# Patient Record
Sex: MALE | Race: WHITE | NOT HISPANIC OR LATINO | Employment: FULL TIME | URBAN - METROPOLITAN AREA
[De-identification: names, ages, dates, MRNs, and addresses within clinical notes are randomized per-mention and may not be internally consistent; named-entity substitution may affect disease eponyms.]

---

## 2017-05-15 ENCOUNTER — APPOINTMENT (OUTPATIENT)
Dept: GENERAL RADIOLOGY | Facility: HOSPITAL | Age: 52
End: 2017-05-15

## 2017-05-15 PROCEDURE — 71020 HC CHEST PA AND LATERAL: CPT | Performed by: FAMILY MEDICINE

## 2017-05-15 PROCEDURE — 71020 XR CHEST 2 VW: CPT | Performed by: FAMILY MEDICINE

## 2019-09-12 ENCOUNTER — HOSPITAL ENCOUNTER (EMERGENCY)
Facility: HOSPITAL | Age: 54
Discharge: HOME | End: 2019-09-12
Attending: EMERGENCY MEDICINE | Admitting: EMERGENCY MEDICINE

## 2019-09-12 ENCOUNTER — APPOINTMENT (OUTPATIENT)
Dept: CT IMAGING | Facility: HOSPITAL | Age: 54
End: 2019-09-12

## 2019-09-12 VITALS
OXYGEN SATURATION: 95 % | SYSTOLIC BLOOD PRESSURE: 114 MMHG | HEART RATE: 84 BPM | HEIGHT: 69 IN | RESPIRATION RATE: 14 BRPM | WEIGHT: 240 LBS | DIASTOLIC BLOOD PRESSURE: 66 MMHG | BODY MASS INDEX: 35.55 KG/M2 | TEMPERATURE: 97.9 F

## 2019-09-12 DIAGNOSIS — M54.16 LUMBAR RADICULOPATHY: ICD-10-CM

## 2019-09-12 DIAGNOSIS — M51.26 LUMBAR HERNIATED DISC: Primary | ICD-10-CM

## 2019-09-12 PROCEDURE — 99283 EMERGENCY DEPT VISIT LOW MDM: CPT

## 2019-09-12 PROCEDURE — 72131 CT LUMBAR SPINE W/O DYE: CPT

## 2019-09-12 PROCEDURE — 96372 THER/PROPH/DIAG INJ SC/IM: CPT

## 2019-09-12 PROCEDURE — 25010000002 HYDROMORPHONE 1 MG/ML SOLUTION: Performed by: EMERGENCY MEDICINE

## 2019-09-12 PROCEDURE — 25010000002 PROMETHAZINE PER 50 MG: Performed by: EMERGENCY MEDICINE

## 2019-09-12 RX ORDER — OXYCODONE AND ACETAMINOPHEN 7.5; 325 MG/1; MG/1
1-2 TABLET ORAL EVERY 6 HOURS PRN
Qty: 20 TABLET | Refills: 0 | Status: SHIPPED | OUTPATIENT
Start: 2019-09-12 | End: 2019-10-03

## 2019-09-12 RX ORDER — DICLOFENAC SODIUM AND MISOPROSTOL 75; 200 MG/1; UG/1
1 TABLET, DELAYED RELEASE ORAL 2 TIMES DAILY
COMMUNITY
End: 2019-09-20

## 2019-09-12 RX ORDER — PROMETHAZINE HYDROCHLORIDE 25 MG/ML
25 INJECTION, SOLUTION INTRAMUSCULAR; INTRAVENOUS ONCE
Status: COMPLETED | OUTPATIENT
Start: 2019-09-12 | End: 2019-09-12

## 2019-09-12 RX ORDER — METHYLPREDNISOLONE 4 MG/1
4 TABLET ORAL DAILY
COMMUNITY
End: 2019-09-18

## 2019-09-12 RX ORDER — CYCLOBENZAPRINE HCL 10 MG
10 TABLET ORAL 3 TIMES DAILY PRN
COMMUNITY
End: 2019-09-18

## 2019-09-12 RX ADMIN — PROMETHAZINE HYDROCHLORIDE 25 MG: 25 INJECTION INTRAMUSCULAR; INTRAVENOUS at 12:41

## 2019-09-12 RX ADMIN — HYDROMORPHONE HYDROCHLORIDE 1 MG: 1 INJECTION, SOLUTION INTRAMUSCULAR; INTRAVENOUS; SUBCUTANEOUS at 12:43

## 2019-09-12 NOTE — ED PROVIDER NOTES
EMERGENCY DEPARTMENT ENCOUNTER    Room Number:  06/06  Date of encounter:  9/12/2019  PCP: Provider, No Known  Historian: Pt      HPI:  Chief Complaint: Lower back pain  A complete HPI/ROS/PMH/PSH/SH/FH are unobtainable due to: n/a    Context: Renaldo Rodriguez is a 54 y.o. male who presents to the ED c/o lower back pain that has been ongoing since a work injury on 8/10/19. His pain significantly worsened 5 days ago after play golf. The pain is constant and radiates down his RLE. He also c/o tingling in his RLE. Pt has seen at a workers[facility] clinic 3 days ago and was prescribed a Medrol dose pack and Flexeril, which he has taken w/o relief. Pt denies fever, CP, SOA, weakness, and bowel or bladder incontinence       Duration:  Worse in past 5 days  Timing: constant  Intensity/Severity: severe  Progression: worsening   Aggravating Factors: movement   Alleviating Factors: none       PAST MEDICAL HISTORY  Active Ambulatory Problems     Diagnosis Date Noted   • No Active Ambulatory Problems     Resolved Ambulatory Problems     Diagnosis Date Noted   • No Resolved Ambulatory Problems     Past Medical History:   Diagnosis Date   • Injury of back    • Seasonal allergies          PAST SURGICAL HISTORY  History reviewed. No pertinent surgical history.      FAMILY HISTORY  History reviewed. No pertinent family history.      SOCIAL HISTORY  Social History     Socioeconomic History   • Marital status:      Spouse name: Not on file   • Number of children: Not on file   • Years of education: Not on file   • Highest education level: Not on file   Tobacco Use   • Smoking status: Never Smoker   Substance and Sexual Activity   • Alcohol use: Yes     Comment: social   • Drug use: No   • Sexual activity: Defer         ALLERGIES  Patient has no known allergies.        REVIEW OF SYSTEMS  Review of Systems   Constitutional: Negative for fever.   Respiratory: Negative for shortness of breath.    Cardiovascular: Negative for chest pain.    Musculoskeletal: Positive for back pain (RLE).   Neurological: Positive for numbness (tingling in RLE). Negative for weakness.        Denies incontinence       All systems reviewed and negative except for those discussed in HPI.       PHYSICAL EXAM    I have reviewed the triage vital signs and nursing notes.    ED Triage Vitals [09/12/19 1205]   Temp Heart Rate Resp BP SpO2   97.9 °F (36.6 °C) 84 18 (!) 135/113 97 %      Temp src Heart Rate Source Patient Position BP Location FiO2 (%)   Tympanic Monitor Sitting Right arm --       GENERAL: not distressed  HENT: nares patent  EYES: no scleral icterus  CV: regular rhythm, regular rate  RESPIRATORY: normal effort, CTAB  ABDOMEN: soft, non tender  MUSCULOSKELETAL: no deformity, right sided lumbar pain with palpation   NEURO: alert, moves all extremities, follows commands, positive SLR and positive cross SLR  SKIN: warm, dry        LAB RESULTS  No results found for this or any previous visit (from the past 24 hour(s)).    Ordered the above labs and independently reviewed the results.        RADIOLOGY  Ct Lumbar Spine Without Contrast    Result Date: 9/12/2019  CT LUMBAR SPINE WITHOUT CONTRAST  HISTORY: Back pain, right radiculopathy.  COMPARISON: None.  FINDINGS: The alignment of the lumbar spine is within normal limits. There is mild-to-moderate loss of disc height at L4-L5.  L1-L2: There is a mild broad-based disc bulge with no evidence of herniation.  L2-L3: There is a mild central disc osteophyte complex resulting in mild flattening of the ventral surface of the thecal sac. There is mild neural foraminal compromise bilaterally secondary to extension of disc osteophyte complex into the neural foramen.  L3-L4: There is a mild central disc osteophyte complex resulting in mild flattening of the ventral surface of the thecal sac. There is mild neural foraminal compromise bilaterally secondary to extension of a small disc osteophyte complex into the neural foramen.   L4-L5: Mild facet degenerative disease is present bilaterally, slightly more prominent on the right. There is severe neural foraminal compromise on the right secondary to a far lateral disc protrusion or broad-based herniation. Mild-to-moderate neural foraminal compromise is present on the left secondary to loss of disc height and extension of a smaller disc osteophyte complex into the neural foramen.  L5-S1: Mild facet degenerative disease is present bilaterally. There is a mild broad-based disc osteophyte complex with no evidence of herniation.      Multilevel degenerative disease as described in detail above, most prominent of which however is at L4-L5 where there is a far lateral disc protrusion or broad-based herniation extending into the neural foramen and resulting in severe neural foraminal compromise. See above. Further evaluation could be performed with a MRI examination of the lumbar spine.  The above information was called to and discussed with Dr. Tejada.    Radiation dose reduction techniques were utilized, including automated exposure control and exposure modulation based on body size.         I ordered the above noted radiological studies. Reviewed by me and discussed with radiologist.  See dictation for official radiology interpretation.      PROCEDURES    Procedures      MEDICATIONS GIVEN IN ER    Medications   HYDROmorphone (DILAUDID) injection 1 mg (1 mg Intramuscular Given 9/12/19 1243)   promethazine (PHENERGAN) injection 25 mg (25 mg Intramuscular Given 9/12/19 1241)         PROGRESS, DATA ANALYSIS, CONSULTS, AND MEDICAL DECISION MAKING  12:28 PM  Phenergan and Dilaudid ordered for pain.     1:38 PM  CT lumbar spine ordered.     2:21 PM  Rechecked pt who is resting in NAD. His pain has improved. Informed pt of CT results. Consult placed to neurosurgery.     All radiology studies have been reviewed by me and discussed with radiologist dictating the report.  Discussion below represents my  analysis of pertinent findings related to patient's condition, differential diagnosis, treatment plan and final disposition.      ED Course as of Sep 12 1601   Thu Sep 12, 2019   1227 54-year-old male with reported work-related injury about 1 month ago.  He has been followed with a Worker's Comp. agency.  He exacerbated his pain while playing golf on Saturday and now has fairly severe pain that radiates down the right leg.  Patient was started on Monday with Medrol Dosepak, diclofenac and Flexeril.  These have not helped the pain and he continues complain of severe pain in the right lower back rating down the right lower leg  On examination he has a positive cross straight leg raise and straight leg raise.  Strength and sensation is normal and there is no saddle paresthesias.  We will give IM Dilaudid and Phenergan for pain.  Will get CT scan of the lumbar spine as I have a high suspicion for lumbar sciatica.  [DB]   1419 I discussed lumbar spine CT results with Dr. Dye.  There is lumbar DJD most prominently at the L4/5 disc level with some disc protrusion causing severe neuro-foraminal narrowing at the L4 nerve root.  [DB]   1420 I will discuss results of the CT scan of the bedside with patient and wife.  Will consult neurosurgery for likely outpatient treatment.  [DB]   1600 I discussed evaluation with Araceli Robertson from neurosurgery service.  She felt the patient could follow-up as an outpatient we will do our best to get patient seen with the next couple weeks.  [DB]      ED Course User Index  [DB] Noe Tejada MD       AS OF 4:01 PM VITALS:    BP - 133/79  HR - 84  TEMP - 97.9 °F (36.6 °C) (Tympanic)  02 SATS - 98%        DIAGNOSIS  Final diagnoses:   Lumbar herniated disc   Lumbar radiculopathy         DISPOSITION  DISCHARGE    Patient discharged in stable condition.    Reviewed implications of results, diagnosis, meds, responsibility to follow up, warning signs and symptoms of possible worsening, potential  complications and reasons to return to ER.    Patient/Family voiced understanding of above instructions.    Discussed plan for discharge, as there is no emergent indication for admission. Patient referred to primary care provider for BP management due to today's BP. Pt/family is agreeable and understands need for follow up and repeat testing.  Pt is aware that discharge does not mean that nothing is wrong but it indicates no emergency is present that requires admission and they must continue care with follow-up as given below or physician of their choice.     FOLLOW-UP  Ghanshyam Canas MD  [address] [address]  [phone]      Call for Appointment         Medication List      New Prescriptions    oxyCODONE-acetaminophen 7.5-325 MG per tablet  Commonly known as:  PERCOCET  Take 1-2 tablets by mouth Every 6 (Six) Hours As Needed (Pain).              Documentation assistance provided by martin Barrera for Dr. Tejada.  Information recorded by the scribe was done at my direction and has been verified and validated by me.       Mey Barrera  09/12/19 0516       Noe Tejada MD  09/12/19 2069

## 2019-09-18 ENCOUNTER — OFFICE VISIT (OUTPATIENT)
Dept: NEUROSURGERY | Facility: CLINIC | Age: 54
End: 2019-09-18

## 2019-09-18 VITALS
SYSTOLIC BLOOD PRESSURE: 134 MMHG | DIASTOLIC BLOOD PRESSURE: 81 MMHG | BODY MASS INDEX: 34.66 KG/M2 | WEIGHT: 234 LBS | HEIGHT: 69 IN | HEART RATE: 80 BPM

## 2019-09-18 DIAGNOSIS — M54.16 LUMBAR RADICULOPATHY: Primary | ICD-10-CM

## 2019-09-18 PROCEDURE — 99244 OFF/OP CNSLTJ NEW/EST MOD 40: CPT | Performed by: NEUROLOGICAL SURGERY

## 2019-09-18 RX ORDER — DEXAMETHASONE 4 MG/1
8 TABLET ORAL TAKE AS DIRECTED
Qty: 2 TABLET | Refills: 0 | Status: SHIPPED | OUTPATIENT
Start: 2019-09-18 | End: 2019-09-24 | Stop reason: HOSPADM

## 2019-09-18 NOTE — H&P (VIEW-ONLY)
Subjective   Patient ID: Renaldo Rodriguez is a 54 y.o. male is being seen for consultation today at the request of Noe Tejada MD for back pain that radiates into his right leg with numbness and tingling. He just finished MDP, Flexeril and Arthrotec. He completed a couple sessions of PT and it made his pain worse. He is doing HEP. He had a muscle injury 8/2018 and was not seen or treated. He had another injury lifting boxes at work 8/10/2019. His employer did not have him treated anywhere after that incident. He was then golfing when severe pain started again and he was seen in the ER.    History of Present Illness     This patient began having pain in his back with radiation into his right hip about a year ago.  At that time he had a lifting injury at work in August 2018.  He said he did not reported at that time but ultimately the pain got quite a bit better.  Subsequent to that he did generally okay until August of this year when he had the same injury at work.  He began having pain in his back with radiation into his right hip at that time and then was playing golf about 10 days ago when he had the acute onset of severe pain in his right lower leg.  Currently his back is achy but does not cause severe pain.  His left leg is okay.  He has no difficulty with bowel or bladder control or other associated symptoms.  He has been treated with steroids as well as a short course of physical therapy which did not help.    The following portions of the patient's history were reviewed and updated as appropriate: allergies, current medications, past family history, past medical history, past social history, past surgical history and problem list.    Review of Systems   Constitutional: Positive for activity change.   Respiratory: Negative for chest tightness and shortness of breath.    Cardiovascular: Negative for chest pain.   Musculoskeletal: Positive for back pain, gait problem and myalgias.   Neurological: Positive for  weakness (Right leg) and numbness.        Positive for tingling   All other systems reviewed and are negative.      Objective   Physical Exam   Constitutional: He is oriented to person, place, and time. He appears well-developed and well-nourished.   HENT:   Head: Normocephalic and atraumatic.   Eyes: Conjunctivae and EOM are normal. Pupils are equal, round, and reactive to light.   Fundoscopic exam:       The right eye shows no papilledema. The right eye shows venous pulsations.        The left eye shows no papilledema. The left eye shows venous pulsations.   Neck: Carotid bruit is not present.   Neurological: He is oriented to person, place, and time. He has a normal Finger-Nose-Finger Test and a normal Heel to Shin Test. Gait normal.   Reflex Scores:       Tricep reflexes are 2+ on the right side and 2+ on the left side.       Bicep reflexes are 2+ on the right side and 2+ on the left side.       Brachioradialis reflexes are 2+ on the right side and 2+ on the left side.       Patellar reflexes are 2+ on the right side and 2+ on the left side.       Achilles reflexes are 2+ on the right side and 2+ on the left side.  Psychiatric: His speech is normal.     Neurologic Exam     Mental Status   Oriented to person, place, and time.   Registration of memory: Good recent and remote memory.   Attention: normal. Concentration: normal.   Speech: speech is normal   Level of consciousness: alert  Knowledge: consistent with education.     Cranial Nerves     CN II   Visual fields full to confrontation.   Visual acuity: normal    CN III, IV, VI   Pupils are equal, round, and reactive to light.  Extraocular motions are normal.     CN V   Facial sensation intact.   Right corneal reflex: normal  Left corneal reflex: normal    CN VII   Facial expression full, symmetric.   Right facial weakness: none  Left facial weakness: none    CN VIII   Hearing: intact    CN IX, X   Palate: symmetric    CN XI   Right sternocleidomastoid strength:  normal  Left sternocleidomastoid strength: normal    CN XII   Tongue: not atrophic  Tongue deviation: none    Motor Exam   Muscle bulk: normal  Right arm tone: normal  Left arm tone: normal  Right leg tone: normal  Left leg tone: normal    Strength   Strength 5/5 except as noted.     Sensory Exam   Light touch normal.     Gait, Coordination, and Reflexes     Gait  Gait: normal    Coordination   Finger to nose coordination: normal  Heel to shin coordination: normal    Reflexes   Right brachioradialis: 2+  Left brachioradialis: 2+  Right biceps: 2+  Left biceps: 2+  Right triceps: 2+  Left triceps: 2+  Right patellar: 2+  Left patellar: 2+  Right achilles: 2+  Left achilles: 2+  Right : 2+  Left : 2+      Assessment/Plan   Independent Review of Radiographic Studies:      I reviewed his CT done on 12 September of this year.  This does raise the question of a far lateral disc herniation at L4-5.  This appears to be worse on the right than the left.  There is some disc bulging at the other levels.  The clarity on the CT is not good.    Medical Decision Making:      I told the patient and his wife that I really think we need to get better images at this point.  To that end I suggested a lumbar myelogram.  I told the patient what a myelogram involves.  I explained that there is a 50% chance of developing a bad headache and nausea as a result of the test.  I explained that there is also a very small chance of infection, seizures, and bleeding.  I explained how we would treat a post myelogram headache including bedrest, caffeinated fluids, steroids, and blood patch.  The patient does ask to proceed.    Renaldo was seen today for back pain and leg pain.    Diagnoses and all orders for this visit:    Lumbar radiculopathy  -     Obtain Informed Consent; Standing  -     IR Myelogram Lumbar Spine; Future  -     CT Lumbar Spine With Intrathecal Contrast; Future  -     XR Spine Lumbar Complete With Flex & Ext; Future  -      No Lab Testing Needed; Standing  -     dexamethasone (DECADRON) 4 MG tablet; Take 2 tablets by mouth Take As Directed. Take both tablets by mouth 2 hours before myelogram      Return for After radiology test.

## 2019-09-24 ENCOUNTER — HOSPITAL ENCOUNTER (OUTPATIENT)
Dept: GENERAL RADIOLOGY | Facility: HOSPITAL | Age: 54
Discharge: HOME | End: 2019-09-24

## 2019-09-24 ENCOUNTER — HOSPITAL ENCOUNTER (OUTPATIENT)
Dept: CT IMAGING | Facility: HOSPITAL | Age: 54
Discharge: HOME | End: 2019-09-24
Admitting: NEUROLOGICAL SURGERY

## 2019-09-24 VITALS
WEIGHT: 229 LBS | SYSTOLIC BLOOD PRESSURE: 122 MMHG | OXYGEN SATURATION: 98 % | HEART RATE: 62 BPM | RESPIRATION RATE: 16 BRPM | BODY MASS INDEX: 33.92 KG/M2 | HEIGHT: 69 IN | DIASTOLIC BLOOD PRESSURE: 78 MMHG

## 2019-09-24 DIAGNOSIS — M54.16 LUMBAR RADICULOPATHY: ICD-10-CM

## 2019-09-24 PROCEDURE — 25010000003 LIDOCAINE 1 % SOLUTION: Performed by: NEUROLOGICAL SURGERY

## 2019-09-24 PROCEDURE — 72114 X-RAY EXAM L-S SPINE BENDING: CPT

## 2019-09-24 PROCEDURE — 62304 MYELOGRAPHY LUMBAR INJECTION: CPT

## 2019-09-24 PROCEDURE — 72132 CT LUMBAR SPINE W/DYE: CPT

## 2019-09-24 PROCEDURE — 0 IOPAMIDOL 41 % SOLUTION: Performed by: NEUROLOGICAL SURGERY

## 2019-09-24 PROCEDURE — 72240 MYELOGRAPHY NECK SPINE: CPT

## 2019-09-24 RX ORDER — ACETAMINOPHEN 325 MG/1
650 TABLET ORAL EVERY 4 HOURS PRN
Status: DISCONTINUED | OUTPATIENT
Start: 2019-09-24 | End: 2019-09-25 | Stop reason: HOSPADM

## 2019-09-24 RX ORDER — ACETAMINOPHEN 500 MG
1000 TABLET ORAL EVERY 6 HOURS PRN
COMMUNITY

## 2019-09-24 RX ORDER — HYDROCODONE BITARTRATE AND ACETAMINOPHEN 5; 325 MG/1; MG/1
1 TABLET ORAL EVERY 4 HOURS PRN
Status: DISCONTINUED | OUTPATIENT
Start: 2019-09-24 | End: 2019-09-25 | Stop reason: HOSPADM

## 2019-09-24 RX ORDER — LIDOCAINE HYDROCHLORIDE 10 MG/ML
10 INJECTION, SOLUTION INFILTRATION; PERINEURAL ONCE
Status: COMPLETED | OUTPATIENT
Start: 2019-09-24 | End: 2019-09-24

## 2019-09-24 RX ADMIN — IOPAMIDOL 20 ML: 408 INJECTION, SOLUTION INTRATHECAL at 07:26

## 2019-09-24 RX ADMIN — HYDROCODONE BITARTATE AND ACETAMINOPHEN 1 TABLET: 5; 325 TABLET ORAL at 08:02

## 2019-09-24 RX ADMIN — LIDOCAINE HYDROCHLORIDE 7 ML: 10 INJECTION, SOLUTION INFILTRATION; PERINEURAL at 07:25

## 2019-09-24 NOTE — NURSING NOTE
Patient dressed, complained of slight dizziness.  Instructed to be less active for the next 2-3 days. He is consuming lots of hydrating beverages and caffeine while he is here. To wheelchair now, taken to exit with staff member.  Wife is driving him home now.

## 2019-09-24 NOTE — DISCHARGE INSTRUCTIONS
EDUCATION /DISCHARGE INSTRUCTIONS:    A myelogram is a special radiology procedure of the spinal cord, spinal nerves and other related structures.  You will be awake during the examination.  An area of your lower back will be cleansed with an antiseptic solution.  The physician will inject a numbing medication in your lower back.  While your back is numb, a needle will be placed in the lower back area.  A small amount of spinal fluid may be withdrawn and sent to the lab if ordered by your physician. While the needle is in the back, an injection of a contrast material (xray dye) will be given through the needle.  The contrast material will allow the physician to see the spinal cord and spinal nerves.  Once injected, the needle will be removed and a band aid will be placed over the injection site.  The table will be tilted during the process to allow the contrast material to flow to particular areas in the spine.  Following the injection and xrays, you will be taken to the CT scan where more pictures will be taken. After the procedure is finished, the contrast material will be absorbed by your body and eliminated through your kidneys.  The radiologist will study and interpret your myelogram and send the results to your physician.  Procedure risks of a myelogram include, but are not limited to:  *  Bleeding   *  seizure  *  Infection   *  Headache, possibly severe requiring  *  Contrast reaction      a blood patch  *  Nerve or cord injury  *  Paralysis and death  Benefits of the procedure:  *  Best examination for delineating pathology related to spinal cord compression from a    disc and/or nerve root compression  Alternatives to the procedure:  MRI - a non invasive procedure requiring intravenous contrast injection.  Cannot be done on patients with certain pacemakers or metal in the body.  MRI risks include possible reaction to the contrast material, movement of metal located in the body.  Benefit to MRI:   Non-invasive and usually painless procedure.  THIS EDUCATION INFORMATION WAS REVIEWED PRIOR TO PROCEDURE AND CONSENT. Patient initials________________Time____0702______________    24 hour rest period ends ____1100________________.  Important information following your myelogram:  * ACTIVITY:   *  Lie down with your head elevated no more than 2 pillows high today & tonight  *  Sit up to eat your meals and use the restroom, otherwise, lie down.  *  Remain less active for two to three days.  *  Do not drive for 48 hours following a myelogram  *  You may remove the bandage and shower in the morning  *  Increase your fluids for the next 24 hours.  Caffeinated drinks are encouraged.  Resume taking blood thinner or aspirin on _9/25/19 after 1100__    CALL YOUR PHYSICIAN FOR THE FOLLOWING:  * Pain at the injection site  * Reddness, swelling, bruising or drainage at the injection site.  * A fever by mouth of 101.0 or any new symptoms  Headaches are a common side effect after a myelogram.  If you get a headache, you should stay flat in bed and drink plenty of fluids. If the headache persist and does not go away with rest/medication, CALL Dr. Canas at [phone]

## 2019-09-25 ENCOUNTER — OFFICE VISIT (OUTPATIENT)
Dept: NEUROSURGERY | Facility: CLINIC | Age: 54
End: 2019-09-25

## 2019-09-25 ENCOUNTER — TELEPHONE (OUTPATIENT)
Dept: INTERVENTIONAL RADIOLOGY/VASCULAR | Facility: HOSPITAL | Age: 54
End: 2019-09-25

## 2019-09-25 ENCOUNTER — PREP FOR SURGERY (OUTPATIENT)
Dept: OTHER | Facility: HOSPITAL | Age: 54
End: 2019-09-25

## 2019-09-25 DIAGNOSIS — M51.16 NEURITIS OR RADICULITIS DUE TO RUPTURE OF LUMBAR INTERVERTEBRAL DISC: Primary | ICD-10-CM

## 2019-09-25 PROCEDURE — 99213 OFFICE O/P EST LOW 20 MIN: CPT | Performed by: NEUROLOGICAL SURGERY

## 2019-09-25 RX ORDER — CEFAZOLIN SODIUM 2 G/100ML
2 INJECTION, SOLUTION INTRAVENOUS ONCE
Status: CANCELLED | OUTPATIENT
Start: 2019-10-04 | End: 2019-09-25

## 2019-09-25 NOTE — PROGRESS NOTES
Subjective   Patient ID: Renaldo Rodriguez is a 54 y.o. male is here today for follow-up with a new Lumbar Myelogram that was ordered at his last office visit 9/18/2019 for for back pain that radiated into his right leg with numbness and tingling.  Today his symptoms are unchanged from his previous visit.    History of Present Illness     This patient returns today.  He is still having severe pain in his right leg with numbness and tingling in his right leg.  He has some dull aching in his back but no severe pain.    The following portions of the patient's history were reviewed and updated as appropriate: allergies, current medications, past family history, past medical history, past social history, past surgical history and problem list.    Review of Systems   Constitutional: Positive for activity change.   Respiratory: Negative for chest tightness and shortness of breath.    Cardiovascular: Negative for chest pain.   Musculoskeletal: Positive for back pain and myalgias.        Right leg pain   Neurological: Positive for weakness ( Right leg) and numbness.        Positive for tingling   All other systems reviewed and are negative.      Objective   Physical Exam   Constitutional: He is oriented to person, place, and time. He appears well-developed and well-nourished.   Neurological: He is oriented to person, place, and time.     Neurologic Exam     Mental Status   Oriented to person, place, and time.       Assessment/Plan   Independent Review of Radiographic Studies:      I reviewed his plain films, myelogram, and CT scan myself.  The plain films show no evidence of abnormal movement on flexion extension and just mild degenerative change.  On the myelogram itself there is a definite nerve root cut out on the right side at L4-5.  This is quite severe.  The left side fills out pretty well.  There is a little narrowing at L2-3 but not severe.  The standing films do not change very much.  On the post myelographic CT scan  the lower thoracic spine down to the L2 vertebral body looks okay.  L2-3 shows a little lateral recess stenosis and a little central narrowing with a little foraminal disc bulging on the right side.  L3-4 also shows a little central stenosis.  L4-5 shows a large disc herniation to the right side extending from the L4 pedicle down to the L5 pedicle consistent with the myelogram.  L5-S1 looks okay.    Medical Decision Making:      I told the patient and his wife about the imaging.  I told him that from my point of view I would recommend a right L4-5 laminectomy and discectomy with minimally invasive techniques.  I told the patient about the risks, complications and expected outcome of the lumbar surgery.  I explained that there was an 80% chance of getting rid of the pain in the leg.  I explained that there would still be back pain after the surgery.  Initially this will be quite severe but will improve over time.  There is a 2 or 3% chance of infection, bleeding, CSF leak, damage to the nerve as a result of surgery, paralysis, as well as anesthetic risk.  There is a 5% chance of late instability which could require a fusion later on and a 10% chance of recurrent disc herniation.  We discussed the postoperative hospital and home course.  He would like to think about whether he would like to proceed with this.  There is no way this man can work with his current problem.  I did remind him that the disc is large and there is some risk of permanent nerve damage if he lets this continue to go on.    If he calls he will need to be scheduled for a: Right lumbar 4 to lumbar 5 laminectomy and discectomy with metrx    After I finished this note the patient elected to proceed with surgery.    Renaldo was seen today for back pain and leg pain.    Diagnoses and all orders for this visit:    Neuritis or radiculitis due to rupture of lumbar intervertebral disc      Return for 2-3 week post op.

## 2019-09-26 ENCOUNTER — TELEPHONE (OUTPATIENT)
Dept: NEUROSURGERY | Facility: CLINIC | Age: 54
End: 2019-09-26

## 2019-09-26 NOTE — TELEPHONE ENCOUNTER
Saba emailed the patient his letter for work. He called this morning as he is not able to open that email. I told him we can either fax it or mail it to him. He will get a fax number and call us back.

## 2019-09-30 ENCOUNTER — TELEPHONE (OUTPATIENT)
Dept: NEUROSURGERY | Facility: CLINIC | Age: 54
End: 2019-09-30

## 2019-10-03 ENCOUNTER — APPOINTMENT (OUTPATIENT)
Dept: PREADMISSION TESTING | Facility: HOSPITAL | Age: 54
End: 2019-10-03

## 2019-10-03 VITALS
WEIGHT: 233 LBS | OXYGEN SATURATION: 95 % | TEMPERATURE: 97.8 F | DIASTOLIC BLOOD PRESSURE: 84 MMHG | HEIGHT: 69 IN | SYSTOLIC BLOOD PRESSURE: 142 MMHG | BODY MASS INDEX: 34.51 KG/M2 | RESPIRATION RATE: 16 BRPM | HEART RATE: 63 BPM

## 2019-10-03 LAB
ANION GAP SERPL CALCULATED.3IONS-SCNC: 12 MMOL/L (ref 5–15)
BUN BLD-MCNC: 16 MG/DL (ref 6–20)
BUN/CREAT SERPL: 16.8 (ref 7–25)
CALCIUM SPEC-SCNC: 9 MG/DL (ref 8.6–10.5)
CHLORIDE SERPL-SCNC: 104 MMOL/L (ref 98–107)
CO2 SERPL-SCNC: 24 MMOL/L (ref 22–29)
CREAT BLD-MCNC: 0.95 MG/DL (ref 0.76–1.27)
DEPRECATED RDW RBC AUTO: 39.9 FL (ref 37–54)
ERYTHROCYTE [DISTWIDTH] IN BLOOD BY AUTOMATED COUNT: 12.9 % (ref 12.3–15.4)
GFR SERPL CREATININE-BSD FRML MDRD: 82 ML/MIN/1.73
GLUCOSE BLD-MCNC: 102 MG/DL (ref 65–99)
HCT VFR BLD AUTO: 46.7 % (ref 37.5–51)
HGB BLD-MCNC: 15.3 G/DL (ref 13–17.7)
MCH RBC QN AUTO: 27.9 PG (ref 26.6–33)
MCHC RBC AUTO-ENTMCNC: 32.8 G/DL (ref 31.5–35.7)
MCV RBC AUTO: 85.2 FL (ref 79–97)
PLATELET # BLD AUTO: 226 10*3/MM3 (ref 140–450)
PMV BLD AUTO: 10.3 FL (ref 6–12)
POTASSIUM BLD-SCNC: 4.3 MMOL/L (ref 3.5–5.2)
RBC # BLD AUTO: 5.48 10*6/MM3 (ref 4.14–5.8)
SODIUM BLD-SCNC: 140 MMOL/L (ref 136–145)
WBC NRBC COR # BLD: 6.11 10*3/MM3 (ref 3.4–10.8)

## 2019-10-03 PROCEDURE — 85027 COMPLETE CBC AUTOMATED: CPT | Performed by: NEUROLOGICAL SURGERY

## 2019-10-03 PROCEDURE — 80048 BASIC METABOLIC PNL TOTAL CA: CPT | Performed by: NEUROLOGICAL SURGERY

## 2019-10-03 PROCEDURE — 36415 COLL VENOUS BLD VENIPUNCTURE: CPT

## 2019-10-03 NOTE — DISCHARGE INSTRUCTIONS
Take the following medications the morning of surgery with a small sip of water:    NONE    ARRIVE AT 7:30 AS INSTRUCTED BY OFFICE    General Instructions:  • Do not eat solid food after midnight the night before surgery.  • You may drink clear liquids day of surgery but must stop at least one hour before your hospital arrival time.  • It is beneficial for you to have a clear drink that contains carbohydrates the day of surgery.  We suggest a 12 to 20 ounce bottle of Gatorade or Powerade for non-diabetic patients or a 12 to 20 ounce bottle of G2 or Powerade Zero for diabetic patients. (Pediatric patients, are not advised to drink a 12 to 20 ounce carbohydrate drink)    Clear liquids are liquids you can see through.  Nothing red in color.     Plain water                               Sports drinks  Sodas                                   Gelatin (Jell-O)  Fruit juices without pulp such as white grape juice and apple juice  Popsicles that contain no fruit or yogurt  Tea or coffee (no cream or milk added)  Gatorade / Powerade  G2 / Powerade Zero    • Infants may have breast milk up to four hours before surgery.  • Infants drinking formula may drink formula up to six hours before surgery.   • Patients who avoid smoking, chewing tobacco and alcohol for 4 weeks prior to surgery have a reduced risk of post-operative complications.  Quit smoking as many days before surgery as you can.  • Do not smoke, use chewing tobacco or drink alcohol the day of surgery.   • If applicable bring your C-PAP/ BI-PAP machine.  • Bring any papers given to you in the doctor’s office.  • Wear clean comfortable clothes.  • Do not wear contact lenses, false eyelashes or make-up.  Bring a case for your glasses.   • Bring crutches or walker if applicable.  • Remove all piercings.  Leave jewelry and any other valuables at home.  • Hair extensions with metal clips must be removed prior to surgery.  • The Pre-Admission Testing nurse will instruct you  to bring medications if unable to obtain an accurate list in Pre-Admission Testing.        If you were given a blood bank ID arm band remember to bring it with you the day of surgery.    Preventing a Surgical Site Infection:  • For 2 to 3 days before surgery, avoid shaving with a razor because the razor can irritate skin and make it easier to develop an infection.    • Any areas of open skin can increase the risk of a post-operative wound infection by allowing bacteria to enter and travel throughout the body.  Notify your surgeon if you have any skin wounds / rashes even if it is not near the expected surgical site.  The area will need assessed to determine if surgery should be delayed until it is healed.  • The night prior to surgery sleep in a clean bed with clean clothing.  Do not allow pets to sleep with you.  • Shower on the morning of surgery using a fresh bar of anti-bacterial soap (such as Dial) and clean washcloth.  Dry with a clean towel and dress in clean clothing.  • Ask your surgeon if you will be receiving antibiotics prior to surgery.  • Make sure you, your family, and all healthcare providers clean their hands with soap and water or an alcohol based hand  before caring for you or your wound.    Day of surgery:  Your arrival time is approximately two hours before your scheduled surgery time.  Upon arrival, a Pre-op nurse and Anesthesiologist will review your health history, obtain vital signs, and answer questions you may have.  The only belongings needed at this time will be a list of your home medications and if applicable your C-PAP/BI-PAP machine.  If you are staying overnight your family can leave the rest of your belongings in the car and bring them to your room later.  A Pre-op nurse will start an IV and you may receive medication in preparation for surgery, including something to help you relax.  Your family will be able to see you in the Pre-op area.  Two visitors at a time will be  allowed in the Pre-op room.  While you are in surgery your family should notify the waiting room  if they leave the waiting room area and provide a contact phone number.    Please be aware that surgery does come with discomfort.  We want to make every effort to control your discomfort so please discuss any uncontrolled symptoms with your nurse.   Your doctor will most likely have prescribed pain medications.      If you are going home after surgery you will receive individualized written care instructions before being discharged.  A responsible adult must drive you to and from the hospital on the day of your surgery and stay with you for 24 hours.    If you are staying overnight following surgery, you will be transported to your hospital room following the recovery period.  [facility] has all private rooms.    You have received a list of surgical assistants for your reference.  If you have any questions please call Pre-Admission Testing at [phone].  Deductibles and co-payments are collected on the day of service. Please be prepared to pay the required co-pay, deductible or deposit on the day of service as defined by your plan.

## 2019-10-04 ENCOUNTER — APPOINTMENT (OUTPATIENT)
Dept: GENERAL RADIOLOGY | Facility: HOSPITAL | Age: 54
End: 2019-10-04

## 2019-10-04 ENCOUNTER — HOSPITAL ENCOUNTER (OUTPATIENT)
Facility: HOSPITAL | Age: 54
Setting detail: HOSPITAL OUTPATIENT SURGERY
Discharge: HOME | End: 2019-10-04
Attending: NEUROLOGICAL SURGERY | Admitting: NEUROLOGICAL SURGERY

## 2019-10-04 ENCOUNTER — ANESTHESIA (OUTPATIENT)
Dept: PERIOP | Facility: HOSPITAL | Age: 54
End: 2019-10-04

## 2019-10-04 ENCOUNTER — ANESTHESIA EVENT (OUTPATIENT)
Dept: PERIOP | Facility: HOSPITAL | Age: 54
End: 2019-10-04

## 2019-10-04 ENCOUNTER — TELEPHONE (OUTPATIENT)
Dept: NEUROSURGERY | Facility: CLINIC | Age: 54
End: 2019-10-04

## 2019-10-04 ENCOUNTER — APPOINTMENT (OUTPATIENT)
Dept: PREADMISSION TESTING | Facility: HOSPITAL | Age: 54
End: 2019-10-04

## 2019-10-04 VITALS
WEIGHT: 232.56 LBS | HEART RATE: 65 BPM | RESPIRATION RATE: 16 BRPM | DIASTOLIC BLOOD PRESSURE: 97 MMHG | HEIGHT: 69 IN | OXYGEN SATURATION: 98 % | BODY MASS INDEX: 34.45 KG/M2 | TEMPERATURE: 98.2 F | SYSTOLIC BLOOD PRESSURE: 137 MMHG

## 2019-10-04 DIAGNOSIS — M51.16 NEURITIS OR RADICULITIS DUE TO RUPTURE OF LUMBAR INTERVERTEBRAL DISC: ICD-10-CM

## 2019-10-04 PROCEDURE — 63030 LAMOT DCMPRN NRV RT 1 LMBR: CPT | Performed by: NEUROLOGICAL SURGERY

## 2019-10-04 PROCEDURE — 25010000003 CEFAZOLIN IN DEXTROSE 2-4 GM/100ML-% SOLUTION: Performed by: NEUROLOGICAL SURGERY

## 2019-10-04 PROCEDURE — 76000 FLUOROSCOPY <1 HR PHYS/QHP: CPT

## 2019-10-04 PROCEDURE — 25010000002 SUCCINYLCHOLINE PER 20 MG: Performed by: NURSE ANESTHETIST, CERTIFIED REGISTERED

## 2019-10-04 PROCEDURE — 25010000002 ONDANSETRON PER 1 MG: Performed by: NURSE ANESTHETIST, CERTIFIED REGISTERED

## 2019-10-04 PROCEDURE — 25010000002 MIDAZOLAM PER 1 MG: Performed by: ANESTHESIOLOGY

## 2019-10-04 PROCEDURE — 25010000002 DEXAMETHASONE PER 1 MG: Performed by: NURSE ANESTHETIST, CERTIFIED REGISTERED

## 2019-10-04 PROCEDURE — 63030 LAMOT DCMPRN NRV RT 1 LMBR: CPT | Performed by: SPECIALIST/TECHNOLOGIST, OTHER

## 2019-10-04 PROCEDURE — 25010000002 FENTANYL CITRATE (PF) 100 MCG/2ML SOLUTION: Performed by: ANESTHESIOLOGY

## 2019-10-04 PROCEDURE — 25010000002 FENTANYL CITRATE (PF) 100 MCG/2ML SOLUTION: Performed by: NURSE ANESTHETIST, CERTIFIED REGISTERED

## 2019-10-04 PROCEDURE — 25010000002 PROPOFOL 10 MG/ML EMULSION: Performed by: NURSE ANESTHETIST, CERTIFIED REGISTERED

## 2019-10-04 PROCEDURE — 25010000002 HYDROMORPHONE PER 4 MG: Performed by: NURSE ANESTHETIST, CERTIFIED REGISTERED

## 2019-10-04 PROCEDURE — 25010000002 KETOROLAC TROMETHAMINE PER 15 MG: Performed by: NURSE ANESTHETIST, CERTIFIED REGISTERED

## 2019-10-04 PROCEDURE — 72100 X-RAY EXAM L-S SPINE 2/3 VWS: CPT

## 2019-10-04 DEVICE — SSC BONE WAX
Type: IMPLANTABLE DEVICE | Site: SPINE LUMBAR | Status: FUNCTIONAL
Brand: SSC BONE WAX

## 2019-10-04 RX ORDER — NALOXONE HCL 0.4 MG/ML
0.2 VIAL (ML) INJECTION AS NEEDED
Status: DISCONTINUED | OUTPATIENT
Start: 2019-10-04 | End: 2019-10-04 | Stop reason: HOSPADM

## 2019-10-04 RX ORDER — PROMETHAZINE HYDROCHLORIDE 25 MG/ML
12.5 INJECTION, SOLUTION INTRAMUSCULAR; INTRAVENOUS ONCE AS NEEDED
Status: DISCONTINUED | OUTPATIENT
Start: 2019-10-04 | End: 2019-10-04 | Stop reason: HOSPADM

## 2019-10-04 RX ORDER — ROCURONIUM BROMIDE 10 MG/ML
INJECTION, SOLUTION INTRAVENOUS AS NEEDED
Status: DISCONTINUED | OUTPATIENT
Start: 2019-10-04 | End: 2019-10-04 | Stop reason: SURG

## 2019-10-04 RX ORDER — HYDROMORPHONE HYDROCHLORIDE 1 MG/ML
0.5 INJECTION, SOLUTION INTRAMUSCULAR; INTRAVENOUS; SUBCUTANEOUS
Status: DISCONTINUED | OUTPATIENT
Start: 2019-10-04 | End: 2019-10-04 | Stop reason: HOSPADM

## 2019-10-04 RX ORDER — PROMETHAZINE HYDROCHLORIDE 25 MG/1
25 SUPPOSITORY RECTAL ONCE AS NEEDED
Status: DISCONTINUED | OUTPATIENT
Start: 2019-10-04 | End: 2019-10-04 | Stop reason: HOSPADM

## 2019-10-04 RX ORDER — PROMETHAZINE HYDROCHLORIDE 25 MG/ML
6.25 INJECTION, SOLUTION INTRAMUSCULAR; INTRAVENOUS
Status: DISCONTINUED | OUTPATIENT
Start: 2019-10-04 | End: 2019-10-04 | Stop reason: HOSPADM

## 2019-10-04 RX ORDER — DIPHENHYDRAMINE HYDROCHLORIDE 50 MG/ML
12.5 INJECTION INTRAMUSCULAR; INTRAVENOUS
Status: DISCONTINUED | OUTPATIENT
Start: 2019-10-04 | End: 2019-10-04 | Stop reason: HOSPADM

## 2019-10-04 RX ORDER — HYDROCODONE BITARTRATE AND ACETAMINOPHEN 7.5; 325 MG/1; MG/1
1 TABLET ORAL ONCE AS NEEDED
Status: DISCONTINUED | OUTPATIENT
Start: 2019-10-04 | End: 2019-10-04 | Stop reason: HOSPADM

## 2019-10-04 RX ORDER — SODIUM CHLORIDE, SODIUM LACTATE, POTASSIUM CHLORIDE, CALCIUM CHLORIDE 600; 310; 30; 20 MG/100ML; MG/100ML; MG/100ML; MG/100ML
9 INJECTION, SOLUTION INTRAVENOUS CONTINUOUS
Status: DISCONTINUED | OUTPATIENT
Start: 2019-10-04 | End: 2019-10-04 | Stop reason: HOSPADM

## 2019-10-04 RX ORDER — LIDOCAINE HYDROCHLORIDE 20 MG/ML
INJECTION, SOLUTION INFILTRATION; PERINEURAL AS NEEDED
Status: DISCONTINUED | OUTPATIENT
Start: 2019-10-04 | End: 2019-10-04 | Stop reason: SURG

## 2019-10-04 RX ORDER — LIDOCAINE HYDROCHLORIDE 40 MG/ML
SOLUTION TOPICAL AS NEEDED
Status: DISCONTINUED | OUTPATIENT
Start: 2019-10-04 | End: 2019-10-04 | Stop reason: SURG

## 2019-10-04 RX ORDER — OXYCODONE AND ACETAMINOPHEN 7.5; 325 MG/1; MG/1
1 TABLET ORAL ONCE AS NEEDED
Status: COMPLETED | OUTPATIENT
Start: 2019-10-04 | End: 2019-10-04

## 2019-10-04 RX ORDER — HYDRALAZINE HYDROCHLORIDE 20 MG/ML
5 INJECTION INTRAMUSCULAR; INTRAVENOUS
Status: DISCONTINUED | OUTPATIENT
Start: 2019-10-04 | End: 2019-10-04 | Stop reason: HOSPADM

## 2019-10-04 RX ORDER — SUCCINYLCHOLINE CHLORIDE 20 MG/ML
INJECTION INTRAMUSCULAR; INTRAVENOUS AS NEEDED
Status: DISCONTINUED | OUTPATIENT
Start: 2019-10-04 | End: 2019-10-04 | Stop reason: SURG

## 2019-10-04 RX ORDER — ACETAMINOPHEN 325 MG/1
650 TABLET ORAL ONCE AS NEEDED
Status: DISCONTINUED | OUTPATIENT
Start: 2019-10-04 | End: 2019-10-04 | Stop reason: HOSPADM

## 2019-10-04 RX ORDER — PROMETHAZINE HYDROCHLORIDE 25 MG/1
25 TABLET ORAL ONCE AS NEEDED
Status: DISCONTINUED | OUTPATIENT
Start: 2019-10-04 | End: 2019-10-04 | Stop reason: HOSPADM

## 2019-10-04 RX ORDER — HYDROCODONE BITARTRATE AND ACETAMINOPHEN 5; 325 MG/1; MG/1
1 TABLET ORAL EVERY 4 HOURS PRN
Qty: 35 TABLET | Refills: 0
Start: 2019-10-04 | End: 2022-01-01

## 2019-10-04 RX ORDER — LIDOCAINE HYDROCHLORIDE 10 MG/ML
0.5 INJECTION, SOLUTION EPIDURAL; INFILTRATION; INTRACAUDAL; PERINEURAL ONCE AS NEEDED
Status: DISCONTINUED | OUTPATIENT
Start: 2019-10-04 | End: 2019-10-04 | Stop reason: HOSPADM

## 2019-10-04 RX ORDER — FAMOTIDINE 10 MG/ML
20 INJECTION, SOLUTION INTRAVENOUS ONCE
Status: COMPLETED | OUTPATIENT
Start: 2019-10-04 | End: 2019-10-04

## 2019-10-04 RX ORDER — FENTANYL CITRATE 50 UG/ML
50 INJECTION, SOLUTION INTRAMUSCULAR; INTRAVENOUS
Status: DISCONTINUED | OUTPATIENT
Start: 2019-10-04 | End: 2019-10-04 | Stop reason: HOSPADM

## 2019-10-04 RX ORDER — SODIUM CHLORIDE, SODIUM LACTATE, POTASSIUM CHLORIDE, CALCIUM CHLORIDE 600; 310; 30; 20 MG/100ML; MG/100ML; MG/100ML; MG/100ML
INJECTION, SOLUTION INTRAVENOUS CONTINUOUS PRN
Status: DISCONTINUED | OUTPATIENT
Start: 2019-10-04 | End: 2019-10-04 | Stop reason: SURG

## 2019-10-04 RX ORDER — ONDANSETRON 2 MG/ML
4 INJECTION INTRAMUSCULAR; INTRAVENOUS ONCE AS NEEDED
Status: DISCONTINUED | OUTPATIENT
Start: 2019-10-04 | End: 2019-10-04 | Stop reason: HOSPADM

## 2019-10-04 RX ORDER — SODIUM CHLORIDE 0.9 % (FLUSH) 0.9 %
3 SYRINGE (ML) INJECTION EVERY 12 HOURS SCHEDULED
Status: DISCONTINUED | OUTPATIENT
Start: 2019-10-04 | End: 2019-10-04 | Stop reason: HOSPADM

## 2019-10-04 RX ORDER — CEFAZOLIN SODIUM 2 G/100ML
2 INJECTION, SOLUTION INTRAVENOUS ONCE
Status: COMPLETED | OUTPATIENT
Start: 2019-10-04 | End: 2019-10-04

## 2019-10-04 RX ORDER — MEPERIDINE HYDROCHLORIDE 25 MG/ML
12.5 INJECTION INTRAMUSCULAR; INTRAVENOUS; SUBCUTANEOUS
Status: DISCONTINUED | OUTPATIENT
Start: 2019-10-04 | End: 2019-10-04 | Stop reason: HOSPADM

## 2019-10-04 RX ORDER — LABETALOL HYDROCHLORIDE 5 MG/ML
5 INJECTION, SOLUTION INTRAVENOUS
Status: DISCONTINUED | OUTPATIENT
Start: 2019-10-04 | End: 2019-10-04 | Stop reason: HOSPADM

## 2019-10-04 RX ORDER — ONDANSETRON 2 MG/ML
INJECTION INTRAMUSCULAR; INTRAVENOUS AS NEEDED
Status: DISCONTINUED | OUTPATIENT
Start: 2019-10-04 | End: 2019-10-04 | Stop reason: SURG

## 2019-10-04 RX ORDER — FLUMAZENIL 0.1 MG/ML
0.2 INJECTION INTRAVENOUS AS NEEDED
Status: DISCONTINUED | OUTPATIENT
Start: 2019-10-04 | End: 2019-10-04 | Stop reason: HOSPADM

## 2019-10-04 RX ORDER — DEXAMETHASONE SODIUM PHOSPHATE 10 MG/ML
INJECTION INTRAMUSCULAR; INTRAVENOUS AS NEEDED
Status: DISCONTINUED | OUTPATIENT
Start: 2019-10-04 | End: 2019-10-04 | Stop reason: SURG

## 2019-10-04 RX ORDER — MIDAZOLAM HYDROCHLORIDE 1 MG/ML
1 INJECTION INTRAMUSCULAR; INTRAVENOUS
Status: DISCONTINUED | OUTPATIENT
Start: 2019-10-04 | End: 2019-10-04 | Stop reason: HOSPADM

## 2019-10-04 RX ORDER — DIPHENHYDRAMINE HCL 25 MG
25 CAPSULE ORAL
Status: DISCONTINUED | OUTPATIENT
Start: 2019-10-04 | End: 2019-10-04 | Stop reason: HOSPADM

## 2019-10-04 RX ORDER — MIDAZOLAM HYDROCHLORIDE 1 MG/ML
2 INJECTION INTRAMUSCULAR; INTRAVENOUS
Status: DISCONTINUED | OUTPATIENT
Start: 2019-10-04 | End: 2019-10-04 | Stop reason: HOSPADM

## 2019-10-04 RX ORDER — EPHEDRINE SULFATE 50 MG/ML
5 INJECTION, SOLUTION INTRAVENOUS ONCE AS NEEDED
Status: DISCONTINUED | OUTPATIENT
Start: 2019-10-04 | End: 2019-10-04 | Stop reason: HOSPADM

## 2019-10-04 RX ORDER — SODIUM CHLORIDE 0.9 % (FLUSH) 0.9 %
3-10 SYRINGE (ML) INJECTION AS NEEDED
Status: DISCONTINUED | OUTPATIENT
Start: 2019-10-04 | End: 2019-10-04 | Stop reason: HOSPADM

## 2019-10-04 RX ORDER — PROPOFOL 10 MG/ML
VIAL (ML) INTRAVENOUS AS NEEDED
Status: DISCONTINUED | OUTPATIENT
Start: 2019-10-04 | End: 2019-10-04 | Stop reason: SURG

## 2019-10-04 RX ORDER — KETOROLAC TROMETHAMINE 30 MG/ML
INJECTION, SOLUTION INTRAMUSCULAR; INTRAVENOUS AS NEEDED
Status: DISCONTINUED | OUTPATIENT
Start: 2019-10-04 | End: 2019-10-04 | Stop reason: SURG

## 2019-10-04 RX ADMIN — FENTANYL CITRATE 50 MCG: 50 INJECTION INTRAMUSCULAR; INTRAVENOUS at 12:05

## 2019-10-04 RX ADMIN — CEFAZOLIN SODIUM 1 G: 2 INJECTION, SOLUTION INTRAVENOUS at 11:04

## 2019-10-04 RX ADMIN — HYDROMORPHONE HYDROCHLORIDE 0.5 MG: 1 INJECTION, SOLUTION INTRAMUSCULAR; INTRAVENOUS; SUBCUTANEOUS at 12:24

## 2019-10-04 RX ADMIN — FENTANYL CITRATE 50 MCG: 50 INJECTION INTRAMUSCULAR; INTRAVENOUS at 10:03

## 2019-10-04 RX ADMIN — DEXAMETHASONE SODIUM PHOSPHATE 10 MG: 10 INJECTION INTRAMUSCULAR; INTRAVENOUS at 10:03

## 2019-10-04 RX ADMIN — FENTANYL CITRATE 50 MCG: 50 INJECTION INTRAMUSCULAR; INTRAVENOUS at 11:28

## 2019-10-04 RX ADMIN — ONDANSETRON 4 MG: 2 INJECTION INTRAMUSCULAR; INTRAVENOUS at 10:02

## 2019-10-04 RX ADMIN — SODIUM CHLORIDE, POTASSIUM CHLORIDE, SODIUM LACTATE AND CALCIUM CHLORIDE: 600; 310; 30; 20 INJECTION, SOLUTION INTRAVENOUS at 09:20

## 2019-10-04 RX ADMIN — Medication 2 MG: at 09:57

## 2019-10-04 RX ADMIN — SODIUM CHLORIDE, POTASSIUM CHLORIDE, SODIUM LACTATE AND CALCIUM CHLORIDE 9 ML/HR: 600; 310; 30; 20 INJECTION, SOLUTION INTRAVENOUS at 08:30

## 2019-10-04 RX ADMIN — SODIUM CHLORIDE, POTASSIUM CHLORIDE, SODIUM LACTATE AND CALCIUM CHLORIDE: 600; 310; 30; 20 INJECTION, SOLUTION INTRAVENOUS at 10:43

## 2019-10-04 RX ADMIN — LIDOCAINE HYDROCHLORIDE 80 MG: 20 INJECTION, SOLUTION INFILTRATION; PERINEURAL at 10:03

## 2019-10-04 RX ADMIN — FENTANYL CITRATE 50 MCG: 50 INJECTION INTRAMUSCULAR; INTRAVENOUS at 10:54

## 2019-10-04 RX ADMIN — HYDROMORPHONE HYDROCHLORIDE 0.5 MG: 1 INJECTION, SOLUTION INTRAMUSCULAR; INTRAVENOUS; SUBCUTANEOUS at 12:01

## 2019-10-04 RX ADMIN — FENTANYL CITRATE 50 MCG: 50 INJECTION INTRAMUSCULAR; INTRAVENOUS at 09:10

## 2019-10-04 RX ADMIN — FENTANYL CITRATE 50 MCG: 50 INJECTION INTRAMUSCULAR; INTRAVENOUS at 10:15

## 2019-10-04 RX ADMIN — SUCCINYLCHOLINE CHLORIDE 160 MG: 20 INJECTION, SOLUTION INTRAMUSCULAR; INTRAVENOUS; PARENTERAL at 10:03

## 2019-10-04 RX ADMIN — OXYCODONE HYDROCHLORIDE AND ACETAMINOPHEN 1 TABLET: 7.5; 325 TABLET ORAL at 11:55

## 2019-10-04 RX ADMIN — FENTANYL CITRATE 50 MCG: 50 INJECTION INTRAMUSCULAR; INTRAVENOUS at 11:41

## 2019-10-04 RX ADMIN — PROPOFOL 200 MG: 10 INJECTION, EMULSION INTRAVENOUS at 10:03

## 2019-10-04 RX ADMIN — LIDOCAINE HYDROCHLORIDE 1 EACH: 40 SOLUTION TOPICAL at 10:05

## 2019-10-04 RX ADMIN — KETOROLAC TROMETHAMINE 30 MG: 30 INJECTION, SOLUTION INTRAMUSCULAR; INTRAVENOUS at 11:05

## 2019-10-04 RX ADMIN — FENTANYL CITRATE 50 MCG: 50 INJECTION INTRAMUSCULAR; INTRAVENOUS at 11:59

## 2019-10-04 RX ADMIN — HYDROMORPHONE HYDROCHLORIDE 0.5 MG: 1 INJECTION, SOLUTION INTRAMUSCULAR; INTRAVENOUS; SUBCUTANEOUS at 11:47

## 2019-10-04 RX ADMIN — FENTANYL CITRATE 50 MCG: 50 INJECTION INTRAMUSCULAR; INTRAVENOUS at 11:54

## 2019-10-04 RX ADMIN — ROCURONIUM BROMIDE 10 MG: 10 INJECTION INTRAVENOUS at 10:03

## 2019-10-04 RX ADMIN — HYDROMORPHONE HYDROCHLORIDE 0.5 MG: 1 INJECTION, SOLUTION INTRAMUSCULAR; INTRAVENOUS; SUBCUTANEOUS at 11:33

## 2019-10-04 RX ADMIN — FAMOTIDINE 20 MG: 10 INJECTION INTRAVENOUS at 09:10

## 2019-10-04 RX ADMIN — CEFAZOLIN SODIUM 2 G: 2 INJECTION, SOLUTION INTRAVENOUS at 09:57

## 2019-10-04 NOTE — BRIEF OP NOTE
LUMBAR DISCECTOMY POSTERIOR WITH METRIX  Progress Note    Renaldo BLUM Jennifer  10/4/2019    Pre-op Diagnosis:   Neuritis or radiculitis due to rupture of lumbar intervertebral disc [M51.16]       Post-Op Diagnosis Codes:     * Neuritis or radiculitis due to rupture of lumbar intervertebral disc [M51.16]    Procedure/CPT® Codes:      Procedure(s):  Right lumbar 4 to lumbar 5 laminectomy and discectomy with metrx    Surgeon(s):  Ghanshyam Canas MD    Anesthesia: General    Staff:   Circulator: Yaneth Ruby RN; Vincenzo Ellsworth RN; Iza Moore RN  Radiology Technologist: Rigo Moore RRT  Scrub Person: Gabriela Mi; Ramon Rhodes  Assistant: Rachel Menchaca CSA  Other: Gabriela Seo RN    Estimated Blood Loss: 100ml    Urine Voided: * No values recorded between 10/4/2019  9:50 AM and 10/4/2019 11:16 AM *    Specimens:                None          Drains:      Findings: large HNP    Complications: none      Ghanshyam Canas MD     Date: 10/4/2019  Time: 11:20 AM

## 2019-10-04 NOTE — OP NOTE
Preop diagnosis: Herniated disc with lumbar radiculopathy right L4-5    Postop diagnosis: same    Procedure performed: Right L4-5 laminectomy, foraminotomies, medial facetectomy and discectomy using minimal access spinal technologies microsurgical technique microsurgical instrumentation    Surgeon: Ghanshyam Canas M.D.    Assistant: Rachel Menchaca CFA who was instrumental in helping with visualization of neural structures, hemostasis, and retraction of neural structures.    Indications for the procedure:  This is a patient with severe pain in the legs.  Previous imaging had shown neural compression at the L4-5 levels.  As a result of this and the failure of conservative therapy the patient elected to proceed with surgery.    Operative summary:  After induction of general anesthesia the patient was intubated and placed on the operating table in the prone position on a Bonifacio table.  All pressure points were padded including peripheral points of entrapment.  The back was prepped with Chloraprep and then draped with Ioban, half sheets, and a split sheet.      The L4-5 level was localized with intraoperative fluoroscopy an incision was made on the right just above the pedicle.  Successive dilating tubes up to 18 mm by 7 cm were placed over that area.  Soft tissue was then removed from the supralaminar space.  The inferior laminar arch of L4 as well as the superior laminar arch of L5 and the medial aspect of facet were removed with the Akimbo drill.  The remainder of the operation was done under high-power magnification of the operating microscope using microsurgical technique and microsurgical instrumentation.  The ligamentum flavum was opened and removed out to the level of the pedicle using the Kerrison rongeurs.  This exposed the lateral thecal sac and the nerve root of L5.  Once the lateral recess was opened the dissection was carried up to the L4 pedicle completely decompressing the superior nerve root.    Once  this was done the L4 and L5 nerve root were mobilized medially to expose the disc.  There was evidence of a large disc herniation compressing the nerve just under the nerve root.  It also  migrated superiorly and was compressing the L4 nerve root as well.  This was carefully teased out and then the disc space was incised and disc material was removed with the down-biting cervical curettes and the pituitary rongeurs.  Once the disc space was emptied as much as possible bleeding was controlled with bipolar cautery.    Once the entire decompression was completed we were able to explore under the nerve root and the thecal sac using the Ward ball probe to be sure there was no evidence of residual compression.there being none bleeding was controlled again using the bipolar cautery, FloSeal, and thrombin and Gelfoam.  The area was then copiously irrigated with bacitracin solution and the tube was removed. The paraspinous musculature was injected with 100 cc 1/8% Marcaine with 1:200,000 epinephrine solution.    Another gram of Kefzol was given prior to closure.    The incision was then closed in layersdressed and the patient was taken to the recovery room in stable condition there were no apparent complications.  Sponge, instrument, and needle counts were correct at the end of the procedure.

## 2019-10-04 NOTE — ANESTHESIA PREPROCEDURE EVALUATION
Anesthesia Evaluation     Patient summary reviewed and Nursing notes reviewed                Airway   Mallampati: III  Dental      Pulmonary - negative pulmonary ROS   Cardiovascular - negative cardio ROS    Rhythm: regular  Rate: normal        Neuro/Psych  (+) numbness,     GI/Hepatic/Renal/Endo    (+) obesity,       Musculoskeletal (-) negative ROS    Abdominal    Substance History - negative use     OB/GYN negative ob/gyn ROS         Other                        Anesthesia Plan    ASA 3     general     intravenous induction   Anesthetic plan, all risks, benefits, and alternatives have been provided, discussed and informed consent has been obtained with: patient.

## 2019-10-04 NOTE — ANESTHESIA POSTPROCEDURE EVALUATION
Patient: Renaldo Rodriguez    Procedure Summary     Date:  10/04/19 Room / Location:  [facility] OR [address] [place] [address] MAIN OR    Anesthesia Start:  0955 Anesthesia Stop:  1129    Procedure:  Right lumbar 4 to lumbar 5 laminectomy and discectomy with metrx (Right Spine Lumbar) Diagnosis:       Neuritis or radiculitis due to rupture of lumbar intervertebral disc      (Neuritis or radiculitis due to rupture of lumbar intervertebral disc [M51.16])    Surgeon:  Ghanshyam Canas MD Provider:  Obie Olguin MD    Anesthesia Type:  general ASA Status:  3          Anesthesia Type: general  Last vitals  BP   146/99 (10/04/19 1140)   Temp   36.9 °C (98.4 °F) (10/04/19 1127)   Pulse   69 (10/04/19 1140)   Resp   16 (10/04/19 1140)     SpO2   100 % (10/04/19 1140)     Post Anesthesia Care and Evaluation    Patient location during evaluation: PACU  Patient participation: complete - patient participated  Level of consciousness: awake and alert  Pain management: adequate  Airway patency: patent  Anesthetic complications: No anesthetic complications    Cardiovascular status: acceptable  Respiratory status: acceptable  Hydration status: acceptable    Comments: --------------------            10/04/19               1140     --------------------   BP:       146/99     Pulse:      69       Resp:       16       Temp:                SpO2:      100%     --------------------

## 2019-10-04 NOTE — TELEPHONE ENCOUNTER
Pt had Lumbar discectomy today with Dr. Canas. He was given a script for Keflex 500 mg. and did not know when to start taking it. Pt number is [phone]

## 2019-10-04 NOTE — DISCHARGE INSTRUCTIONS
You were given Percocet for pain at 11:55 AM       LUMBAR SURGERY - ARIES NEWMAN M.D.  [address], [address]  [place] [address]  [phone]    Instructions & Care After Your Lumbar Surgery    1. No sitting except on the commode.  You may lie on a firm couch but not on a waterbed or recliner.  You may lie in any position that is comfortable, using only one pillow under your head. Either stand at a counter or lie on your side for meals. Three weeks after surgery you may begin sitting for 30 minutes 3 times per day.    2. No driving for three weeks.  You may ride in the car in a passenger seat that reclines or lying down in the back seat.      3. No bending. If you drop something allow someone else to pick it up.    4. Don’t lift anything heavier than a coffee cup or paperback book.    5. Gradually increase your activity each day.  You should get out of bed every hour during the day.  Walk outside as soon as you feel up to it.  Walk short distances frequently rather than making a long trip.  Frequency is more important than distance.  Your goal is to be walking 2 to 3 miles per day when you return for your post-operative visit. (Never do this in one trip.)    6. You may climb stairs.    7. Remove your bandage the second day after surgery and leave it off.  If you notice any redness, swelling or drainage, call the office.  There are steri-strips across the incision.  If these are still present ten days after surgery, remove them gently.      8. You may shower five days after surgery.  Keep the incision dry until then.  Don’t let the water beat directly on the incision and gently pat it dry.    9. Physical Therapy will be arranged at your post-operative visit if needed.    10. Your prescription for pain medication may be filled for half the original amount prior to your return office visit.  Due to changes in Federal Law in order to have this medication refilled you must contact the office four days  prior to the date and make arrangements to pick the prescription up in the office.  This prescription refill cannot be called in to the pharmacy. Your prescription will be ready for pick-up the day the refill is due.    Don’t be alarmed if you experience some of your pre-operative symptoms after going home.  This is not uncommon and normally goes away in a few days but may last longer.  If you have any questions or concerns, please call our office.

## 2019-10-04 NOTE — TELEPHONE ENCOUNTER
LVM on his and his wife's phone he can start the medication now. Both phones had messages that stated who they were.

## 2019-10-04 NOTE — ANESTHESIA PROCEDURE NOTES
Airway  Urgency: elective    Date/Time: 10/4/2019 10:05 AM  Airway not difficult    General Information and Staff    Patient location during procedure: OR  Anesthesiologist: Obie Olguin MD  CRNA: Sagar Owens CRNA    Indications and Patient Condition  Indications for airway management: airway protection    Preoxygenated: yes  MILS not maintained throughout  Mask difficulty assessment: 1 - vent by mask    Final Airway Details  Final airway type: endotracheal airway      Successful airway: ETT and reinforced tube  Cuffed: yes   Successful intubation technique: direct laryngoscopy  Facilitating devices/methods: anterior pressure/BURP  Endotracheal tube insertion site: oral  Blade: Darek  Blade size: 3  ETT size (mm): 7.5  Cormack-Lehane Classification: grade I - full view of glottis  Placement verified by: chest auscultation   Cuff volume (mL): 8  Measured from: lips  ETT/EBT  to lips (cm): 21  Number of attempts at approach: 1    Additional Comments  Pre O2, SIAI

## 2019-10-07 ENCOUNTER — TELEPHONE (OUTPATIENT)
Dept: NEUROSURGERY | Facility: CLINIC | Age: 54
End: 2019-10-07

## 2019-10-07 RX ORDER — DEXAMETHASONE 1.5 MG/1
TABLET ORAL
Qty: 51 TABLET | Refills: 0 | Status: SHIPPED | OUTPATIENT
Start: 2019-10-07

## 2019-10-07 NOTE — TELEPHONE ENCOUNTER
Araceli-I see that he is following up with Katie. Did you mean to send this to me?            How are you feeling? Better spirits, still has nerve pain. He was able to walk approximately 1 mile.    Are you having any pain? Where? Leg pain, incision feels good.    Rate pain from 1-10 - 8 in his right leg    Are you taking the pain RX? Yes every 4 hours regardless if he needs them even at night. I told him he should try taking them less and only if he has pain but not to wait until he is in severe pain that he cannot stand it any longer. I explained Dr. Canas' pain medication policy. He verbalized understanding.     Do you think it's helping? He is unsure If medication is truly helping.    Do you feel better than before surgery? Yes with the exception of his leg pain    Was your dressing clean and dry? Yes    Dermabond OK? Yes    Is you incision red, swollen or bleeding? No    Are you having any trouble with nausea or constipation? Yes constipation, told him he can try OTC stool softener's such as colace. I told him if it is another few days with no relief he can get some magnesium citrate which should clean him out. He verbalized understanding.     Were your discharge instructions easy to understand? Yes    Any other questions? Yes, wants something for nerve pain.    Confirm post op appt - Yes 10/22/2019 with Katie

## 2019-10-07 NOTE — TELEPHONE ENCOUNTER
Patient called today regarding another issue and let me know that he received my message on Friday and started his antibiotics.

## 2019-10-09 ENCOUNTER — TELEPHONE (OUTPATIENT)
Dept: NEUROSURGERY | Facility: CLINIC | Age: 54
End: 2019-10-09

## 2019-10-09 RX ORDER — GABAPENTIN 300 MG/1
300 CAPSULE ORAL NIGHTLY
Qty: 30 CAPSULE | Refills: 2 | Status: SHIPPED | OUTPATIENT
Start: 2019-10-09 | End: 2019-10-22 | Stop reason: SDUPTHER

## 2019-10-09 NOTE — TELEPHONE ENCOUNTER
How are you feeling? very frustrated    Are you having any pain? Where? Ankle and left leg pain. Feels like someone is sticking a knife in his ankle. Sharp pain    Rate pain from 1-10 - 8    Are you taking the pain RX? Taking pain RX at night & taking steroids as directed    Do you think it's helping? No    Do you feel better than before surgery? Yes, because he can walk but that particular part of his leg is no better. He is having difficulty sleeping    Dermabond OK? yes    Is you incision red, swollen or bleeding? None, no fever    Are you having any trouble with nausea or constipation? none    Were your discharge instructions easy to understand? yes    Any other questions?    Confirm post op appt - yes

## 2019-10-09 NOTE — TELEPHONE ENCOUNTER
Have him try gabapentin 300 mg at HS. Tell him it is for nerve pain which will take time to get better. Let us know if he is not feeling any improvement by Monday. We may need to increase dose.

## 2019-10-14 ENCOUNTER — TELEPHONE (OUTPATIENT)
Dept: NEUROSURGERY | Facility: CLINIC | Age: 54
End: 2019-10-14

## 2019-10-14 NOTE — TELEPHONE ENCOUNTER
Please verify that he is still taking the DexPak steroid taper. Instruct him to take two gabapentin at HS. He will need to be seen in office next Monday to discuss progress and re-order gabapentin before he runs out.

## 2019-10-14 NOTE — TELEPHONE ENCOUNTER
Pt had R L4/5 laminectomy with metrix on 10/4 with Dr Canas.  Was put on Gabapentin 300mg qhs and told to report back.  He sees little improvement and still has pain in the R ankle/leg.  [phone]

## 2019-10-16 NOTE — PROGRESS NOTES
Subjective   History of Present Illness: Renaldo Rodriguez is a 54 y.o. male is here today for post-op follow-up.     Mr. Rodriguez is 18 days out from a Right L4-5 laminectomy, foraminotomies, medial facetectomy and discectomy using minimal access spinal technologies microsurgical technique microsurgical instrumentation done by Dr. Canas on 10/4/2019.    He has the typical postop back pain. He still has pain to the right lateral leg and some tingling in the ankle.     History of Present Illness    The following portions of the patient's history were reviewed and updated as appropriate: allergies, current medications, past surgical history and problem list.    Review of Systems   Constitutional: Positive for activity change.   Genitourinary: Negative for difficulty urinating and enuresis.   Musculoskeletal: Positive for back pain (R leg to ankle) and gait problem.   Skin: Negative for wound.   Neurological: Positive for weakness (R leg) and numbness (R leg tingling).   Psychiatric/Behavioral: Positive for sleep disturbance (R leg).   All other systems reviewed and are negative.      Objective     Vitals:    10/22/19 1112   BP: 120/70   Pulse: 68   Weight: 108 kg (239 lb)     Body mass index is 35.29 kg/m².      Physical Exam   Constitutional: He is oriented to person, place, and time. He appears well-developed and well-nourished. No distress.   Pulmonary/Chest: Effort normal.   Musculoskeletal: He exhibits no edema.   Neurological: He is alert and oriented to person, place, and time. He has normal strength. Gait normal.   Reflex Scores:       Patellar reflexes are 0 on the right side and 0 on the left side.       Achilles reflexes are 1+ on the right side and 1+ on the left side.  Skin: Skin is warm and dry. No erythema.   Psychiatric: He has a normal mood and affect.     Neurologic Exam     Mental Status   Oriented to person, place, and time.     Motor Exam   Muscle bulk: normal  Overall muscle tone: normal    Strength    Strength 5/5 throughout.     Sensory Exam   Light touch normal.     Gait, Coordination, and Reflexes     Gait  Gait: normal    Reflexes   Right patellar: 0  Left patellar: 0  Right achilles: 1+  Left achilles: 1+    Low lumbar incision healing well, no erythema or drainage    Assessment/Plan   Independent Review of Radiographic Studies:      I personally reviewed the images from the following studies.    No imaging    Medical Decision Making:      He can now lift up to 15 pounds.  He can drive since he is not taking narcotic pain medication.  We will have him complete a short course of therapy.  We will see him back in 1 month.  He will remain off of work for now and when he is seen back we will likely release him back to half days for a month then return to full duty if he continues to do well.  Renaldo was seen today for post-op.    Diagnoses and all orders for this visit:    Neuritis or radiculitis due to rupture of lumbar intervertebral disc      Return in about 4 weeks (around 11/19/2019).

## 2019-10-22 ENCOUNTER — TELEPHONE (OUTPATIENT)
Dept: NEUROSURGERY | Facility: CLINIC | Age: 54
End: 2019-10-22

## 2019-10-22 ENCOUNTER — OFFICE VISIT (OUTPATIENT)
Dept: NEUROSURGERY | Facility: CLINIC | Age: 54
End: 2019-10-22

## 2019-10-22 VITALS
BODY MASS INDEX: 35.29 KG/M2 | WEIGHT: 239 LBS | SYSTOLIC BLOOD PRESSURE: 120 MMHG | HEART RATE: 68 BPM | DIASTOLIC BLOOD PRESSURE: 70 MMHG

## 2019-10-22 DIAGNOSIS — M51.16 NEURITIS OR RADICULITIS DUE TO RUPTURE OF LUMBAR INTERVERTEBRAL DISC: Primary | ICD-10-CM

## 2019-10-22 PROCEDURE — 99024 POSTOP FOLLOW-UP VISIT: CPT | Performed by: NURSE PRACTITIONER

## 2019-10-22 RX ORDER — GABAPENTIN 300 MG/1
300 CAPSULE ORAL NIGHTLY
Qty: 30 CAPSULE | Refills: 2 | Status: SHIPPED | OUTPATIENT
Start: 2019-10-22 | End: 2019-10-23

## 2019-10-22 NOTE — TELEPHONE ENCOUNTER
Pt is running out of his Gabapentin since he has to take more. The pharmacy said he cannot get a refill until 11/4 unless we call them with the change that was made   [address]

## 2019-10-22 NOTE — TELEPHONE ENCOUNTER
Pt saw you today and needs his Gabapentin 300mg 2hs called to pharm as it was increased by LT on 10/14 and they will not RF until 11/4.

## 2019-10-23 RX ORDER — GABAPENTIN 300 MG/1
600 CAPSULE ORAL NIGHTLY
Qty: 60 CAPSULE | Refills: 2 | Status: SHIPPED | OUTPATIENT
Start: 2019-10-23 | End: 2022-01-01

## 2019-10-23 NOTE — TELEPHONE ENCOUNTER
I called the pharmacy and was told the Rx sent 10/22 was incorrect as it only had him taking 1 po Qhs, I looked and it appeared correct to me, but I resent it with 600mg (2 300mg tabs) QHS, dispense 60 with 2 refills

## 2019-11-18 NOTE — PROGRESS NOTES
"Subjective   History of Present Illness: Renaldo Rodriguez is a 54 y.o. male is here today for follow-up.  He is 6 weeks out from a right L4-5 laminectomy by Dr. Canas 10/4/19. He is currently going to PT twice a week. He still has a pressure feeling in R ankle but no pain.  He would like to know if weight restrictions are still 15 pounds and how long will he need to continue Gabapentin. Mr. Rodriguez takes Gabapentin 600 mg HS.       History of Present Illness    The following portions of the patient's history were reviewed and updated as appropriate: allergies, current medications, past surgical history and problem list.    Review of Systems    Objective     Vitals:    11/20/19 1427   BP: 126/78   Pulse: 74   Weight: 108 kg (239 lb)   Height: 175.3 cm (69\")     Body mass index is 35.29 kg/m².      Physical Exam   Constitutional: He is oriented to person, place, and time. He appears well-developed and well-nourished.   Pulmonary/Chest: Effort normal.   Musculoskeletal: Normal range of motion.   Neurological: He is alert and oriented to person, place, and time. He has normal strength. Gait normal.   Reflex Scores:       Patellar reflexes are 1+ on the right side and 1+ on the left side.       Achilles reflexes are 1+ on the right side and 1+ on the left side.  Skin: Skin is warm and dry.   Psychiatric: He has a normal mood and affect.     Neurologic Exam     Mental Status   Oriented to person, place, and time.     Motor Exam   Muscle bulk: normal  Overall muscle tone: normal    Strength   Strength 5/5 throughout.   Right anterior tibial: 5/5  Left anterior tibial: 5/5  Right gastroc: 5/5  Left gastroc: 5/5    Sensory Exam   Light touch normal.     Gait, Coordination, and Reflexes     Gait  Gait: normal    Reflexes   Right patellar: 1+  Left patellar: 1+  Right achilles: 1+  Left achilles: 1+        Assessment/Plan   Independent Review of Radiographic Studies:      I personally reviewed the images from the following " studies.    No new imaging    Medical Decision Making:      He is completing therapy next week. He can now lift up to 25 pounds. He is doing well from surgery with resolution of the radicular pain. He is going to return to work 1/2 days for two weeks starting 12-19. He has some dysesthesia in the inner right ankle and we discussed that sensation is the last thing to get heel because it is the most sensitive part of the nerve and he can continue to heal for up to year. He will wean off of the neurontin over the next week.  We will see him back in 4 weeks to see how the half days go and then likely return him to full duty.    Renaldo was seen today for post-op.    Diagnoses and all orders for this visit:    Neuritis or radiculitis due to rupture of lumbar intervertebral disc    Lumbar radiculopathy      Return in about 4 weeks (around 12/18/2019).

## 2019-11-20 ENCOUNTER — OFFICE VISIT (OUTPATIENT)
Dept: NEUROSURGERY | Facility: CLINIC | Age: 54
End: 2019-11-20

## 2019-11-20 ENCOUNTER — TELEPHONE (OUTPATIENT)
Dept: NEUROSURGERY | Facility: CLINIC | Age: 54
End: 2019-11-20

## 2019-11-20 VITALS
HEIGHT: 69 IN | BODY MASS INDEX: 35.4 KG/M2 | HEART RATE: 74 BPM | SYSTOLIC BLOOD PRESSURE: 126 MMHG | DIASTOLIC BLOOD PRESSURE: 78 MMHG | WEIGHT: 239 LBS

## 2019-11-20 DIAGNOSIS — M51.16 NEURITIS OR RADICULITIS DUE TO RUPTURE OF LUMBAR INTERVERTEBRAL DISC: Primary | ICD-10-CM

## 2019-11-20 DIAGNOSIS — M54.16 LUMBAR RADICULOPATHY: ICD-10-CM

## 2019-11-20 PROCEDURE — 99024 POSTOP FOLLOW-UP VISIT: CPT | Performed by: NURSE PRACTITIONER

## 2019-11-20 RX ORDER — LORATADINE 10 MG/1
10 TABLET ORAL DAILY
COMMUNITY

## 2019-11-20 NOTE — TELEPHONE ENCOUNTER
Pt called to say that he needs a more detailed work note.     1. How much weight can he lift?  2. How much weight can he pull?    He needs this faxed to attn: Joy   Fax: [phone]

## 2019-11-22 NOTE — TELEPHONE ENCOUNTER
Katie Mcgrath, APRN   You 20 hours ago (5:21 PM)      Please devise a note that states : he can push pull or lift 25 pounds.  Sit/stand at will.  Allow frequent rest periods.

## 2019-12-09 NOTE — PROGRESS NOTES
Subjective   Patient ID: Renaldo Hickey is a 54 y.o. male is here today for P/O 3 of right L4-5 laminectomy by Dr. Canas 10/4/19.  He was c/o dysesthesia in the inner right ankle at his last visit on 11/20/19.  Pt is going to physical therapy.   Pt is still c/o numbness in the R ankle and he feels a knot in it.  Physical therapy is helpful.  Pt states he is having some back aching.    Mr. hickey returns to the office today for postoperative evaluation.  He is now 2 months out from right L4-5 laminectomy by Dr. Lyn.  He was having some continued discomfort in his right ankle.  That has gotten better.  He has since stopped taking the gabapentin with no recurrence in symptoms.  He is tolerated physical therapy well.  He is pleased with his progress.  He has been working with half day restrictions and tolerating well.       Off jabari  Tylenol prn for back p    pT mon ding well    The following portions of the patient's history were reviewed and updated as appropriate: allergies, current medications, past family history, past medical history, past social history, past surgical history and problem list.    Review of Systems   Genitourinary: Negative for difficulty urinating and enuresis.   Musculoskeletal: Positive for back pain (more fatigued/aching). Negative for gait problem.   Neurological: Weakness: R leg. Numbness: R ankle.   Psychiatric/Behavioral: Negative for sleep disturbance.   All other systems reviewed and are negative.      Objective   Physical Exam   Constitutional: He appears well-developed. No distress.   Neurological:   No motor or sensory deficits on exam.  Straight leg raise negative bilaterally.   Skin: Skin is warm and dry.   Lumbar incision is healed.   Psychiatric: He has a normal mood and affect.   Vitals reviewed.    Neurologic Exam    Assessment/Plan   Independent Review of Radiographic Studies:      No new radiographic images to review.    Medical Decision Making:      Mr. Hickey is doing quite  well.  He has completed physical therapy and he is now performing his own home exercises.  He is tolerating work well.  I think that he can be released to normal work duties from here.    The patient was offered a follow-up appointment with Dr. Canas but he declined.  He prefers to just call her office if his symptoms worsen or if he has any questions or concerns.  I think that is reasonable.  We will see him as needed from here.    Renaldo was seen today for post-op.    Diagnoses and all orders for this visit:    Surgical followup visit      Return if symptoms worsen or fail to improve.

## 2019-12-18 ENCOUNTER — OFFICE VISIT (OUTPATIENT)
Dept: NEUROSURGERY | Facility: CLINIC | Age: 54
End: 2019-12-18

## 2019-12-18 VITALS
HEART RATE: 60 BPM | BODY MASS INDEX: 36.29 KG/M2 | HEIGHT: 69 IN | WEIGHT: 245 LBS | DIASTOLIC BLOOD PRESSURE: 76 MMHG | SYSTOLIC BLOOD PRESSURE: 116 MMHG

## 2019-12-18 DIAGNOSIS — Z09 SURGICAL FOLLOWUP VISIT: Primary | ICD-10-CM

## 2019-12-18 PROCEDURE — 99024 POSTOP FOLLOW-UP VISIT: CPT | Performed by: NURSE PRACTITIONER

## 2019-12-18 RX ORDER — IBUPROFEN 200 MG
200 TABLET ORAL EVERY 6 HOURS PRN
COMMUNITY

## 2021-01-01 ENCOUNTER — BULK ORDERING (OUTPATIENT)
Dept: CASE MANAGEMENT | Facility: OTHER | Age: 56
End: 2021-01-01

## 2021-01-01 ENCOUNTER — APPOINTMENT (OUTPATIENT)
Dept: GENERAL RADIOLOGY | Facility: HOSPITAL | Age: 56
End: 2021-01-01

## 2021-01-01 ENCOUNTER — HOSPITAL ENCOUNTER (EMERGENCY)
Facility: HOSPITAL | Age: 56
Discharge: HOME | End: 2021-11-24
Attending: EMERGENCY MEDICINE | Admitting: EMERGENCY MEDICINE

## 2021-01-01 VITALS
WEIGHT: 245 LBS | SYSTOLIC BLOOD PRESSURE: 121 MMHG | BODY MASS INDEX: 36.29 KG/M2 | DIASTOLIC BLOOD PRESSURE: 84 MMHG | HEIGHT: 69 IN | TEMPERATURE: 96.9 F | RESPIRATION RATE: 18 BRPM | OXYGEN SATURATION: 98 % | HEART RATE: 65 BPM

## 2021-01-01 DIAGNOSIS — Z23 IMMUNIZATION DUE: ICD-10-CM

## 2021-01-01 DIAGNOSIS — S56.117A: Primary | ICD-10-CM

## 2021-01-01 PROCEDURE — 99283 EMERGENCY DEPT VISIT LOW MDM: CPT

## 2021-01-01 PROCEDURE — 73070 X-RAY EXAM OF ELBOW: CPT

## 2021-01-01 PROCEDURE — 71046 X-RAY EXAM CHEST 2 VIEWS: CPT | Performed by: FAMILY MEDICINE

## 2021-11-24 NOTE — ED PROVIDER NOTES
EMERGENCY DEPARTMENT ENCOUNTER  Patient was placed in face mask in first look and the following protective measures were taken unless  documented below in the ED course. Patient was wearing facemask when I entered the room and throughout our encounter. I wore full protective equipment throughout this patient encounter including a N95 mask, eye shield, gown and gloves. Hand hygiene was performed before donning protective equipment and after removal when leaving the room.    Room Number:  17/17  Date of encounter:  11/24/2021  PCP: Provider, No Known    HPI:  Context: Renaldo Rodriguez is a 56 y.o. male who presents to the ED c/o chief complaint of right elbow and proximal forearm pain.  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  He tried to lift part of the palate and felt 3 pops in his right dominant medial elbow.  He now has pain in his proximal medial right forearm and right elbow.  He denies numbness or weakness of his right arm.  He states she has pain with extension of his arm and flexion of his right hand.    MEDICAL HISTORY REVIEW  Reviewed in EPIC    PAST MEDICAL HISTORY  Active Ambulatory Problems     Diagnosis Date Noted   • Neuritis or radiculitis due to rupture of lumbar intervertebral disc 09/18/2019     Resolved Ambulatory Problems     Diagnosis Date Noted   • No Resolved Ambulatory Problems     Past Medical History:   Diagnosis Date   • Injury of back    • Lumbar herniated disc    • Numbness and tingling of right lower extremity    • Right leg pain        PAST SURGICAL HISTORY  Past Surgical History:   Procedure Laterality Date   • LUMBAR DISCECTOMY Right 10/4/2019    Procedure: Right lumbar 4 to lumbar 5 laminectomy and discectomy with metrx;  Surgeon: Ghanshyam Canas MD;  Location: [facility];  Service: Neurosurgery   • NO PAST SURGERIES         FAMILY HISTORY  Family History   Problem Relation Age of Onset   • Malig Hyperthermia Neg Hx        SOCIAL HISTORY  Social History      Socioeconomic History   • Marital status:    Tobacco Use   • Smoking status: Never Smoker   • Smokeless tobacco: Never Used   Substance and Sexual Activity   • Alcohol use: Yes     Comment: social   • Drug use: No   • Sexual activity: Defer       ALLERGIES  Patient has no known allergies.    The patient's allergies have been reviewed    REVIEW OF SYSTEMS  All systems reviewed and negative except for those discussed in HPI.     PHYSICAL EXAM  I have reviewed the triage vital signs and nursing notes.  ED Triage Vitals   Temp Heart Rate Resp BP SpO2   11/24/21 0914 11/24/21 0914 11/24/21 0914 11/24/21 0925 11/24/21 0914   96.9 °F (36.1 °C) 70 16 149/88 98 %      Temp src Heart Rate Source Patient Position BP Location FiO2 (%)   -- -- -- -- --              General: Mild distress.  HENT: NCAT, PERRL, Nares patent.  Eyes: no scleral icterus.  Neck: trachea midline, no ROM limitations.  CV: regular rhythm, regular rate.  Respiratory: normal effort, CTAB.  Abdomen: Deferred  : deferred.  Musculoskeletal: no deformity.  Patient's right shoulder and right arm are nontender to palpation.  Patient's distal right biceps tendons are nontender to palpation.  Patient's right lateral elbow is nontender to palpation.  Patient has mild tenderness of his right medial elbow.  There is a mild deformity just distal to this and muscle spasm and tenderness to the medial aspect of his proximal right forearm.  Patient has pain with passive supination and pronation of his right elbow.  His right wrist and hand are nontender to palpation.  He has a 2+ right radial pulse.  He has pain in his proximal forearm and elbow with flexion of his right wrist.  He has pain in his right medial forearm and elbow with flexion of his right fifth fourth and third fingers against resistance.  His sensation in his fingers and thumb are intact.  Neuro: alert, moves all extremities, follows commands.  Skin: warm, dry.    LAB RESULTS  No results found  for this or any previous visit (from the past 24 hour(s)).    I ordered the above labs and reviewed the results.    RADIOLOGY  XR Elbow 2 View Right    Result Date: 11/24/2021  XR ELBOW 2 VW RIGHT-  TWO STANDARD VIEWS OF THE RIGHT ELBOW  CLINICAL INFORMATION: Pain after lifting  FINDINGS: No joint effusion, surrounding soft tissues within normal limits. Joint width preserved. Typical mineralization. No fracture nor dislocation.  CONCLUSION: Normal right elbow  This report was finalized on 11/24/2021 9:55 AM by Dr. Nj Vega M.D.        I ordered the above noted radiological studies. I reviewed the images and results. I agree with the radiologist interpretation.    PROCEDURES  Procedures    MEDICATIONS GIVEN IN ER  Medications - No data to display    PROGRESS, DATA ANALYSIS, CONSULTS, AND MEDICAL DECISION MAKING  A complete history and physical exam have been performed.  All available laboratory and imaging results have been reviewed by myself prior to disposition.    MDM  After the initial H&P, I discussed pertinent information from history and physical exam with patient/family.  Discussed differential diagnosis.  Discussed plan for ED evaluation/work-up/treatment.  All questions answered.  Patient/family is agreeable with plan.  ED Course as of 11/24/21 1600   Wed Nov 24, 2021   0937 Patient presents with right medial forearm and elbow pain after lifting a pallet.  I suspect either a partial tendon avulsion versus muscle strain of his right heel forearm.  We will check a right elbow x-ray to rule out avulsion fracture.  We will put patient in a splint with hand surgery follow-up. [DE]      ED Course User Index  [DE] Victor Hugo Garrido MD       AS OF 16:00 EST VITALS:    BP - 121/84  HR - 65  TEMP - 96.9 °F (36.1 °C)  O2 SATS - 98%    DIAGNOSIS  Final diagnoses:   Strain of flexor muscle, fascia and tendon of right little finger at forearm level, initial encounter         DISPOSITION    DISCHARGE    Patient  discharged in stable condition.    Reviewed implications of results, diagnosis, meds, responsibility to follow up, warning signs and symptoms of possible worsening, potential complications and reasons to return to ER.    Patient/Family voiced understanding of above instructions.    Discussed plan for discharge, as there is no emergent indication for admission. Patient referred to primary care provider for BP management due to today's BP. Pt/family is agreeable and understands need for follow up and repeat testing.  Pt is aware that discharge does not mean that nothing is wrong but it indicates no emergency is present that requires admission and they must continue care with follow-up as given below or physician of their choice.     FOLLOW-UP  Hugo Camargo MD  [address] [id number]  [phone]    Schedule an appointment as soon as possible for a visit            Medication List      Changed    HYDROcodone-acetaminophen 5-325 MG per tablet  Commonly known as: NORCO  Take 1 tablet by mouth Every 4 (Four) Hours As Needed for Moderate Pain  (pain).  What changed: when to take this             Victor Hugo Garrido MD  11/24/21 0679

## 2021-11-24 NOTE — ED NOTES
Pt to ED with c/o pain and weakness to R medial forearm, pain with internal rotation and extension. Redness noted, pt states he had already applied ice PTA. Was at work, pushing a pallet.     Pt wearing face mask during their stay in ER. 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        Rachel Peng, RN  11/24/21 4771

## 2021-11-24 NOTE — ED TRIAGE NOTES
Pt reports he was working and attempted to lift something and felt a pop in his right forearm.     Pt was wearing a mask during assessment.  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

## 2021-11-24 NOTE — DISCHARGE INSTRUCTIONS
Continue your home medications and follow-up with orthopedist.  Call today for an appointment.  Wear the sling for comfort and ice your forearm.  Please return to the emergency department if symptoms worsen with increasing pain, weakness or numbness.

## 2021-11-24 NOTE — ED PROVIDER NOTES
Splint - Cast - Strapping    Date/Time: 11/24/2021 10:17 AM  Performed by: Mal Garcia PA  Authorized by: Victor Hugo Garrido MD     Consent:     Consent obtained:  Verbal    Consent given by:  Patient  Pre-procedure details:     Sensation:  Normal  Procedure details:     Laterality:  Right    Location:  Elbow    Elbow:  R elbow    Splint type:  Long arm    Supplies:  Ortho-Glass, elastic bandage, sling and cotton padding  Post-procedure details:     Pain:  Improved    Sensation:  Normal    Patient tolerance of procedure:  Tolerated well, no immediate complications           Mal Garcia PA  11/24/21 1336

## 2022-01-01 ENCOUNTER — APPOINTMENT (OUTPATIENT)
Dept: CT IMAGING | Facility: HOSPITAL | Age: 57
End: 2022-01-01

## 2022-01-01 ENCOUNTER — APPOINTMENT (OUTPATIENT)
Dept: GENERAL RADIOLOGY | Facility: HOSPITAL | Age: 57
End: 2022-01-01

## 2022-01-01 ENCOUNTER — HOSPITAL ENCOUNTER (INPATIENT)
Facility: HOSPITAL | Age: 57
LOS: 13 days | End: 2022-01-15
Attending: EMERGENCY MEDICINE | Admitting: INTERNAL MEDICINE

## 2022-01-01 VITALS
HEIGHT: 69 IN | DIASTOLIC BLOOD PRESSURE: 37 MMHG | TEMPERATURE: 93.4 F | SYSTOLIC BLOOD PRESSURE: 129 MMHG | WEIGHT: 225.31 LBS | BODY MASS INDEX: 33.37 KG/M2

## 2022-01-01 DIAGNOSIS — J12.82 PNEUMONIA DUE TO COVID-19 VIRUS: Primary | ICD-10-CM

## 2022-01-01 DIAGNOSIS — N17.9 AKI (ACUTE KIDNEY INJURY): ICD-10-CM

## 2022-01-01 DIAGNOSIS — U07.1 PNEUMONIA DUE TO COVID-19 VIRUS: Primary | ICD-10-CM

## 2022-01-01 DIAGNOSIS — J96.01 ACUTE RESPIRATORY FAILURE WITH HYPOXIA: ICD-10-CM

## 2022-01-01 LAB
25(OH)D3 SERPL-MCNC: 50.8 NG/ML (ref 30–100)
ALBUMIN SERPL-MCNC: 2.4 G/DL (ref 3.5–5.2)
ALBUMIN SERPL-MCNC: 2.5 G/DL (ref 3.5–5.2)
ALBUMIN SERPL-MCNC: 2.6 G/DL (ref 3.5–5.2)
ALBUMIN SERPL-MCNC: 2.7 G/DL (ref 3.5–5.2)
ALBUMIN SERPL-MCNC: 2.8 G/DL (ref 3.5–5.2)
ALBUMIN SERPL-MCNC: 2.9 G/DL (ref 3.5–5.2)
ALBUMIN SERPL-MCNC: 3 G/DL (ref 3.5–5.2)
ALBUMIN SERPL-MCNC: 3.1 G/DL (ref 3.5–5.2)
ALBUMIN SERPL-MCNC: 3.2 G/DL (ref 3.5–5.2)
ALBUMIN SERPL-MCNC: 3.5 G/DL (ref 3.5–5.2)
ALBUMIN SERPL-MCNC: 3.5 G/DL (ref 3.5–5.2)
ALBUMIN/GLOB SERPL: 0.7 G/DL
ALBUMIN/GLOB SERPL: 0.8 G/DL
ALBUMIN/GLOB SERPL: 0.9 G/DL
ALBUMIN/GLOB SERPL: 1 G/DL
ALBUMIN/GLOB SERPL: 1.3 G/DL
ALP SERPL-CCNC: 59 U/L (ref 39–117)
ALP SERPL-CCNC: 59 U/L (ref 39–117)
ALP SERPL-CCNC: 62 U/L (ref 39–117)
ALP SERPL-CCNC: 64 U/L (ref 39–117)
ALP SERPL-CCNC: 68 U/L (ref 39–117)
ALP SERPL-CCNC: 70 U/L (ref 39–117)
ALP SERPL-CCNC: 72 U/L (ref 39–117)
ALP SERPL-CCNC: 73 U/L (ref 39–117)
ALP SERPL-CCNC: 74 U/L (ref 39–117)
ALP SERPL-CCNC: 76 U/L (ref 39–117)
ALP SERPL-CCNC: 78 U/L (ref 39–117)
ALP SERPL-CCNC: 82 U/L (ref 39–117)
ALP SERPL-CCNC: 88 U/L (ref 39–117)
ALP SERPL-CCNC: 90 U/L (ref 39–117)
ALP SERPL-CCNC: 91 U/L (ref 39–117)
ALT SERPL W P-5'-P-CCNC: 35 U/L (ref 1–41)
ALT SERPL W P-5'-P-CCNC: 36 U/L (ref 1–41)
ALT SERPL W P-5'-P-CCNC: 38 U/L (ref 1–41)
ALT SERPL W P-5'-P-CCNC: 39 U/L (ref 1–41)
ALT SERPL W P-5'-P-CCNC: 40 U/L (ref 1–41)
ALT SERPL W P-5'-P-CCNC: 42 U/L (ref 1–41)
ALT SERPL W P-5'-P-CCNC: 44 U/L (ref 1–41)
ALT SERPL W P-5'-P-CCNC: 45 U/L (ref 1–41)
ALT SERPL W P-5'-P-CCNC: 45 U/L (ref 1–41)
ALT SERPL W P-5'-P-CCNC: 46 U/L (ref 1–41)
ALT SERPL W P-5'-P-CCNC: 46 U/L (ref 1–41)
ALT SERPL W P-5'-P-CCNC: 47 U/L (ref 1–41)
ALT SERPL W P-5'-P-CCNC: 47 U/L (ref 1–41)
ALT SERPL W P-5'-P-CCNC: 53 U/L (ref 1–41)
ALT SERPL W P-5'-P-CCNC: 58 U/L (ref 1–41)
ANION GAP SERPL CALCULATED.3IONS-SCNC: 10.2 MMOL/L (ref 5–15)
ANION GAP SERPL CALCULATED.3IONS-SCNC: 10.2 MMOL/L (ref 5–15)
ANION GAP SERPL CALCULATED.3IONS-SCNC: 10.5 MMOL/L (ref 5–15)
ANION GAP SERPL CALCULATED.3IONS-SCNC: 11.1 MMOL/L (ref 5–15)
ANION GAP SERPL CALCULATED.3IONS-SCNC: 11.5 MMOL/L (ref 5–15)
ANION GAP SERPL CALCULATED.3IONS-SCNC: 12 MMOL/L (ref 5–15)
ANION GAP SERPL CALCULATED.3IONS-SCNC: 12 MMOL/L (ref 5–15)
ANION GAP SERPL CALCULATED.3IONS-SCNC: 12.2 MMOL/L (ref 5–15)
ANION GAP SERPL CALCULATED.3IONS-SCNC: 12.5 MMOL/L (ref 5–15)
ANION GAP SERPL CALCULATED.3IONS-SCNC: 12.7 MMOL/L (ref 5–15)
ANION GAP SERPL CALCULATED.3IONS-SCNC: 12.8 MMOL/L (ref 5–15)
ANION GAP SERPL CALCULATED.3IONS-SCNC: 12.8 MMOL/L (ref 5–15)
ANION GAP SERPL CALCULATED.3IONS-SCNC: 13 MMOL/L (ref 5–15)
ANION GAP SERPL CALCULATED.3IONS-SCNC: 13.1 MMOL/L (ref 5–15)
ANION GAP SERPL CALCULATED.3IONS-SCNC: 13.2 MMOL/L (ref 5–15)
ANION GAP SERPL CALCULATED.3IONS-SCNC: 13.4 MMOL/L (ref 5–15)
ANION GAP SERPL CALCULATED.3IONS-SCNC: 13.4 MMOL/L (ref 5–15)
ANION GAP SERPL CALCULATED.3IONS-SCNC: 14.7 MMOL/L (ref 5–15)
ANION GAP SERPL CALCULATED.3IONS-SCNC: 15.7 MMOL/L (ref 5–15)
ANION GAP SERPL CALCULATED.3IONS-SCNC: 16 MMOL/L (ref 5–15)
ANION GAP SERPL CALCULATED.3IONS-SCNC: 20.2 MMOL/L (ref 5–15)
ANISOCYTOSIS BLD QL: ABNORMAL
APTT PPP: 119.7 SECONDS (ref 22.7–35.4)
APTT PPP: 126.4 SECONDS (ref 22.7–35.4)
APTT PPP: 37 SECONDS (ref 22.7–35.4)
APTT PPP: 53.1 SECONDS (ref 22.7–35.4)
APTT PPP: 87.3 SECONDS (ref 22.7–35.4)
ARTERIAL PATENCY WRIST A: ABNORMAL
AST SERPL-CCNC: 26 U/L (ref 1–40)
AST SERPL-CCNC: 29 U/L (ref 1–40)
AST SERPL-CCNC: 31 U/L (ref 1–40)
AST SERPL-CCNC: 32 U/L (ref 1–40)
AST SERPL-CCNC: 34 U/L (ref 1–40)
AST SERPL-CCNC: 36 U/L (ref 1–40)
AST SERPL-CCNC: 38 U/L (ref 1–40)
AST SERPL-CCNC: 38 U/L (ref 1–40)
AST SERPL-CCNC: 44 U/L (ref 1–40)
AST SERPL-CCNC: 46 U/L (ref 1–40)
AST SERPL-CCNC: 47 U/L (ref 1–40)
AST SERPL-CCNC: 50 U/L (ref 1–40)
AST SERPL-CCNC: 54 U/L (ref 1–40)
AST SERPL-CCNC: 69 U/L (ref 1–40)
AST SERPL-CCNC: 72 U/L (ref 1–40)
ATMOSPHERIC PRESS: 751.2 MMHG
BACTERIA SPEC AEROBE CULT: NORMAL
BACTERIA SPEC RESP CULT: NORMAL
BASE EXCESS BLDA CALC-SCNC: -2 MMOL/L (ref 0–2)
BASOPHILS # BLD AUTO: 0 10*3/MM3 (ref 0–0.2)
BASOPHILS # BLD AUTO: 0.01 10*3/MM3 (ref 0–0.2)
BASOPHILS # BLD AUTO: 0.02 10*3/MM3 (ref 0–0.2)
BASOPHILS # BLD AUTO: 0.04 10*3/MM3 (ref 0–0.2)
BASOPHILS NFR BLD AUTO: 0 % (ref 0–1.5)
BASOPHILS NFR BLD AUTO: 0.1 % (ref 0–1.5)
BASOPHILS NFR BLD AUTO: 0.2 % (ref 0–1.5)
BASOPHILS NFR BLD AUTO: 0.3 % (ref 0–1.5)
BDY SITE: ABNORMAL
BILIRUB CONJ SERPL-MCNC: 0.2 MG/DL (ref 0–0.3)
BILIRUB CONJ SERPL-MCNC: 0.3 MG/DL (ref 0–0.3)
BILIRUB INDIRECT SERPL-MCNC: 0.6 MG/DL
BILIRUB SERPL-MCNC: 0.4 MG/DL (ref 0–1.2)
BILIRUB SERPL-MCNC: 0.4 MG/DL (ref 0–1.2)
BILIRUB SERPL-MCNC: 0.6 MG/DL (ref 0–1.2)
BILIRUB SERPL-MCNC: 0.6 MG/DL (ref 0–1.2)
BILIRUB SERPL-MCNC: 0.7 MG/DL (ref 0–1.2)
BILIRUB SERPL-MCNC: 0.8 MG/DL (ref 0–1.2)
BILIRUB SERPL-MCNC: 0.9 MG/DL (ref 0–1.2)
BILIRUB SERPL-MCNC: 1 MG/DL (ref 0–1.2)
BILIRUB SERPL-MCNC: 1.1 MG/DL (ref 0–1.2)
BILIRUB SERPL-MCNC: 2.4 MG/DL (ref 0–1.2)
BUN SERPL-MCNC: 15 MG/DL (ref 6–20)
BUN SERPL-MCNC: 17 MG/DL (ref 6–20)
BUN SERPL-MCNC: 20 MG/DL (ref 6–20)
BUN SERPL-MCNC: 21 MG/DL (ref 6–20)
BUN SERPL-MCNC: 22 MG/DL (ref 6–20)
BUN SERPL-MCNC: 23 MG/DL (ref 6–20)
BUN SERPL-MCNC: 25 MG/DL (ref 6–20)
BUN SERPL-MCNC: 28 MG/DL (ref 6–20)
BUN SERPL-MCNC: 29 MG/DL (ref 6–20)
BUN SERPL-MCNC: 29 MG/DL (ref 6–20)
BUN SERPL-MCNC: 30 MG/DL (ref 6–20)
BUN SERPL-MCNC: 30 MG/DL (ref 6–20)
BUN SERPL-MCNC: 34 MG/DL (ref 6–20)
BUN SERPL-MCNC: 34 MG/DL (ref 6–20)
BUN SERPL-MCNC: 36 MG/DL (ref 6–20)
BUN SERPL-MCNC: 36 MG/DL (ref 6–20)
BUN SERPL-MCNC: 37 MG/DL (ref 6–20)
BUN SERPL-MCNC: 38 MG/DL (ref 6–20)
BUN SERPL-MCNC: 39 MG/DL (ref 6–20)
BUN SERPL-MCNC: 44 MG/DL (ref 6–20)
BUN SERPL-MCNC: 46 MG/DL (ref 6–20)
BUN SERPL-MCNC: 55 MG/DL (ref 6–20)
BUN SERPL-MCNC: 61 MG/DL (ref 6–20)
BUN/CREAT SERPL: 12.8 (ref 7–25)
BUN/CREAT SERPL: 14.4 (ref 7–25)
BUN/CREAT SERPL: 14.8 (ref 7–25)
BUN/CREAT SERPL: 15.1 (ref 7–25)
BUN/CREAT SERPL: 17.4 (ref 7–25)
BUN/CREAT SERPL: 17.7 (ref 7–25)
BUN/CREAT SERPL: 17.9 (ref 7–25)
BUN/CREAT SERPL: 18.4 (ref 7–25)
BUN/CREAT SERPL: 18.5 (ref 7–25)
BUN/CREAT SERPL: 18.7 (ref 7–25)
BUN/CREAT SERPL: 19.2 (ref 7–25)
BUN/CREAT SERPL: 20 (ref 7–25)
BUN/CREAT SERPL: 23.3 (ref 7–25)
BUN/CREAT SERPL: 25 (ref 7–25)
BUN/CREAT SERPL: 27.8 (ref 7–25)
BUN/CREAT SERPL: 28.9 (ref 7–25)
BUN/CREAT SERPL: 31.3 (ref 7–25)
BUN/CREAT SERPL: 33.7 (ref 7–25)
BUN/CREAT SERPL: 34.7 (ref 7–25)
BUN/CREAT SERPL: 36.6 (ref 7–25)
BUN/CREAT SERPL: 40.4 (ref 7–25)
BURR CELLS BLD QL SMEAR: ABNORMAL
CA-I BLD-MCNC: 4.1 MG/DL (ref 4.6–5.4)
CA-I BLD-MCNC: 4.2 MG/DL (ref 4.6–5.4)
CA-I BLD-MCNC: 4.3 MG/DL (ref 4.6–5.4)
CA-I BLD-MCNC: 4.4 MG/DL (ref 4.6–5.4)
CA-I SERPL ISE-MCNC: 1.02 MMOL/L (ref 1.15–1.35)
CA-I SERPL ISE-MCNC: 1.06 MMOL/L (ref 1.15–1.35)
CA-I SERPL ISE-MCNC: 1.07 MMOL/L (ref 1.15–1.35)
CA-I SERPL ISE-MCNC: 1.07 MMOL/L (ref 1.15–1.35)
CA-I SERPL ISE-MCNC: 1.08 MMOL/L (ref 1.15–1.35)
CA-I SERPL ISE-MCNC: 1.09 MMOL/L (ref 1.15–1.35)
CALCIUM SPEC-SCNC: 6.7 MG/DL (ref 8.6–10.5)
CALCIUM SPEC-SCNC: 7.1 MG/DL (ref 8.6–10.5)
CALCIUM SPEC-SCNC: 7.4 MG/DL (ref 8.6–10.5)
CALCIUM SPEC-SCNC: 7.4 MG/DL (ref 8.6–10.5)
CALCIUM SPEC-SCNC: 7.5 MG/DL (ref 8.6–10.5)
CALCIUM SPEC-SCNC: 7.6 MG/DL (ref 8.6–10.5)
CALCIUM SPEC-SCNC: 7.6 MG/DL (ref 8.6–10.5)
CALCIUM SPEC-SCNC: 7.7 MG/DL (ref 8.6–10.5)
CALCIUM SPEC-SCNC: 7.9 MG/DL (ref 8.6–10.5)
CALCIUM SPEC-SCNC: 8.1 MG/DL (ref 8.6–10.5)
CALCIUM SPEC-SCNC: 8.2 MG/DL (ref 8.6–10.5)
CALCIUM SPEC-SCNC: 8.4 MG/DL (ref 8.6–10.5)
CALCIUM SPEC-SCNC: 8.5 MG/DL (ref 8.6–10.5)
CALCIUM SPEC-SCNC: 8.6 MG/DL (ref 8.6–10.5)
CALCIUM SPEC-SCNC: 8.7 MG/DL (ref 8.6–10.5)
CALCIUM SPEC-SCNC: 8.7 MG/DL (ref 8.6–10.5)
CHLORIDE SERPL-SCNC: 100 MMOL/L (ref 98–107)
CHLORIDE SERPL-SCNC: 101 MMOL/L (ref 98–107)
CHLORIDE SERPL-SCNC: 101 MMOL/L (ref 98–107)
CHLORIDE SERPL-SCNC: 102 MMOL/L (ref 98–107)
CHLORIDE SERPL-SCNC: 103 MMOL/L (ref 98–107)
CHLORIDE SERPL-SCNC: 104 MMOL/L (ref 98–107)
CHLORIDE SERPL-SCNC: 105 MMOL/L (ref 98–107)
CHLORIDE SERPL-SCNC: 96 MMOL/L (ref 98–107)
CHLORIDE UR-SCNC: 30 MMOL/L
CHOLEST SERPL-MCNC: 157 MG/DL (ref 0–200)
CK SERPL-CCNC: 1229 U/L (ref 20–200)
CK SERPL-CCNC: 160 U/L (ref 20–200)
CK SERPL-CCNC: 230 U/L (ref 20–200)
CK SERPL-CCNC: 27 U/L (ref 20–200)
CK SERPL-CCNC: 29 U/L (ref 20–200)
CK SERPL-CCNC: 35 U/L (ref 20–200)
CK SERPL-CCNC: 350 U/L (ref 20–200)
CK SERPL-CCNC: 41 U/L (ref 20–200)
CK SERPL-CCNC: 50 U/L (ref 20–200)
CK SERPL-CCNC: 572 U/L (ref 20–200)
CK SERPL-CCNC: 64 U/L (ref 20–200)
CK SERPL-CCNC: 84 U/L (ref 20–200)
CK SERPL-CCNC: 844 U/L (ref 20–200)
CO2 SERPL-SCNC: 17 MMOL/L (ref 22–29)
CO2 SERPL-SCNC: 18.9 MMOL/L (ref 22–29)
CO2 SERPL-SCNC: 19.2 MMOL/L (ref 22–29)
CO2 SERPL-SCNC: 19.3 MMOL/L (ref 22–29)
CO2 SERPL-SCNC: 19.3 MMOL/L (ref 22–29)
CO2 SERPL-SCNC: 19.5 MMOL/L (ref 22–29)
CO2 SERPL-SCNC: 19.8 MMOL/L (ref 22–29)
CO2 SERPL-SCNC: 19.8 MMOL/L (ref 22–29)
CO2 SERPL-SCNC: 19.9 MMOL/L (ref 22–29)
CO2 SERPL-SCNC: 19.9 MMOL/L (ref 22–29)
CO2 SERPL-SCNC: 21 MMOL/L (ref 22–29)
CO2 SERPL-SCNC: 21 MMOL/L (ref 22–29)
CO2 SERPL-SCNC: 21.3 MMOL/L (ref 22–29)
CO2 SERPL-SCNC: 21.5 MMOL/L (ref 22–29)
CO2 SERPL-SCNC: 21.6 MMOL/L (ref 22–29)
CO2 SERPL-SCNC: 21.6 MMOL/L (ref 22–29)
CO2 SERPL-SCNC: 21.8 MMOL/L (ref 22–29)
CO2 SERPL-SCNC: 21.8 MMOL/L (ref 22–29)
CO2 SERPL-SCNC: 21.9 MMOL/L (ref 22–29)
CO2 SERPL-SCNC: 22 MMOL/L (ref 22–29)
CO2 SERPL-SCNC: 22.2 MMOL/L (ref 22–29)
CO2 SERPL-SCNC: 22.5 MMOL/L (ref 22–29)
CO2 SERPL-SCNC: 23.8 MMOL/L (ref 22–29)
CREAT SERPL-MCNC: 0.72 MG/DL (ref 0.76–1.27)
CREAT SERPL-MCNC: 0.76 MG/DL (ref 0.76–1.27)
CREAT SERPL-MCNC: 0.8 MG/DL (ref 0.76–1.27)
CREAT SERPL-MCNC: 0.89 MG/DL (ref 0.76–1.27)
CREAT SERPL-MCNC: 0.89 MG/DL (ref 0.76–1.27)
CREAT SERPL-MCNC: 0.9 MG/DL (ref 0.76–1.27)
CREAT SERPL-MCNC: 0.92 MG/DL (ref 0.76–1.27)
CREAT SERPL-MCNC: 0.93 MG/DL (ref 0.76–1.27)
CREAT SERPL-MCNC: 0.98 MG/DL (ref 0.76–1.27)
CREAT SERPL-MCNC: 1.17 MG/DL (ref 0.76–1.27)
CREAT SERPL-MCNC: 1.18 MG/DL (ref 0.76–1.27)
CREAT SERPL-MCNC: 1.46 MG/DL (ref 0.76–1.27)
CREAT SERPL-MCNC: 1.62 MG/DL (ref 0.76–1.27)
CREAT SERPL-MCNC: 1.62 MG/DL (ref 0.76–1.27)
CREAT SERPL-MCNC: 1.63 MG/DL (ref 0.76–1.27)
CREAT SERPL-MCNC: 1.98 MG/DL (ref 0.76–1.27)
CREAT SERPL-MCNC: 2.11 MG/DL (ref 0.76–1.27)
CREAT SERPL-MCNC: 2.15 MG/DL (ref 0.76–1.27)
CREAT SERPL-MCNC: 2.44 MG/DL (ref 0.76–1.27)
CREAT SERPL-MCNC: 2.46 MG/DL (ref 0.76–1.27)
CREAT SERPL-MCNC: 3.05 MG/DL (ref 0.76–1.27)
CREAT SERPL-MCNC: 3.05 MG/DL (ref 0.76–1.27)
CREAT SERPL-MCNC: 3.16 MG/DL (ref 0.76–1.27)
CREAT UR-MCNC: 178.9 MG/DL
CRP SERPL-MCNC: 12.47 MG/DL (ref 0–0.5)
CRP SERPL-MCNC: 12.78 MG/DL (ref 0–0.5)
CRP SERPL-MCNC: 15.4 MG/DL (ref 0–0.5)
CRP SERPL-MCNC: 21.48 MG/DL (ref 0–0.5)
CRP SERPL-MCNC: 25.13 MG/DL (ref 0–0.5)
CRP SERPL-MCNC: 26.49 MG/DL (ref 0–0.5)
CRP SERPL-MCNC: 4.02 MG/DL (ref 0–0.5)
CRP SERPL-MCNC: 5.24 MG/DL (ref 0–0.5)
CRP SERPL-MCNC: 5.83 MG/DL (ref 0–0.5)
CRP SERPL-MCNC: 7.35 MG/DL (ref 0–0.5)
CRP SERPL-MCNC: 8.13 MG/DL (ref 0–0.5)
CRP SERPL-MCNC: 8.14 MG/DL (ref 0–0.5)
CRP SERPL-MCNC: 8.52 MG/DL (ref 0–0.5)
CRP SERPL-MCNC: 9.14 MG/DL (ref 0–0.5)
D DIMER PPP FEU-MCNC: 1.59 MCGFEU/ML (ref 0–0.49)
D DIMER PPP FEU-MCNC: 12.26 MCGFEU/ML (ref 0–0.49)
D DIMER PPP FEU-MCNC: 2.26 MCGFEU/ML (ref 0–0.49)
D DIMER PPP FEU-MCNC: 2.5 MCGFEU/ML (ref 0–0.49)
D DIMER PPP FEU-MCNC: 3.2 MCGFEU/ML (ref 0–0.49)
D DIMER PPP FEU-MCNC: 3.6 MCGFEU/ML (ref 0–0.49)
D DIMER PPP FEU-MCNC: 5.28 MCGFEU/ML (ref 0–0.49)
D DIMER PPP FEU-MCNC: >20 MCGFEU/ML (ref 0–0.49)
D-LACTATE SERPL-SCNC: 1.6 MMOL/L (ref 0.5–2)
DACRYOCYTES BLD QL SMEAR: ABNORMAL
DEPRECATED RDW RBC AUTO: 37.8 FL (ref 37–54)
DEPRECATED RDW RBC AUTO: 38.5 FL (ref 37–54)
DEPRECATED RDW RBC AUTO: 39 FL (ref 37–54)
DEPRECATED RDW RBC AUTO: 39.1 FL (ref 37–54)
DEPRECATED RDW RBC AUTO: 40.4 FL (ref 37–54)
DEPRECATED RDW RBC AUTO: 40.5 FL (ref 37–54)
DEPRECATED RDW RBC AUTO: 40.5 FL (ref 37–54)
DEPRECATED RDW RBC AUTO: 40.7 FL (ref 37–54)
DEPRECATED RDW RBC AUTO: 40.8 FL (ref 37–54)
DEPRECATED RDW RBC AUTO: 40.9 FL (ref 37–54)
DEPRECATED RDW RBC AUTO: 41 FL (ref 37–54)
DEPRECATED RDW RBC AUTO: 42.1 FL (ref 37–54)
DEPRECATED RDW RBC AUTO: 42.8 FL (ref 37–54)
DEPRECATED RDW RBC AUTO: 44.9 FL (ref 37–54)
EOSINOPHIL # BLD AUTO: 0 10*3/MM3 (ref 0–0.4)
EOSINOPHIL # BLD AUTO: 0.02 10*3/MM3 (ref 0–0.4)
EOSINOPHIL # BLD AUTO: 0.02 10*3/MM3 (ref 0–0.4)
EOSINOPHIL # BLD AUTO: 0.03 10*3/MM3 (ref 0–0.4)
EOSINOPHIL # BLD MANUAL: 0.14 10*3/MM3 (ref 0–0.4)
EOSINOPHIL # BLD MANUAL: 0.17 10*3/MM3 (ref 0–0.4)
EOSINOPHIL # BLD MANUAL: 0.31 10*3/MM3 (ref 0–0.4)
EOSINOPHIL NFR BLD AUTO: 0 % (ref 0.3–6.2)
EOSINOPHIL NFR BLD AUTO: 0.2 % (ref 0.3–6.2)
EOSINOPHIL NFR BLD AUTO: 0.2 % (ref 0.3–6.2)
EOSINOPHIL NFR BLD AUTO: 0.3 % (ref 0.3–6.2)
EOSINOPHIL NFR BLD MANUAL: 1 % (ref 0.3–6.2)
ERYTHROCYTE [DISTWIDTH] IN BLOOD BY AUTOMATED COUNT: 12.8 % (ref 12.3–15.4)
ERYTHROCYTE [DISTWIDTH] IN BLOOD BY AUTOMATED COUNT: 12.9 % (ref 12.3–15.4)
ERYTHROCYTE [DISTWIDTH] IN BLOOD BY AUTOMATED COUNT: 13.1 % (ref 12.3–15.4)
ERYTHROCYTE [DISTWIDTH] IN BLOOD BY AUTOMATED COUNT: 13.2 % (ref 12.3–15.4)
ERYTHROCYTE [DISTWIDTH] IN BLOOD BY AUTOMATED COUNT: 13.2 % (ref 12.3–15.4)
ERYTHROCYTE [DISTWIDTH] IN BLOOD BY AUTOMATED COUNT: 13.5 % (ref 12.3–15.4)
ERYTHROCYTE [DISTWIDTH] IN BLOOD BY AUTOMATED COUNT: 13.6 % (ref 12.3–15.4)
ERYTHROCYTE [DISTWIDTH] IN BLOOD BY AUTOMATED COUNT: 13.7 % (ref 12.3–15.4)
ERYTHROCYTE [DISTWIDTH] IN BLOOD BY AUTOMATED COUNT: 13.9 % (ref 12.3–15.4)
FERRITIN SERPL-MCNC: 1697 NG/ML (ref 30–400)
FERRITIN SERPL-MCNC: 1778 NG/ML (ref 30–400)
FERRITIN SERPL-MCNC: 1802 NG/ML (ref 30–400)
FERRITIN SERPL-MCNC: 2391 NG/ML (ref 30–400)
FERRITIN SERPL-MCNC: 2938 NG/ML (ref 30–400)
FERRITIN SERPL-MCNC: 3565 NG/ML (ref 30–400)
FERRITIN SERPL-MCNC: 3621 NG/ML (ref 30–400)
FERRITIN SERPL-MCNC: 3805 NG/ML (ref 30–400)
FERRITIN SERPL-MCNC: 3833 NG/ML (ref 30–400)
FERRITIN SERPL-MCNC: 3918 NG/ML (ref 30–400)
FERRITIN SERPL-MCNC: 3970 NG/ML (ref 30–400)
FERRITIN SERPL-MCNC: 4419 NG/ML (ref 30–400)
FERRITIN SERPL-MCNC: 4866 NG/ML (ref 30–400)
FIBRINOGEN PPP-MCNC: 515 MG/DL (ref 219–464)
FIBRINOGEN PPP-MCNC: 585 MG/DL (ref 219–464)
FIBRINOGEN PPP-MCNC: 639 MG/DL (ref 219–464)
FIBRINOGEN PPP-MCNC: 653 MG/DL (ref 219–464)
FIBRINOGEN PPP-MCNC: 738 MG/DL (ref 219–464)
FIBRINOGEN PPP-MCNC: 740 MG/DL (ref 219–464)
FIBRINOGEN PPP-MCNC: 960 MG/DL (ref 219–464)
GFR SERPL CREATININE-BSD FRML MDRD: 100 ML/MIN/1.73
GFR SERPL CREATININE-BSD FRML MDRD: 106 ML/MIN/1.73
GFR SERPL CREATININE-BSD FRML MDRD: 113 ML/MIN/1.73
GFR SERPL CREATININE-BSD FRML MDRD: 20 ML/MIN/1.73
GFR SERPL CREATININE-BSD FRML MDRD: 21 ML/MIN/1.73
GFR SERPL CREATININE-BSD FRML MDRD: 21 ML/MIN/1.73
GFR SERPL CREATININE-BSD FRML MDRD: 27 ML/MIN/1.73
GFR SERPL CREATININE-BSD FRML MDRD: 27 ML/MIN/1.73
GFR SERPL CREATININE-BSD FRML MDRD: 32 ML/MIN/1.73
GFR SERPL CREATININE-BSD FRML MDRD: 32 ML/MIN/1.73
GFR SERPL CREATININE-BSD FRML MDRD: 35 ML/MIN/1.73
GFR SERPL CREATININE-BSD FRML MDRD: 44 ML/MIN/1.73
GFR SERPL CREATININE-BSD FRML MDRD: 50 ML/MIN/1.73
GFR SERPL CREATININE-BSD FRML MDRD: 64 ML/MIN/1.73
GFR SERPL CREATININE-BSD FRML MDRD: 64 ML/MIN/1.73
GFR SERPL CREATININE-BSD FRML MDRD: 79 ML/MIN/1.73
GFR SERPL CREATININE-BSD FRML MDRD: 84 ML/MIN/1.73
GFR SERPL CREATININE-BSD FRML MDRD: 85 ML/MIN/1.73
GFR SERPL CREATININE-BSD FRML MDRD: 87 ML/MIN/1.73
GFR SERPL CREATININE-BSD FRML MDRD: 88 ML/MIN/1.73
GFR SERPL CREATININE-BSD FRML MDRD: 88 ML/MIN/1.73
GLOBULIN UR ELPH-MCNC: 2.7 GM/DL
GLOBULIN UR ELPH-MCNC: 3 GM/DL
GLOBULIN UR ELPH-MCNC: 3.2 GM/DL
GLOBULIN UR ELPH-MCNC: 3.3 GM/DL
GLOBULIN UR ELPH-MCNC: 3.4 GM/DL
GLOBULIN UR ELPH-MCNC: 3.4 GM/DL
GLOBULIN UR ELPH-MCNC: 3.5 GM/DL
GLOBULIN UR ELPH-MCNC: 3.6 GM/DL
GLOBULIN UR ELPH-MCNC: 3.7 GM/DL
GLOBULIN UR ELPH-MCNC: 3.7 GM/DL
GLOBULIN UR ELPH-MCNC: 3.8 GM/DL
GLOBULIN UR ELPH-MCNC: 3.8 GM/DL
GLOBULIN UR ELPH-MCNC: 3.9 GM/DL
GLOBULIN UR ELPH-MCNC: 4 GM/DL
GLUCOSE BLDC GLUCOMTR-MCNC: 101 MG/DL (ref 70–130)
GLUCOSE BLDC GLUCOMTR-MCNC: 113 MG/DL (ref 70–130)
GLUCOSE BLDC GLUCOMTR-MCNC: 116 MG/DL (ref 70–130)
GLUCOSE BLDC GLUCOMTR-MCNC: 119 MG/DL (ref 70–130)
GLUCOSE BLDC GLUCOMTR-MCNC: 120 MG/DL (ref 70–130)
GLUCOSE BLDC GLUCOMTR-MCNC: 122 MG/DL (ref 70–130)
GLUCOSE BLDC GLUCOMTR-MCNC: 122 MG/DL (ref 70–130)
GLUCOSE BLDC GLUCOMTR-MCNC: 124 MG/DL (ref 70–130)
GLUCOSE BLDC GLUCOMTR-MCNC: 125 MG/DL (ref 70–130)
GLUCOSE BLDC GLUCOMTR-MCNC: 143 MG/DL (ref 70–130)
GLUCOSE BLDC GLUCOMTR-MCNC: 150 MG/DL (ref 70–130)
GLUCOSE SERPL-MCNC: 103 MG/DL (ref 65–99)
GLUCOSE SERPL-MCNC: 104 MG/DL (ref 65–99)
GLUCOSE SERPL-MCNC: 105 MG/DL (ref 65–99)
GLUCOSE SERPL-MCNC: 106 MG/DL (ref 65–99)
GLUCOSE SERPL-MCNC: 106 MG/DL (ref 65–99)
GLUCOSE SERPL-MCNC: 115 MG/DL (ref 65–99)
GLUCOSE SERPL-MCNC: 120 MG/DL (ref 65–99)
GLUCOSE SERPL-MCNC: 126 MG/DL (ref 65–99)
GLUCOSE SERPL-MCNC: 128 MG/DL (ref 65–99)
GLUCOSE SERPL-MCNC: 133 MG/DL (ref 65–99)
GLUCOSE SERPL-MCNC: 137 MG/DL (ref 65–99)
GLUCOSE SERPL-MCNC: 140 MG/DL (ref 65–99)
GLUCOSE SERPL-MCNC: 141 MG/DL (ref 65–99)
GLUCOSE SERPL-MCNC: 142 MG/DL (ref 65–99)
GLUCOSE SERPL-MCNC: 143 MG/DL (ref 65–99)
GLUCOSE SERPL-MCNC: 147 MG/DL (ref 65–99)
GLUCOSE SERPL-MCNC: 153 MG/DL (ref 65–99)
GLUCOSE SERPL-MCNC: 175 MG/DL (ref 65–99)
GLUCOSE SERPL-MCNC: 198 MG/DL (ref 65–99)
GLUCOSE SERPL-MCNC: 220 MG/DL (ref 65–99)
GLUCOSE SERPL-MCNC: 220 MG/DL (ref 65–99)
GLUCOSE SERPL-MCNC: 239 MG/DL (ref 65–99)
GLUCOSE SERPL-MCNC: 96 MG/DL (ref 65–99)
GRAM STN SPEC: NORMAL
HCO3 BLDA-SCNC: 24.3 MMOL/L (ref 22–28)
HCT VFR BLD AUTO: 35 % (ref 37.5–51)
HCT VFR BLD AUTO: 35.2 % (ref 37.5–51)
HCT VFR BLD AUTO: 35.5 % (ref 37.5–51)
HCT VFR BLD AUTO: 36.4 % (ref 37.5–51)
HCT VFR BLD AUTO: 37.5 % (ref 37.5–51)
HCT VFR BLD AUTO: 37.5 % (ref 37.5–51)
HCT VFR BLD AUTO: 37.6 % (ref 37.5–51)
HCT VFR BLD AUTO: 38.3 % (ref 37.5–51)
HCT VFR BLD AUTO: 38.5 % (ref 37.5–51)
HCT VFR BLD AUTO: 40.9 % (ref 37.5–51)
HCT VFR BLD AUTO: 41.5 % (ref 37.5–51)
HCT VFR BLD AUTO: 41.6 % (ref 37.5–51)
HCT VFR BLD AUTO: 41.9 % (ref 37.5–51)
HCT VFR BLD AUTO: 42 % (ref 37.5–51)
HDLC SERPL-MCNC: 26 MG/DL (ref 40–60)
HGB BLD-MCNC: 11.7 G/DL (ref 13–17.7)
HGB BLD-MCNC: 11.9 G/DL (ref 13–17.7)
HGB BLD-MCNC: 11.9 G/DL (ref 13–17.7)
HGB BLD-MCNC: 12.1 G/DL (ref 13–17.7)
HGB BLD-MCNC: 12.2 G/DL (ref 13–17.7)
HGB BLD-MCNC: 12.2 G/DL (ref 13–17.7)
HGB BLD-MCNC: 12.7 G/DL (ref 13–17.7)
HGB BLD-MCNC: 12.7 G/DL (ref 13–17.7)
HGB BLD-MCNC: 13.1 G/DL (ref 13–17.7)
HGB BLD-MCNC: 13.1 G/DL (ref 13–17.7)
HGB BLD-MCNC: 13.3 G/DL (ref 13–17.7)
HGB BLD-MCNC: 13.6 G/DL (ref 13–17.7)
HGB BLD-MCNC: 13.7 G/DL (ref 13–17.7)
HGB BLD-MCNC: 14.2 G/DL (ref 13–17.7)
HOLD SPECIMEN: NORMAL
IMM GRANULOCYTES # BLD AUTO: 0.15 10*3/MM3 (ref 0–0.05)
IMM GRANULOCYTES # BLD AUTO: 0.15 10*3/MM3 (ref 0–0.05)
IMM GRANULOCYTES # BLD AUTO: 0.18 10*3/MM3 (ref 0–0.05)
IMM GRANULOCYTES # BLD AUTO: 0.19 10*3/MM3 (ref 0–0.05)
IMM GRANULOCYTES # BLD AUTO: 0.19 10*3/MM3 (ref 0–0.05)
IMM GRANULOCYTES # BLD AUTO: 0.36 10*3/MM3 (ref 0–0.05)
IMM GRANULOCYTES # BLD AUTO: 0.55 10*3/MM3 (ref 0–0.05)
IMM GRANULOCYTES NFR BLD AUTO: 1.4 % (ref 0–0.5)
IMM GRANULOCYTES NFR BLD AUTO: 1.7 % (ref 0–0.5)
IMM GRANULOCYTES NFR BLD AUTO: 1.7 % (ref 0–0.5)
IMM GRANULOCYTES NFR BLD AUTO: 1.8 % (ref 0–0.5)
IMM GRANULOCYTES NFR BLD AUTO: 1.8 % (ref 0–0.5)
IMM GRANULOCYTES NFR BLD AUTO: 3.2 % (ref 0–0.5)
IMM GRANULOCYTES NFR BLD AUTO: 4.7 % (ref 0–0.5)
INHALED O2 CONCENTRATION: 100 %
INR PPP: 1.44 (ref 0.9–1.1)
LDH SERPL-CCNC: 575 U/L (ref 135–225)
LDH SERPL-CCNC: 585 U/L (ref 135–225)
LDH SERPL-CCNC: 614 U/L (ref 135–225)
LDH SERPL-CCNC: 625 U/L (ref 135–225)
LDH SERPL-CCNC: 665 U/L (ref 135–225)
LDH SERPL-CCNC: 719 U/L (ref 135–225)
LDH SERPL-CCNC: 849 U/L (ref 135–225)
LDLC SERPL CALC-MCNC: 106 MG/DL (ref 0–100)
LDLC/HDLC SERPL: 3.96 {RATIO}
LYMPHOCYTES # BLD AUTO: 0.45 10*3/MM3 (ref 0.7–3.1)
LYMPHOCYTES # BLD AUTO: 0.47 10*3/MM3 (ref 0.7–3.1)
LYMPHOCYTES # BLD AUTO: 0.52 10*3/MM3 (ref 0.7–3.1)
LYMPHOCYTES # BLD AUTO: 0.56 10*3/MM3 (ref 0.7–3.1)
LYMPHOCYTES # BLD AUTO: 0.62 10*3/MM3 (ref 0.7–3.1)
LYMPHOCYTES # BLD AUTO: 0.64 10*3/MM3 (ref 0.7–3.1)
LYMPHOCYTES # BLD AUTO: 0.65 10*3/MM3 (ref 0.7–3.1)
LYMPHOCYTES # BLD MANUAL: 0.24 10*3/MM3 (ref 0.7–3.1)
LYMPHOCYTES # BLD MANUAL: 0.33 10*3/MM3 (ref 0.7–3.1)
LYMPHOCYTES # BLD MANUAL: 0.5 10*3/MM3 (ref 0.7–3.1)
LYMPHOCYTES # BLD MANUAL: 0.62 10*3/MM3 (ref 0.7–3.1)
LYMPHOCYTES # BLD MANUAL: 0.92 10*3/MM3 (ref 0.7–3.1)
LYMPHOCYTES # BLD MANUAL: 0.99 10*3/MM3 (ref 0.7–3.1)
LYMPHOCYTES # BLD MANUAL: 1.35 10*3/MM3 (ref 0.7–3.1)
LYMPHOCYTES NFR BLD AUTO: 4.3 % (ref 19.6–45.3)
LYMPHOCYTES NFR BLD AUTO: 4.7 % (ref 19.6–45.3)
LYMPHOCYTES NFR BLD AUTO: 4.7 % (ref 19.6–45.3)
LYMPHOCYTES NFR BLD AUTO: 5.5 % (ref 19.6–45.3)
LYMPHOCYTES NFR BLD AUTO: 5.8 % (ref 19.6–45.3)
LYMPHOCYTES NFR BLD AUTO: 6.1 % (ref 19.6–45.3)
LYMPHOCYTES NFR BLD AUTO: 6.2 % (ref 19.6–45.3)
LYMPHOCYTES NFR BLD MANUAL: 1 % (ref 5–12)
LYMPHOCYTES NFR BLD MANUAL: 3 % (ref 5–12)
LYMPHOCYTES NFR BLD MANUAL: 3 % (ref 5–12)
LYMPHOCYTES NFR BLD MANUAL: 4 % (ref 5–12)
LYMPHOCYTES NFR BLD MANUAL: 5.3 % (ref 5–12)
LYMPHOCYTES NFR BLD MANUAL: 6 % (ref 5–12)
LYMPHOCYTES NFR BLD MANUAL: 8.1 % (ref 5–12)
MAGNESIUM SERPL-MCNC: 2.2 MG/DL (ref 1.6–2.6)
MAGNESIUM SERPL-MCNC: 2.4 MG/DL (ref 1.6–2.6)
MAGNESIUM SERPL-MCNC: 2.5 MG/DL (ref 1.6–2.6)
MAGNESIUM SERPL-MCNC: 2.5 MG/DL (ref 1.6–2.6)
MAGNESIUM SERPL-MCNC: 2.6 MG/DL (ref 1.6–2.6)
MAGNESIUM SERPL-MCNC: 2.7 MG/DL (ref 1.6–2.6)
MAGNESIUM SERPL-MCNC: 2.9 MG/DL (ref 1.6–2.6)
MCH RBC QN AUTO: 27.5 PG (ref 26.6–33)
MCH RBC QN AUTO: 27.7 PG (ref 26.6–33)
MCH RBC QN AUTO: 27.8 PG (ref 26.6–33)
MCH RBC QN AUTO: 27.9 PG (ref 26.6–33)
MCH RBC QN AUTO: 28 PG (ref 26.6–33)
MCH RBC QN AUTO: 28.1 PG (ref 26.6–33)
MCH RBC QN AUTO: 28.2 PG (ref 26.6–33)
MCHC RBC AUTO-ENTMCNC: 31.5 G/DL (ref 31.5–35.7)
MCHC RBC AUTO-ENTMCNC: 31.7 G/DL (ref 31.5–35.7)
MCHC RBC AUTO-ENTMCNC: 32.3 G/DL (ref 31.5–35.7)
MCHC RBC AUTO-ENTMCNC: 32.5 G/DL (ref 31.5–35.7)
MCHC RBC AUTO-ENTMCNC: 32.6 G/DL (ref 31.5–35.7)
MCHC RBC AUTO-ENTMCNC: 32.8 G/DL (ref 31.5–35.7)
MCHC RBC AUTO-ENTMCNC: 33.2 G/DL (ref 31.5–35.7)
MCHC RBC AUTO-ENTMCNC: 33.2 G/DL (ref 31.5–35.7)
MCHC RBC AUTO-ENTMCNC: 33.5 G/DL (ref 31.5–35.7)
MCHC RBC AUTO-ENTMCNC: 33.5 G/DL (ref 31.5–35.7)
MCHC RBC AUTO-ENTMCNC: 33.8 G/DL (ref 31.5–35.7)
MCHC RBC AUTO-ENTMCNC: 34 G/DL (ref 31.5–35.7)
MCHC RBC AUTO-ENTMCNC: 34 G/DL (ref 31.5–35.7)
MCHC RBC AUTO-ENTMCNC: 34.7 G/DL (ref 31.5–35.7)
MCV RBC AUTO: 81 FL (ref 79–97)
MCV RBC AUTO: 81.3 FL (ref 79–97)
MCV RBC AUTO: 81.6 FL (ref 79–97)
MCV RBC AUTO: 82.3 FL (ref 79–97)
MCV RBC AUTO: 83.1 FL (ref 79–97)
MCV RBC AUTO: 83.3 FL (ref 79–97)
MCV RBC AUTO: 83.4 FL (ref 79–97)
MCV RBC AUTO: 84.6 FL (ref 79–97)
MCV RBC AUTO: 84.9 FL (ref 79–97)
MCV RBC AUTO: 85 FL (ref 79–97)
MCV RBC AUTO: 85.2 FL (ref 79–97)
MCV RBC AUTO: 86.4 FL (ref 79–97)
MCV RBC AUTO: 86.7 FL (ref 79–97)
MCV RBC AUTO: 87.9 FL (ref 79–97)
METAMYELOCYTES NFR BLD MANUAL: 3 % (ref 0–0)
METAMYELOCYTES NFR BLD MANUAL: 4 % (ref 0–0)
MICROCYTES BLD QL: ABNORMAL
MODALITY: ABNORMAL
MONOCYTES # BLD AUTO: 0.19 10*3/MM3 (ref 0.1–0.9)
MONOCYTES # BLD AUTO: 0.22 10*3/MM3 (ref 0.1–0.9)
MONOCYTES # BLD AUTO: 0.36 10*3/MM3 (ref 0.1–0.9)
MONOCYTES # BLD AUTO: 0.37 10*3/MM3 (ref 0.1–0.9)
MONOCYTES # BLD AUTO: 0.41 10*3/MM3 (ref 0.1–0.9)
MONOCYTES # BLD AUTO: 0.43 10*3/MM3 (ref 0.1–0.9)
MONOCYTES # BLD AUTO: 0.44 10*3/MM3 (ref 0.1–0.9)
MONOCYTES # BLD: 0.18 10*3/MM3 (ref 0.1–0.9)
MONOCYTES # BLD: 0.36 10*3/MM3 (ref 0.1–0.9)
MONOCYTES # BLD: 0.57 10*3/MM3 (ref 0.1–0.9)
MONOCYTES # BLD: 1.01 10*3/MM3 (ref 0.1–0.9)
MONOCYTES # BLD: 1.35 10*3/MM3 (ref 0.1–0.9)
MONOCYTES # BLD: 1.85 10*3/MM3 (ref 0.1–0.9)
MONOCYTES # BLD: 2.06 10*3/MM3 (ref 0.1–0.9)
MONOCYTES NFR BLD AUTO: 1.9 % (ref 5–12)
MONOCYTES NFR BLD AUTO: 2.7 % (ref 5–12)
MONOCYTES NFR BLD AUTO: 3.3 % (ref 5–12)
MONOCYTES NFR BLD AUTO: 3.5 % (ref 5–12)
MONOCYTES NFR BLD AUTO: 3.7 % (ref 5–12)
MONOCYTES NFR BLD AUTO: 3.7 % (ref 5–12)
MONOCYTES NFR BLD AUTO: 3.9 % (ref 5–12)
MRSA DNA SPEC QL NAA+PROBE: NORMAL
MRSA DNA SPEC QL NAA+PROBE: NORMAL
MYELOCYTES NFR BLD MANUAL: 1 % (ref 0–0)
MYELOCYTES NFR BLD MANUAL: 1 % (ref 0–0)
MYELOCYTES NFR BLD MANUAL: 3 % (ref 0–0)
NEUTROPHILS # BLD AUTO: 11.31 10*3/MM3 (ref 1.7–7)
NEUTROPHILS # BLD AUTO: 11.94 10*3/MM3 (ref 1.7–7)
NEUTROPHILS # BLD AUTO: 14.81 10*3/MM3 (ref 1.7–7)
NEUTROPHILS # BLD AUTO: 16.84 10*3/MM3 (ref 1.7–7)
NEUTROPHILS # BLD AUTO: 28.06 10*3/MM3 (ref 1.7–7)
NEUTROPHILS # BLD AUTO: 28.89 10*3/MM3 (ref 1.7–7)
NEUTROPHILS # BLD AUTO: 36.22 10*3/MM3 (ref 1.7–7)
NEUTROPHILS NFR BLD AUTO: 10.17 10*3/MM3 (ref 1.7–7)
NEUTROPHILS NFR BLD AUTO: 7.4 10*3/MM3 (ref 1.7–7)
NEUTROPHILS NFR BLD AUTO: 86.3 % (ref 42.7–76)
NEUTROPHILS NFR BLD AUTO: 87 % (ref 42.7–76)
NEUTROPHILS NFR BLD AUTO: 88.9 % (ref 42.7–76)
NEUTROPHILS NFR BLD AUTO: 89.9 % (ref 42.7–76)
NEUTROPHILS NFR BLD AUTO: 9.19 10*3/MM3 (ref 1.7–7)
NEUTROPHILS NFR BLD AUTO: 9.25 10*3/MM3 (ref 1.7–7)
NEUTROPHILS NFR BLD AUTO: 9.65 10*3/MM3 (ref 1.7–7)
NEUTROPHILS NFR BLD AUTO: 9.94 10*3/MM3 (ref 1.7–7)
NEUTROPHILS NFR BLD AUTO: 9.94 10*3/MM3 (ref 1.7–7)
NEUTROPHILS NFR BLD AUTO: 90 % (ref 42.7–76)
NEUTROPHILS NFR BLD MANUAL: 84 % (ref 42.7–76)
NEUTROPHILS NFR BLD MANUAL: 85.9 % (ref 42.7–76)
NEUTROPHILS NFR BLD MANUAL: 88.9 % (ref 42.7–76)
NEUTROPHILS NFR BLD MANUAL: 91 % (ref 42.7–76)
NEUTROPHILS NFR BLD MANUAL: 92.9 % (ref 42.7–76)
NEUTROPHILS NFR BLD MANUAL: 93.3 % (ref 42.7–76)
NEUTROPHILS NFR BLD MANUAL: 95 % (ref 42.7–76)
NRBC BLD AUTO-RTO: 0 /100 WBC (ref 0–0.2)
NRBC BLD AUTO-RTO: 0.1 /100 WBC (ref 0–0.2)
NT-PROBNP SERPL-MCNC: 355.9 PG/ML (ref 0–900)
O2 A-A PPRESDIFF RESPIRATORY: 0.1 MMHG
PCO2 BLDA: 46 MM HG (ref 35–45)
PEEP RESPIRATORY: 18 CM[H2O]
PH BLDA: 7.33 PH UNITS (ref 7.35–7.45)
PHOSPHATE SERPL-MCNC: 11.1 MG/DL (ref 2.5–4.5)
PHOSPHATE SERPL-MCNC: 3.6 MG/DL (ref 2.5–4.5)
PHOSPHATE SERPL-MCNC: 3.8 MG/DL (ref 2.5–4.5)
PHOSPHATE SERPL-MCNC: 3.8 MG/DL (ref 2.5–4.5)
PHOSPHATE SERPL-MCNC: 3.9 MG/DL (ref 2.5–4.5)
PHOSPHATE SERPL-MCNC: 4.1 MG/DL (ref 2.5–4.5)
PHOSPHATE SERPL-MCNC: 5.9 MG/DL (ref 2.5–4.5)
PHOSPHATE SERPL-MCNC: 6.2 MG/DL (ref 2.5–4.5)
PHOSPHATE SERPL-MCNC: 6.3 MG/DL (ref 2.5–4.5)
PHOSPHATE SERPL-MCNC: 6.7 MG/DL (ref 2.5–4.5)
PHOSPHATE SERPL-MCNC: 6.9 MG/DL (ref 2.5–4.5)
PHOSPHATE SERPL-MCNC: 7 MG/DL (ref 2.5–4.5)
PHOSPHATE SERPL-MCNC: 7.1 MG/DL (ref 2.5–4.5)
PHOSPHATE SERPL-MCNC: 7.5 MG/DL (ref 2.5–4.5)
PHOSPHATE SERPL-MCNC: 8.3 MG/DL (ref 2.5–4.5)
PLAT MORPH BLD: NORMAL
PLATELET # BLD AUTO: 292 10*3/MM3 (ref 140–450)
PLATELET # BLD AUTO: 320 10*3/MM3 (ref 140–450)
PLATELET # BLD AUTO: 324 10*3/MM3 (ref 140–450)
PLATELET # BLD AUTO: 329 10*3/MM3 (ref 140–450)
PLATELET # BLD AUTO: 348 10*3/MM3 (ref 140–450)
PLATELET # BLD AUTO: 360 10*3/MM3 (ref 140–450)
PLATELET # BLD AUTO: 380 10*3/MM3 (ref 140–450)
PLATELET # BLD AUTO: 392 10*3/MM3 (ref 140–450)
PLATELET # BLD AUTO: 407 10*3/MM3 (ref 140–450)
PLATELET # BLD AUTO: 415 10*3/MM3 (ref 140–450)
PLATELET # BLD AUTO: 418 10*3/MM3 (ref 140–450)
PLATELET # BLD AUTO: 419 10*3/MM3 (ref 140–450)
PLATELET # BLD AUTO: 429 10*3/MM3 (ref 140–450)
PLATELET # BLD AUTO: 447 10*3/MM3 (ref 140–450)
PMV BLD AUTO: 10 FL (ref 6–12)
PMV BLD AUTO: 10 FL (ref 6–12)
PMV BLD AUTO: 10.1 FL (ref 6–12)
PMV BLD AUTO: 10.2 FL (ref 6–12)
PMV BLD AUTO: 10.2 FL (ref 6–12)
PMV BLD AUTO: 10.4 FL (ref 6–12)
PMV BLD AUTO: 10.4 FL (ref 6–12)
PMV BLD AUTO: 10.7 FL (ref 6–12)
PMV BLD AUTO: 10.9 FL (ref 6–12)
PMV BLD AUTO: 11.1 FL (ref 6–12)
PMV BLD AUTO: 11.3 FL (ref 6–12)
PMV BLD AUTO: 11.7 FL (ref 6–12)
PO2 BLDA: 56 MM HG (ref 80–100)
POIKILOCYTOSIS BLD QL SMEAR: ABNORMAL
POLYCHROMASIA BLD QL SMEAR: ABNORMAL
POTASSIUM SERPL-SCNC: 3.7 MMOL/L (ref 3.5–5.2)
POTASSIUM SERPL-SCNC: 3.8 MMOL/L (ref 3.5–5.2)
POTASSIUM SERPL-SCNC: 3.9 MMOL/L (ref 3.5–5.2)
POTASSIUM SERPL-SCNC: 4 MMOL/L (ref 3.5–5.2)
POTASSIUM SERPL-SCNC: 4.4 MMOL/L (ref 3.5–5.2)
POTASSIUM SERPL-SCNC: 4.5 MMOL/L (ref 3.5–5.2)
POTASSIUM SERPL-SCNC: 4.6 MMOL/L (ref 3.5–5.2)
POTASSIUM SERPL-SCNC: 4.7 MMOL/L (ref 3.5–5.2)
POTASSIUM SERPL-SCNC: 4.8 MMOL/L (ref 3.5–5.2)
POTASSIUM SERPL-SCNC: 4.8 MMOL/L (ref 3.5–5.2)
POTASSIUM SERPL-SCNC: 4.9 MMOL/L (ref 3.5–5.2)
POTASSIUM SERPL-SCNC: 5 MMOL/L (ref 3.5–5.2)
POTASSIUM SERPL-SCNC: 5.2 MMOL/L (ref 3.5–5.2)
POTASSIUM SERPL-SCNC: 5.3 MMOL/L (ref 3.5–5.2)
POTASSIUM SERPL-SCNC: 5.3 MMOL/L (ref 3.5–5.2)
POTASSIUM SERPL-SCNC: 5.5 MMOL/L (ref 3.5–5.2)
POTASSIUM SERPL-SCNC: 6.7 MMOL/L (ref 3.5–5.2)
PROCALCITONIN SERPL-MCNC: 0.49 NG/ML (ref 0–0.25)
PROCALCITONIN SERPL-MCNC: 2.38 NG/ML (ref 0–0.25)
PROCALCITONIN SERPL-MCNC: 5.86 NG/ML (ref 0–0.25)
PROT ?TM UR-MCNC: 122.7 MG/DL
PROT SERPL-MCNC: 5.8 G/DL (ref 6–8.5)
PROT SERPL-MCNC: 6 G/DL (ref 6–8.5)
PROT SERPL-MCNC: 6.1 G/DL (ref 6–8.5)
PROT SERPL-MCNC: 6.1 G/DL (ref 6–8.5)
PROT SERPL-MCNC: 6.2 G/DL (ref 6–8.5)
PROT SERPL-MCNC: 6.2 G/DL (ref 6–8.5)
PROT SERPL-MCNC: 6.3 G/DL (ref 6–8.5)
PROT SERPL-MCNC: 6.3 G/DL (ref 6–8.5)
PROT SERPL-MCNC: 6.4 G/DL (ref 6–8.5)
PROT SERPL-MCNC: 6.4 G/DL (ref 6–8.5)
PROT SERPL-MCNC: 6.5 G/DL (ref 6–8.5)
PROT SERPL-MCNC: 6.6 G/DL (ref 6–8.5)
PROT SERPL-MCNC: 6.6 G/DL (ref 6–8.5)
PROT SERPL-MCNC: 6.7 G/DL (ref 6–8.5)
PROT SERPL-MCNC: 7.2 G/DL (ref 6–8.5)
PROT/CREAT UR: 685.9 MG/G CREA (ref 0–200)
PROTHROMBIN TIME: 17.4 SECONDS (ref 11.7–14.2)
QT INTERVAL: 349 MS
QT INTERVAL: 368 MS
QT INTERVAL: 408 MS
RBC # BLD AUTO: 4.16 10*6/MM3 (ref 4.14–5.8)
RBC # BLD AUTO: 4.26 10*6/MM3 (ref 4.14–5.8)
RBC # BLD AUTO: 4.32 10*6/MM3 (ref 4.14–5.8)
RBC # BLD AUTO: 4.34 10*6/MM3 (ref 4.14–5.8)
RBC # BLD AUTO: 4.38 10*6/MM3 (ref 4.14–5.8)
RBC # BLD AUTO: 4.4 10*6/MM3 (ref 4.14–5.8)
RBC # BLD AUTO: 4.59 10*6/MM3 (ref 4.14–5.8)
RBC # BLD AUTO: 4.61 10*6/MM3 (ref 4.14–5.8)
RBC # BLD AUTO: 4.68 10*6/MM3 (ref 4.14–5.8)
RBC # BLD AUTO: 4.73 10*6/MM3 (ref 4.14–5.8)
RBC # BLD AUTO: 4.83 10*6/MM3 (ref 4.14–5.8)
RBC # BLD AUTO: 4.89 10*6/MM3 (ref 4.14–5.8)
RBC # BLD AUTO: 4.94 10*6/MM3 (ref 4.14–5.8)
RBC # BLD AUTO: 5.03 10*6/MM3 (ref 4.14–5.8)
RBC MORPH BLD: NORMAL
SAO2 % BLDCOA: 86.4 % (ref 92–99)
SARS-COV-2 RNA RESP QL NAA+PROBE: DETECTED
SET MECH RESP RATE: 243
SMUDGE CELLS BLD QL SMEAR: ABNORMAL
SODIUM SERPL-SCNC: 133 MMOL/L (ref 136–145)
SODIUM SERPL-SCNC: 134 MMOL/L (ref 136–145)
SODIUM SERPL-SCNC: 135 MMOL/L (ref 136–145)
SODIUM SERPL-SCNC: 136 MMOL/L (ref 136–145)
SODIUM SERPL-SCNC: 137 MMOL/L (ref 136–145)
SODIUM SERPL-SCNC: 138 MMOL/L (ref 136–145)
SODIUM SERPL-SCNC: 138 MMOL/L (ref 136–145)
SODIUM SERPL-SCNC: 140 MMOL/L (ref 136–145)
SODIUM UR-SCNC: <20 MMOL/L
TOTAL RATE: 30 BREATHS/MINUTE
TRIGL SERPL-MCNC: 140 MG/DL (ref 0–150)
TRIGL SERPL-MCNC: 462 MG/DL (ref 0–150)
TROPONIN T SERPL-MCNC: <0.01 NG/ML (ref 0–0.03)
VANCOMYCIN SERPL-MCNC: 8.5 MCG/ML (ref 5–40)
VARIANT LYMPHS NFR BLD MANUAL: 1 % (ref 0–5)
VARIANT LYMPHS NFR BLD MANUAL: 1.3 % (ref 19.6–45.3)
VARIANT LYMPHS NFR BLD MANUAL: 2 % (ref 19.6–45.3)
VARIANT LYMPHS NFR BLD MANUAL: 3 % (ref 19.6–45.3)
VARIANT LYMPHS NFR BLD MANUAL: 5.1 % (ref 19.6–45.3)
VARIANT LYMPHS NFR BLD MANUAL: 7 % (ref 19.6–45.3)
VENTILATOR MODE: ABNORMAL
VLDLC SERPL-MCNC: 25 MG/DL (ref 5–40)
WBC MORPH BLD: NORMAL
WBC NRBC COR # BLD: 10.2 10*3/MM3 (ref 3.4–10.8)
WBC NRBC COR # BLD: 10.41 10*3/MM3 (ref 3.4–10.8)
WBC NRBC COR # BLD: 11.04 10*3/MM3 (ref 3.4–10.8)
WBC NRBC COR # BLD: 11.05 10*3/MM3 (ref 3.4–10.8)
WBC NRBC COR # BLD: 11.09 10*3/MM3 (ref 3.4–10.8)
WBC NRBC COR # BLD: 11.79 10*3/MM3 (ref 3.4–10.8)
WBC NRBC COR # BLD: 11.9 10*3/MM3 (ref 3.4–10.8)
WBC NRBC COR # BLD: 14.21 10*3/MM3 (ref 3.4–10.8)
WBC NRBC COR # BLD: 16.66 10*3/MM3 (ref 3.4–10.8)
WBC NRBC COR # BLD: 18.13 10*3/MM3 (ref 3.4–10.8)
WBC NRBC COR # BLD: 30.83 10*3/MM3 (ref 3.4–10.8)
WBC NRBC COR # BLD: 33.63 10*3/MM3 (ref 3.4–10.8)
WBC NRBC COR # BLD: 38.82 10*3/MM3 (ref 3.4–10.8)
WBC NRBC COR # BLD: 8.23 10*3/MM3 (ref 3.4–10.8)
WHOLE BLOOD HOLD SPECIMEN: NORMAL
WHOLE BLOOD HOLD SPECIMEN: NORMAL

## 2022-01-01 PROCEDURE — 85025 COMPLETE CBC W/AUTO DIFF WBC: CPT | Performed by: INTERNAL MEDICINE

## 2022-01-01 PROCEDURE — 93005 ELECTROCARDIOGRAM TRACING: CPT | Performed by: INTERNAL MEDICINE

## 2022-01-01 PROCEDURE — 93010 ELECTROCARDIOGRAM REPORT: CPT | Performed by: INTERNAL MEDICINE

## 2022-01-01 PROCEDURE — 82306 VITAMIN D 25 HYDROXY: CPT | Performed by: INTERNAL MEDICINE

## 2022-01-01 PROCEDURE — 94003 VENT MGMT INPAT SUBQ DAY: CPT

## 2022-01-01 PROCEDURE — 94799 UNLISTED PULMONARY SVC/PX: CPT

## 2022-01-01 PROCEDURE — 99232 SBSQ HOSP IP/OBS MODERATE 35: CPT | Performed by: STUDENT IN AN ORGANIZED HEALTH CARE EDUCATION/TRAINING PROGRAM

## 2022-01-01 PROCEDURE — 63710000001 DEXAMETHASONE PER 0.25 MG: Performed by: INTERNAL MEDICINE

## 2022-01-01 PROCEDURE — 82550 ASSAY OF CK (CPK): CPT

## 2022-01-01 PROCEDURE — 82248 BILIRUBIN DIRECT: CPT

## 2022-01-01 PROCEDURE — 97530 THERAPEUTIC ACTIVITIES: CPT

## 2022-01-01 PROCEDURE — 84100 ASSAY OF PHOSPHORUS: CPT | Performed by: INTERNAL MEDICINE

## 2022-01-01 PROCEDURE — 02HV33Z INSERTION OF INFUSION DEVICE INTO SUPERIOR VENA CAVA, PERCUTANEOUS APPROACH: ICD-10-PCS | Performed by: INTERNAL MEDICINE

## 2022-01-01 PROCEDURE — 85025 COMPLETE CBC W/AUTO DIFF WBC: CPT

## 2022-01-01 PROCEDURE — 80053 COMPREHEN METABOLIC PANEL: CPT

## 2022-01-01 PROCEDURE — 25010000002 CEFEPIME PER 500 MG: Performed by: STUDENT IN AN ORGANIZED HEALTH CARE EDUCATION/TRAINING PROGRAM

## 2022-01-01 PROCEDURE — 94760 N-INVAS EAR/PLS OXIMETRY 1: CPT

## 2022-01-01 PROCEDURE — 80053 COMPREHEN METABOLIC PANEL: CPT | Performed by: INTERNAL MEDICINE

## 2022-01-01 PROCEDURE — 84300 ASSAY OF URINE SODIUM: CPT | Performed by: INTERNAL MEDICINE

## 2022-01-01 PROCEDURE — 85384 FIBRINOGEN ACTIVITY: CPT

## 2022-01-01 PROCEDURE — 25010000002 FENTANYL CITRATE (PF) 250 MCG/5ML SOLUTION: Performed by: INTERNAL MEDICINE

## 2022-01-01 PROCEDURE — 36415 COLL VENOUS BLD VENIPUNCTURE: CPT

## 2022-01-01 PROCEDURE — 25010000002 DEXAMETHASONE PER 1 MG: Performed by: INTERNAL MEDICINE

## 2022-01-01 PROCEDURE — C1752 CATH,HEMODIALYSIS,SHORT-TERM: HCPCS

## 2022-01-01 PROCEDURE — 85379 FIBRIN DEGRADATION QUANT: CPT

## 2022-01-01 PROCEDURE — 94761 N-INVAS EAR/PLS OXIMETRY MLT: CPT

## 2022-01-01 PROCEDURE — C1751 CATH, INF, PER/CENT/MIDLINE: HCPCS

## 2022-01-01 PROCEDURE — 71045 X-RAY EXAM CHEST 1 VIEW: CPT

## 2022-01-01 PROCEDURE — 86140 C-REACTIVE PROTEIN: CPT

## 2022-01-01 PROCEDURE — 25010000002 LORAZEPAM PER 2 MG: Performed by: INTERNAL MEDICINE

## 2022-01-01 PROCEDURE — 94640 AIRWAY INHALATION TREATMENT: CPT

## 2022-01-01 PROCEDURE — 71275 CT ANGIOGRAPHY CHEST: CPT

## 2022-01-01 PROCEDURE — 83605 ASSAY OF LACTIC ACID: CPT

## 2022-01-01 PROCEDURE — 25010000002 ENOXAPARIN PER 10 MG: Performed by: INTERNAL MEDICINE

## 2022-01-01 PROCEDURE — 83735 ASSAY OF MAGNESIUM: CPT | Performed by: INTERNAL MEDICINE

## 2022-01-01 PROCEDURE — 86140 C-REACTIVE PROTEIN: CPT | Performed by: EMERGENCY MEDICINE

## 2022-01-01 PROCEDURE — 84145 PROCALCITONIN (PCT): CPT | Performed by: STUDENT IN AN ORGANIZED HEALTH CARE EDUCATION/TRAINING PROGRAM

## 2022-01-01 PROCEDURE — 25010000002 HEPARIN (PORCINE) 25000-0.45 UT/250ML-% SOLUTION: Performed by: INTERNAL MEDICINE

## 2022-01-01 PROCEDURE — 25010000002 PROPOFOL 10 MG/ML EMULSION: Performed by: INTERNAL MEDICINE

## 2022-01-01 PROCEDURE — 94660 CPAP INITIATION&MGMT: CPT

## 2022-01-01 PROCEDURE — 82728 ASSAY OF FERRITIN: CPT

## 2022-01-01 PROCEDURE — 85379 FIBRIN DEGRADATION QUANT: CPT | Performed by: EMERGENCY MEDICINE

## 2022-01-01 PROCEDURE — 82803 BLOOD GASES ANY COMBINATION: CPT

## 2022-01-01 PROCEDURE — 25010000002 FUROSEMIDE PER 20 MG: Performed by: INTERNAL MEDICINE

## 2022-01-01 PROCEDURE — 5A1D90Z PERFORMANCE OF URINARY FILTRATION, CONTINUOUS, GREATER THAN 18 HOURS PER DAY: ICD-10-PCS | Performed by: INTERNAL MEDICINE

## 2022-01-01 PROCEDURE — 83615 LACTATE (LD) (LDH) ENZYME: CPT

## 2022-01-01 PROCEDURE — 85007 BL SMEAR W/DIFF WBC COUNT: CPT | Performed by: INTERNAL MEDICINE

## 2022-01-01 PROCEDURE — 82962 GLUCOSE BLOOD TEST: CPT

## 2022-01-01 PROCEDURE — 25010000002 FENTANYL CITRATE (PF) 2500 MCG/50ML SOLUTION: Performed by: INTERNAL MEDICINE

## 2022-01-01 PROCEDURE — 25010000002 VANCOMYCIN 10 G RECONSTITUTED SOLUTION: Performed by: INTERNAL MEDICINE

## 2022-01-01 PROCEDURE — 25010000002 CEFTRIAXONE PER 250 MG: Performed by: INTERNAL MEDICINE

## 2022-01-01 PROCEDURE — 87070 CULTURE OTHR SPECIMN AEROBIC: CPT | Performed by: STUDENT IN AN ORGANIZED HEALTH CARE EDUCATION/TRAINING PROGRAM

## 2022-01-01 PROCEDURE — 5A1945Z RESPIRATORY VENTILATION, 24-96 CONSECUTIVE HOURS: ICD-10-PCS | Performed by: INTERNAL MEDICINE

## 2022-01-01 PROCEDURE — 84484 ASSAY OF TROPONIN QUANT: CPT | Performed by: INTERNAL MEDICINE

## 2022-01-01 PROCEDURE — 85730 THROMBOPLASTIN TIME PARTIAL: CPT | Performed by: INTERNAL MEDICINE

## 2022-01-01 PROCEDURE — 97110 THERAPEUTIC EXERCISES: CPT

## 2022-01-01 PROCEDURE — 25010000002 HEPARIN (PORCINE) PER 1000 UNITS: Performed by: INTERNAL MEDICINE

## 2022-01-01 PROCEDURE — 84484 ASSAY OF TROPONIN QUANT: CPT

## 2022-01-01 PROCEDURE — 84100 ASSAY OF PHOSPHORUS: CPT

## 2022-01-01 PROCEDURE — 85610 PROTHROMBIN TIME: CPT | Performed by: INTERNAL MEDICINE

## 2022-01-01 PROCEDURE — 93005 ELECTROCARDIOGRAM TRACING: CPT | Performed by: EMERGENCY MEDICINE

## 2022-01-01 PROCEDURE — 87205 SMEAR GRAM STAIN: CPT | Performed by: STUDENT IN AN ORGANIZED HEALTH CARE EDUCATION/TRAINING PROGRAM

## 2022-01-01 PROCEDURE — 25010000002 VANCOMYCIN PER 500 MG: Performed by: INTERNAL MEDICINE

## 2022-01-01 PROCEDURE — 25010000002 DEXAMETHASONE PER 1 MG: Performed by: EMERGENCY MEDICINE

## 2022-01-01 PROCEDURE — 25010000002 CEFTRIAXONE PER 250 MG: Performed by: EMERGENCY MEDICINE

## 2022-01-01 PROCEDURE — 82248 BILIRUBIN DIRECT: CPT | Performed by: EMERGENCY MEDICINE

## 2022-01-01 PROCEDURE — 84484 ASSAY OF TROPONIN QUANT: CPT | Performed by: EMERGENCY MEDICINE

## 2022-01-01 PROCEDURE — 25010000002 CEFEPIME PER 500 MG: Performed by: INTERNAL MEDICINE

## 2022-01-01 PROCEDURE — 87040 BLOOD CULTURE FOR BACTERIA: CPT | Performed by: EMERGENCY MEDICINE

## 2022-01-01 PROCEDURE — 25010000002 FENTANYL CITRATE (PF) 50 MCG/ML SOLUTION: Performed by: INTERNAL MEDICINE

## 2022-01-01 PROCEDURE — 87641 MR-STAPH DNA AMP PROBE: CPT | Performed by: STUDENT IN AN ORGANIZED HEALTH CARE EDUCATION/TRAINING PROGRAM

## 2022-01-01 PROCEDURE — 80202 ASSAY OF VANCOMYCIN: CPT | Performed by: INTERNAL MEDICINE

## 2022-01-01 PROCEDURE — XW033E5 INTRODUCTION OF REMDESIVIR ANTI-INFECTIVE INTO PERIPHERAL VEIN, PERCUTANEOUS APPROACH, NEW TECHNOLOGY GROUP 5: ICD-10-PCS | Performed by: INTERNAL MEDICINE

## 2022-01-01 PROCEDURE — 87641 MR-STAPH DNA AMP PROBE: CPT | Performed by: INTERNAL MEDICINE

## 2022-01-01 PROCEDURE — 82330 ASSAY OF CALCIUM: CPT | Performed by: INTERNAL MEDICINE

## 2022-01-01 PROCEDURE — 82436 ASSAY OF URINE CHLORIDE: CPT | Performed by: INTERNAL MEDICINE

## 2022-01-01 PROCEDURE — 84478 ASSAY OF TRIGLYCERIDES: CPT | Performed by: INTERNAL MEDICINE

## 2022-01-01 PROCEDURE — 0 IOPAMIDOL PER 1 ML: Performed by: INTERNAL MEDICINE

## 2022-01-01 PROCEDURE — 82570 ASSAY OF URINE CREATININE: CPT | Performed by: INTERNAL MEDICINE

## 2022-01-01 PROCEDURE — 80053 COMPREHEN METABOLIC PANEL: CPT | Performed by: EMERGENCY MEDICINE

## 2022-01-01 PROCEDURE — 84156 ASSAY OF PROTEIN URINE: CPT | Performed by: INTERNAL MEDICINE

## 2022-01-01 PROCEDURE — 31500 INSERT EMERGENCY AIRWAY: CPT | Performed by: INTERNAL MEDICINE

## 2022-01-01 PROCEDURE — 99255 IP/OBS CONSLTJ NEW/EST HI 80: CPT | Performed by: STUDENT IN AN ORGANIZED HEALTH CARE EDUCATION/TRAINING PROGRAM

## 2022-01-01 PROCEDURE — 0BH17EZ INSERTION OF ENDOTRACHEAL AIRWAY INTO TRACHEA, VIA NATURAL OR ARTIFICIAL OPENING: ICD-10-PCS | Performed by: INTERNAL MEDICINE

## 2022-01-01 PROCEDURE — 25010000002 PHENYLEPHRINE 10 MG/ML SOLUTION: Performed by: INTERNAL MEDICINE

## 2022-01-01 PROCEDURE — 83880 ASSAY OF NATRIURETIC PEPTIDE: CPT | Performed by: EMERGENCY MEDICINE

## 2022-01-01 PROCEDURE — 25010000002 FENTANYL CITRATE (PF) 1000 MCG/20ML SOLUTION: Performed by: INTERNAL MEDICINE

## 2022-01-01 PROCEDURE — 84145 PROCALCITONIN (PCT): CPT | Performed by: EMERGENCY MEDICINE

## 2022-01-01 PROCEDURE — 97162 PT EVAL MOD COMPLEX 30 MIN: CPT

## 2022-01-01 PROCEDURE — 25010000002 AMIODARONE IN DEXTROSE 5% 360-4.14 MG/200ML-% SOLUTION: Performed by: INTERNAL MEDICINE

## 2022-01-01 PROCEDURE — 99284 EMERGENCY DEPT VISIT MOD MDM: CPT

## 2022-01-01 PROCEDURE — 85025 COMPLETE CBC W/AUTO DIFF WBC: CPT | Performed by: EMERGENCY MEDICINE

## 2022-01-01 PROCEDURE — 80061 LIPID PANEL: CPT | Performed by: INTERNAL MEDICINE

## 2022-01-01 PROCEDURE — 36600 WITHDRAWAL OF ARTERIAL BLOOD: CPT

## 2022-01-01 PROCEDURE — 25010000002 AMIODARONE IN DEXTROSE 5% 150-4.21 MG/100ML-% SOLUTION: Performed by: INTERNAL MEDICINE

## 2022-01-01 PROCEDURE — 87040 BLOOD CULTURE FOR BACTERIA: CPT | Performed by: INTERNAL MEDICINE

## 2022-01-01 PROCEDURE — U0003 INFECTIOUS AGENT DETECTION BY NUCLEIC ACID (DNA OR RNA); SEVERE ACUTE RESPIRATORY SYNDROME CORONAVIRUS 2 (SARS-COV-2) (CORONAVIRUS DISEASE [COVID-19]), AMPLIFIED PROBE TECHNIQUE, MAKING USE OF HIGH THROUGHPUT TECHNOLOGIES AS DESCRIBED BY CMS-2020-01-R: HCPCS | Performed by: EMERGENCY MEDICINE

## 2022-01-01 PROCEDURE — 94002 VENT MGMT INPAT INIT DAY: CPT

## 2022-01-01 RX ORDER — DEXAMETHASONE SODIUM PHOSPHATE 10 MG/ML
6 INJECTION INTRAMUSCULAR; INTRAVENOUS ONCE
Status: COMPLETED | OUTPATIENT
Start: 2022-01-01 | End: 2022-01-01

## 2022-01-01 RX ORDER — SCOLOPAMINE TRANSDERMAL SYSTEM 1 MG/1
1 PATCH, EXTENDED RELEASE TRANSDERMAL
Status: DISCONTINUED | OUTPATIENT
Start: 2022-01-01 | End: 2022-01-01 | Stop reason: HOSPADM

## 2022-01-01 RX ORDER — FENTANYL CITRATE 50 UG/ML
300 INJECTION, SOLUTION INTRAMUSCULAR; INTRAVENOUS ONCE
Status: DISCONTINUED | OUTPATIENT
Start: 2022-01-01 | End: 2022-01-01

## 2022-01-01 RX ORDER — FENTANYL CITRATE 50 UG/ML
200 INJECTION, SOLUTION INTRAMUSCULAR; INTRAVENOUS ONCE
Status: DISCONTINUED | OUTPATIENT
Start: 2022-01-01 | End: 2022-01-01

## 2022-01-01 RX ORDER — ATROPINE SULFATE 0.4 MG/ML
0.4 AMPUL (ML) INJECTION ONCE
Status: DISCONTINUED | OUTPATIENT
Start: 2022-01-01 | End: 2022-01-01

## 2022-01-01 RX ORDER — GLYCOPYRROLATE 0.2 MG/ML
0.4 INJECTION INTRAMUSCULAR; INTRAVENOUS
Status: DISCONTINUED | OUTPATIENT
Start: 2022-01-01 | End: 2022-01-01 | Stop reason: HOSPADM

## 2022-01-01 RX ORDER — MORPHINE SULFATE 2 MG/ML
6 INJECTION, SOLUTION INTRAMUSCULAR; INTRAVENOUS
Status: DISCONTINUED | OUTPATIENT
Start: 2022-01-01 | End: 2022-01-01 | Stop reason: HOSPADM

## 2022-01-01 RX ORDER — CALCIUM CHLORIDE, MAGNESIUM CHLORIDE, SODIUM CHLORIDE, SODIUM BICARBONATE, POTASSIUM CHLORIDE AND SODIUM PHOSPHATE DIBASIC DIHYDRATE 3.68; 3.05; 6.34; 3.09; .314; .187 G/L; G/L; G/L; G/L; G/L; G/L
1500 INJECTION INTRAVENOUS CONTINUOUS
Status: DISCONTINUED | OUTPATIENT
Start: 2022-01-01 | End: 2022-01-01 | Stop reason: HOSPADM

## 2022-01-01 RX ORDER — ACETAMINOPHEN 160 MG/5ML
650 SOLUTION ORAL EVERY 4 HOURS PRN
Status: DISCONTINUED | OUTPATIENT
Start: 2022-01-01 | End: 2022-01-01 | Stop reason: HOSPADM

## 2022-01-01 RX ORDER — IPRATROPIUM BROMIDE AND ALBUTEROL SULFATE 2.5; .5 MG/3ML; MG/3ML
3 SOLUTION RESPIRATORY (INHALATION)
Status: DISCONTINUED | OUTPATIENT
Start: 2022-01-01 | End: 2022-01-01

## 2022-01-01 RX ORDER — PROPOFOL 10 MG/ML
100 VIAL (ML) INTRAVENOUS ONCE
Status: COMPLETED | OUTPATIENT
Start: 2022-01-01 | End: 2022-01-01

## 2022-01-01 RX ORDER — ACETAMINOPHEN 325 MG/1
650 TABLET ORAL EVERY 4 HOURS PRN
Status: DISCONTINUED | OUTPATIENT
Start: 2022-01-01 | End: 2022-01-01 | Stop reason: HOSPADM

## 2022-01-01 RX ORDER — SODIUM CHLORIDE 0.9 % (FLUSH) 0.9 %
10 SYRINGE (ML) INJECTION AS NEEDED
Status: DISCONTINUED | OUTPATIENT
Start: 2022-01-01 | End: 2022-01-01 | Stop reason: HOSPADM

## 2022-01-01 RX ORDER — ALPRAZOLAM 0.5 MG/1
0.5 TABLET ORAL 3 TIMES DAILY PRN
Status: DISPENSED | OUTPATIENT
Start: 2022-01-01 | End: 2022-01-01

## 2022-01-01 RX ORDER — UREA 10 %
3 LOTION (ML) TOPICAL NIGHTLY PRN
Status: DISCONTINUED | OUTPATIENT
Start: 2022-01-01 | End: 2022-01-01 | Stop reason: HOSPADM

## 2022-01-01 RX ORDER — HEPARIN SODIUM 10000 [USP'U]/100ML
14.6 INJECTION, SOLUTION INTRAVENOUS
Status: DISCONTINUED | OUTPATIENT
Start: 2022-01-01 | End: 2022-01-01 | Stop reason: HOSPADM

## 2022-01-01 RX ORDER — ONDANSETRON 4 MG/1
4 TABLET, FILM COATED ORAL EVERY 4 HOURS PRN
Status: DISCONTINUED | OUTPATIENT
Start: 2022-01-01 | End: 2022-01-01 | Stop reason: HOSPADM

## 2022-01-01 RX ORDER — SODIUM POLYSTYRENE SULFONATE 15 G/60ML
25 SUSPENSION ORAL; RECTAL ONCE
Status: DISCONTINUED | OUTPATIENT
Start: 2022-01-01 | End: 2022-01-01 | Stop reason: HOSPADM

## 2022-01-01 RX ORDER — LORAZEPAM 2 MG/ML
1 INJECTION INTRAMUSCULAR ONCE
Status: COMPLETED | OUTPATIENT
Start: 2022-01-01 | End: 2022-01-01

## 2022-01-01 RX ORDER — FENTANYL CITRATE 50 UG/ML
150 INJECTION, SOLUTION INTRAMUSCULAR; INTRAVENOUS ONCE
Status: COMPLETED | OUTPATIENT
Start: 2022-01-01 | End: 2022-01-01

## 2022-01-01 RX ORDER — MORPHINE SULFATE 2 MG/ML
4 INJECTION, SOLUTION INTRAMUSCULAR; INTRAVENOUS
Status: DISCONTINUED | OUTPATIENT
Start: 2022-01-01 | End: 2022-01-01 | Stop reason: HOSPADM

## 2022-01-01 RX ORDER — CISATRACURIUM BESYLATE 2 MG/ML
100 INJECTION, SOLUTION INTRAVENOUS ONCE
Status: COMPLETED | OUTPATIENT
Start: 2022-01-01 | End: 2022-01-01

## 2022-01-01 RX ORDER — LORAZEPAM 2 MG/ML
2 INJECTION INTRAMUSCULAR
Status: DISCONTINUED | OUTPATIENT
Start: 2022-01-01 | End: 2022-01-01 | Stop reason: HOSPADM

## 2022-01-01 RX ORDER — IVERMECTIN 3 MG/1
3 TABLET ORAL ONCE
COMMUNITY

## 2022-01-01 RX ORDER — MELATONIN
1000 DAILY
Status: DISCONTINUED | OUTPATIENT
Start: 2022-01-01 | End: 2022-01-01 | Stop reason: HOSPADM

## 2022-01-01 RX ORDER — NOREPINEPHRINE BIT/0.9 % NACL 8 MG/250ML
INFUSION BOTTLE (ML) INTRAVENOUS
Status: COMPLETED
Start: 2022-01-01 | End: 2022-01-01

## 2022-01-01 RX ORDER — LORAZEPAM 2 MG/ML
1 CONCENTRATE ORAL
Status: DISCONTINUED | OUTPATIENT
Start: 2022-01-01 | End: 2022-01-01 | Stop reason: HOSPADM

## 2022-01-01 RX ORDER — POTASSIUM CHLORIDE 29.8 MG/ML
20 INJECTION INTRAVENOUS AS NEEDED
Status: DISCONTINUED | OUTPATIENT
Start: 2022-01-01 | End: 2022-01-01 | Stop reason: HOSPADM

## 2022-01-01 RX ORDER — LORAZEPAM 2 MG/ML
0.5 INJECTION INTRAMUSCULAR
Status: DISCONTINUED | OUTPATIENT
Start: 2022-01-01 | End: 2022-01-01 | Stop reason: HOSPADM

## 2022-01-01 RX ORDER — NOREPINEPHRINE BIT/0.9 % NACL 8 MG/250ML
.02-.3 INFUSION BOTTLE (ML) INTRAVENOUS
Status: DISCONTINUED | OUTPATIENT
Start: 2022-01-01 | End: 2022-01-01 | Stop reason: HOSPADM

## 2022-01-01 RX ORDER — LORAZEPAM 2 MG/ML
2 CONCENTRATE ORAL
Status: DISCONTINUED | OUTPATIENT
Start: 2022-01-01 | End: 2022-01-01 | Stop reason: HOSPADM

## 2022-01-01 RX ORDER — LORAZEPAM 2 MG/ML
1 INJECTION INTRAMUSCULAR
Status: DISCONTINUED | OUTPATIENT
Start: 2022-01-01 | End: 2022-01-01 | Stop reason: HOSPADM

## 2022-01-01 RX ORDER — MAGNESIUM SULFATE HEPTAHYDRATE 40 MG/ML
2 INJECTION, SOLUTION INTRAVENOUS AS NEEDED
Status: ACTIVE | OUTPATIENT
Start: 2022-01-01 | End: 2022-01-01

## 2022-01-01 RX ORDER — VANCOMYCIN HYDROCHLORIDE 1 G/200ML
1000 INJECTION, SOLUTION INTRAVENOUS ONCE
Status: COMPLETED | OUTPATIENT
Start: 2022-01-01 | End: 2022-01-01

## 2022-01-01 RX ORDER — FAMOTIDINE 20 MG/1
20 TABLET, FILM COATED ORAL DAILY
Status: DISCONTINUED | OUTPATIENT
Start: 2022-01-01 | End: 2022-01-01

## 2022-01-01 RX ORDER — FAMOTIDINE 10 MG/ML
20 INJECTION, SOLUTION INTRAVENOUS DAILY
Status: DISCONTINUED | OUTPATIENT
Start: 2022-01-01 | End: 2022-01-01 | Stop reason: HOSPADM

## 2022-01-01 RX ORDER — MORPHINE SULFATE 20 MG/ML
20 SOLUTION ORAL
Status: DISCONTINUED | OUTPATIENT
Start: 2022-01-01 | End: 2022-01-01 | Stop reason: HOSPADM

## 2022-01-01 RX ORDER — DILTIAZEM HYDROCHLORIDE 5 MG/ML
20 INJECTION INTRAVENOUS ONCE
Status: COMPLETED | OUTPATIENT
Start: 2022-01-01 | End: 2022-01-01

## 2022-01-01 RX ORDER — DEXAMETHASONE SODIUM PHOSPHATE 10 MG/ML
6 INJECTION INTRAMUSCULAR; INTRAVENOUS EVERY 12 HOURS SCHEDULED
Status: DISCONTINUED | OUTPATIENT
Start: 2022-01-01 | End: 2022-01-01 | Stop reason: HOSPADM

## 2022-01-01 RX ORDER — LIDOCAINE HYDROCHLORIDE 20 MG/ML
5 SOLUTION OROPHARYNGEAL EVERY 4 HOURS PRN
Status: DISCONTINUED | OUTPATIENT
Start: 2022-01-01 | End: 2022-01-01 | Stop reason: HOSPADM

## 2022-01-01 RX ORDER — FAMOTIDINE 10 MG/ML
20 INJECTION, SOLUTION INTRAVENOUS EVERY 12 HOURS SCHEDULED
Status: DISCONTINUED | OUTPATIENT
Start: 2022-01-01 | End: 2022-01-01

## 2022-01-01 RX ORDER — MORPHINE SULFATE 20 MG/ML
10 SOLUTION ORAL
Status: DISCONTINUED | OUTPATIENT
Start: 2022-01-01 | End: 2022-01-01 | Stop reason: HOSPADM

## 2022-01-01 RX ORDER — AMOXICILLIN 250 MG
2 CAPSULE ORAL 2 TIMES DAILY
Status: DISCONTINUED | OUTPATIENT
Start: 2022-01-01 | End: 2022-01-01 | Stop reason: HOSPADM

## 2022-01-01 RX ORDER — LORAZEPAM 2 MG/ML
0.5 CONCENTRATE ORAL
Status: DISCONTINUED | OUTPATIENT
Start: 2022-01-01 | End: 2022-01-01 | Stop reason: HOSPADM

## 2022-01-01 RX ORDER — ALBUTEROL SULFATE 90 UG/1
6 AEROSOL, METERED RESPIRATORY (INHALATION)
Status: DISCONTINUED | OUTPATIENT
Start: 2022-01-01 | End: 2022-01-01

## 2022-01-01 RX ORDER — POLYETHYLENE GLYCOL 3350 17 G/17G
17 POWDER, FOR SOLUTION ORAL DAILY
Status: DISCONTINUED | OUTPATIENT
Start: 2022-01-01 | End: 2022-01-01 | Stop reason: HOSPADM

## 2022-01-01 RX ORDER — HEPARIN SODIUM 5000 [USP'U]/ML
40-80 INJECTION, SOLUTION INTRAVENOUS; SUBCUTANEOUS EVERY 6 HOURS PRN
Status: DISCONTINUED | OUTPATIENT
Start: 2022-01-01 | End: 2022-01-01 | Stop reason: HOSPADM

## 2022-01-01 RX ORDER — FUROSEMIDE 10 MG/ML
40 INJECTION INTRAMUSCULAR; INTRAVENOUS DAILY
Status: DISCONTINUED | OUTPATIENT
Start: 2022-01-01 | End: 2022-01-01

## 2022-01-01 RX ORDER — FUROSEMIDE 10 MG/ML
40 INJECTION INTRAMUSCULAR; INTRAVENOUS ONCE
Status: COMPLETED | OUTPATIENT
Start: 2022-01-01 | End: 2022-01-01

## 2022-01-01 RX ORDER — ONDANSETRON 2 MG/ML
4 INJECTION INTRAMUSCULAR; INTRAVENOUS EVERY 4 HOURS PRN
Status: DISCONTINUED | OUTPATIENT
Start: 2022-01-01 | End: 2022-01-01 | Stop reason: HOSPADM

## 2022-01-01 RX ORDER — FENTANYL CITRATE-0.9 % NACL/PF 5 MCG/ML
50-300 PLASTIC BAG, INJECTION (ML) INTRAVENOUS
Status: DISCONTINUED | OUTPATIENT
Start: 2022-01-01 | End: 2022-01-01 | Stop reason: HOSPADM

## 2022-01-01 RX ORDER — FAMOTIDINE 20 MG/1
20 TABLET, FILM COATED ORAL 2 TIMES DAILY
Status: DISCONTINUED | OUTPATIENT
Start: 2022-01-01 | End: 2022-01-01

## 2022-01-01 RX ORDER — SODIUM CHLORIDE 0.9 % (FLUSH) 0.9 %
10 SYRINGE (ML) INJECTION EVERY 12 HOURS SCHEDULED
Status: DISCONTINUED | OUTPATIENT
Start: 2022-01-01 | End: 2022-01-01 | Stop reason: HOSPADM

## 2022-01-01 RX ORDER — NITROGLYCERIN 0.4 MG/1
0.4 TABLET SUBLINGUAL
Status: DISCONTINUED | OUTPATIENT
Start: 2022-01-01 | End: 2022-01-01 | Stop reason: HOSPADM

## 2022-01-01 RX ORDER — HYDROMORPHONE HCL 110MG/55ML
1.5 PATIENT CONTROLLED ANALGESIA SYRINGE INTRAVENOUS
Status: DISCONTINUED | OUTPATIENT
Start: 2022-01-01 | End: 2022-01-01 | Stop reason: HOSPADM

## 2022-01-01 RX ORDER — GLYCOPYRROLATE 0.2 MG/ML
0.2 INJECTION INTRAMUSCULAR; INTRAVENOUS
Status: DISCONTINUED | OUTPATIENT
Start: 2022-01-01 | End: 2022-01-01 | Stop reason: HOSPADM

## 2022-01-01 RX ORDER — PRAVASTATIN SODIUM 10 MG
10 TABLET ORAL NIGHTLY
Status: DISCONTINUED | OUTPATIENT
Start: 2022-01-01 | End: 2022-01-01 | Stop reason: HOSPADM

## 2022-01-01 RX ORDER — ROCURONIUM BROMIDE 10 MG/ML
100 INJECTION, SOLUTION INTRAVENOUS ONCE
Status: COMPLETED | OUTPATIENT
Start: 2022-01-01 | End: 2022-01-01

## 2022-01-01 RX ORDER — DIPHENOXYLATE HYDROCHLORIDE AND ATROPINE SULFATE 2.5; .025 MG/1; MG/1
1 TABLET ORAL
Status: DISCONTINUED | OUTPATIENT
Start: 2022-01-01 | End: 2022-01-01 | Stop reason: HOSPADM

## 2022-01-01 RX ORDER — PROPOFOL 10 MG/ML
200 VIAL (ML) INTRAVENOUS ONCE
Status: DISCONTINUED | OUTPATIENT
Start: 2022-01-01 | End: 2022-01-01

## 2022-01-01 RX ORDER — HEPARIN SODIUM 5000 [USP'U]/ML
80 INJECTION, SOLUTION INTRAVENOUS; SUBCUTANEOUS ONCE
Status: COMPLETED | OUTPATIENT
Start: 2022-01-01 | End: 2022-01-01

## 2022-01-01 RX ORDER — HEPARIN SODIUM 1000 [USP'U]/ML
INJECTION, SOLUTION INTRAVENOUS; SUBCUTANEOUS AS NEEDED
Status: DISCONTINUED | OUTPATIENT
Start: 2022-01-01 | End: 2022-01-01 | Stop reason: HOSPADM

## 2022-01-01 RX ORDER — PHENYLEPHRINE HCL IN 0.9% NACL 0.5 MG/5ML
.5-3 SYRINGE (ML) INTRAVENOUS
Status: DISCONTINUED | OUTPATIENT
Start: 2022-01-01 | End: 2022-01-01 | Stop reason: HOSPADM

## 2022-01-01 RX ORDER — ACETAMINOPHEN 650 MG/1
650 SUPPOSITORY RECTAL EVERY 4 HOURS PRN
Status: DISCONTINUED | OUTPATIENT
Start: 2022-01-01 | End: 2022-01-01 | Stop reason: HOSPADM

## 2022-01-01 RX ADMIN — CALCIUM CHLORIDE, MAGNESIUM CHLORIDE, SODIUM CHLORIDE, SODIUM BICARBONATE, POTASSIUM CHLORIDE AND SODIUM PHOSPHATE DIBASIC DIHYDRATE 1500 ML/HR: 3.68; 3.05; 6.34; 3.09; .314; .187 INJECTION INTRAVENOUS at 19:48

## 2022-01-01 RX ADMIN — CALCIUM CHLORIDE, MAGNESIUM CHLORIDE, SODIUM CHLORIDE, SODIUM BICARBONATE, POTASSIUM CHLORIDE AND SODIUM PHOSPHATE DIBASIC DIHYDRATE 1500 ML/HR: 3.68; 3.05; 6.34; 3.09; .314; .187 INJECTION INTRAVENOUS at 08:22

## 2022-01-01 RX ADMIN — Medication 1000 UNITS: at 09:32

## 2022-01-01 RX ADMIN — Medication 1000 UNITS: at 08:18

## 2022-01-01 RX ADMIN — SODIUM CHLORIDE, PRESERVATIVE FREE 10 ML: 5 INJECTION INTRAVENOUS at 08:12

## 2022-01-01 RX ADMIN — FUROSEMIDE 40 MG: 10 INJECTION, SOLUTION INTRAVENOUS at 08:17

## 2022-01-01 RX ADMIN — MINERAL OIL AND WHITE PETROLATUM: 150; 830 OINTMENT OPHTHALMIC at 00:09

## 2022-01-01 RX ADMIN — CALCIUM CHLORIDE, MAGNESIUM CHLORIDE, SODIUM CHLORIDE, SODIUM BICARBONATE, POTASSIUM CHLORIDE AND SODIUM PHOSPHATE DIBASIC DIHYDRATE 1500 ML/HR: 3.68; 3.05; 6.34; 3.09; .314; .187 INJECTION INTRAVENOUS at 16:21

## 2022-01-01 RX ADMIN — ENOXAPARIN SODIUM 40 MG: 40 INJECTION SUBCUTANEOUS at 15:28

## 2022-01-01 RX ADMIN — FAMOTIDINE 20 MG: 10 INJECTION INTRAVENOUS at 15:51

## 2022-01-01 RX ADMIN — ENOXAPARIN SODIUM 80 MG: 80 INJECTION SUBCUTANEOUS at 01:10

## 2022-01-01 RX ADMIN — ROCURONIUM BROMIDE 100 MG: 10 INJECTION, SOLUTION INTRAVENOUS at 12:25

## 2022-01-01 RX ADMIN — LORAZEPAM 1 MG: 2 INJECTION INTRAMUSCULAR; INTRAVENOUS at 01:35

## 2022-01-01 RX ADMIN — ENOXAPARIN SODIUM 110 MG: 120 INJECTION, SOLUTION INTRAVENOUS; SUBCUTANEOUS at 13:32

## 2022-01-01 RX ADMIN — FAMOTIDINE 20 MG: 20 TABLET, FILM COATED ORAL at 08:11

## 2022-01-01 RX ADMIN — REMDESIVIR 100 MG: 100 INJECTION, POWDER, LYOPHILIZED, FOR SOLUTION INTRAVENOUS at 22:12

## 2022-01-01 RX ADMIN — CALCIUM CHLORIDE, MAGNESIUM CHLORIDE, SODIUM CHLORIDE, SODIUM BICARBONATE, POTASSIUM CHLORIDE AND SODIUM PHOSPHATE DIBASIC DIHYDRATE 1500 ML/HR: 3.68; 3.05; 6.34; 3.09; .314; .187 INJECTION INTRAVENOUS at 12:20

## 2022-01-01 RX ADMIN — Medication 1000 UNITS: at 09:16

## 2022-01-01 RX ADMIN — PROPOFOL 100 MG: 10 INJECTION, EMULSION INTRAVENOUS at 12:24

## 2022-01-01 RX ADMIN — PROPOFOL 30 MCG/KG/MIN: 10 INJECTION, EMULSION INTRAVENOUS at 21:38

## 2022-01-01 RX ADMIN — CALCIUM CHLORIDE, MAGNESIUM CHLORIDE, SODIUM CHLORIDE, SODIUM BICARBONATE, POTASSIUM CHLORIDE AND SODIUM PHOSPHATE DIBASIC DIHYDRATE 1500 ML/HR: 3.68; 3.05; 6.34; 3.09; .314; .187 INJECTION INTRAVENOUS at 13:47

## 2022-01-01 RX ADMIN — POLYETHYLENE GLYCOL 3350 17 G: 17 POWDER, FOR SOLUTION ORAL at 10:12

## 2022-01-01 RX ADMIN — PROPOFOL 25 MCG/KG/MIN: 10 INJECTION, EMULSION INTRAVENOUS at 19:49

## 2022-01-01 RX ADMIN — CEFEPIME HYDROCHLORIDE 2 G: 2 INJECTION, POWDER, FOR SOLUTION INTRAVENOUS at 05:52

## 2022-01-01 RX ADMIN — DEXAMETHASONE 6 MG: 4 TABLET ORAL at 09:16

## 2022-01-01 RX ADMIN — SODIUM CHLORIDE, PRESERVATIVE FREE 10 ML: 5 INJECTION INTRAVENOUS at 10:18

## 2022-01-01 RX ADMIN — IOPAMIDOL 100 ML: 755 INJECTION, SOLUTION INTRAVENOUS at 07:00

## 2022-01-01 RX ADMIN — CEFEPIME HYDROCHLORIDE 2 G: 2 INJECTION, POWDER, FOR SOLUTION INTRAVENOUS at 17:59

## 2022-01-01 RX ADMIN — FAMOTIDINE 20 MG: 20 TABLET, FILM COATED ORAL at 09:05

## 2022-01-01 RX ADMIN — CEFEPIME HYDROCHLORIDE 2 G: 2 INJECTION, POWDER, FOR SOLUTION INTRAVENOUS at 17:04

## 2022-01-01 RX ADMIN — PROPOFOL 40 MCG/KG/MIN: 10 INJECTION, EMULSION INTRAVENOUS at 00:48

## 2022-01-01 RX ADMIN — MINERAL OIL AND WHITE PETROLATUM: 150; 830 OINTMENT OPHTHALMIC at 09:06

## 2022-01-01 RX ADMIN — ACETAMINOPHEN 650 MG: 325 TABLET, FILM COATED ORAL at 21:56

## 2022-01-01 RX ADMIN — IPRATROPIUM BROMIDE AND ALBUTEROL SULFATE 3 ML: .5; 3 SOLUTION RESPIRATORY (INHALATION) at 07:31

## 2022-01-01 RX ADMIN — ALPRAZOLAM 0.5 MG: 0.5 TABLET ORAL at 08:11

## 2022-01-01 RX ADMIN — PRAVASTATIN SODIUM 10 MG: 10 TABLET ORAL at 23:00

## 2022-01-01 RX ADMIN — MINERAL OIL AND WHITE PETROLATUM: 150; 830 OINTMENT OPHTHALMIC at 18:03

## 2022-01-01 RX ADMIN — CEFEPIME HYDROCHLORIDE 2 G: 2 INJECTION, POWDER, FOR SOLUTION INTRAVENOUS at 08:14

## 2022-01-01 RX ADMIN — PROPOFOL 40 MCG/KG/MIN: 10 INJECTION, EMULSION INTRAVENOUS at 08:37

## 2022-01-01 RX ADMIN — DEXAMETHASONE 6 MG: 4 TABLET ORAL at 23:13

## 2022-01-01 RX ADMIN — IPRATROPIUM BROMIDE AND ALBUTEROL SULFATE 3 ML: .5; 3 SOLUTION RESPIRATORY (INHALATION) at 19:51

## 2022-01-01 RX ADMIN — FAMOTIDINE 20 MG: 20 TABLET, FILM COATED ORAL at 21:44

## 2022-01-01 RX ADMIN — ENOXAPARIN SODIUM 110 MG: 120 INJECTION, SOLUTION INTRAVENOUS; SUBCUTANEOUS at 12:04

## 2022-01-01 RX ADMIN — DEXAMETHASONE 10 MG: 2 TABLET ORAL at 21:00

## 2022-01-01 RX ADMIN — SODIUM CHLORIDE, PRESERVATIVE FREE 10 ML: 5 INJECTION INTRAVENOUS at 21:00

## 2022-01-01 RX ADMIN — DEXMEDETOMIDINE HYDROCHLORIDE 0.9 MCG/KG/HR: 100 INJECTION, SOLUTION, CONCENTRATE INTRAVENOUS at 03:40

## 2022-01-01 RX ADMIN — CISATRACURIUM BESYLATE 3 MCG/KG/MIN: 200 INJECTION INTRAVENOUS at 16:40

## 2022-01-01 RX ADMIN — REMDESIVIR 100 MG: 100 INJECTION, POWDER, LYOPHILIZED, FOR SOLUTION INTRAVENOUS at 22:13

## 2022-01-01 RX ADMIN — ENOXAPARIN SODIUM 40 MG: 40 INJECTION SUBCUTANEOUS at 03:36

## 2022-01-01 RX ADMIN — CISATRACURIUM BESYLATE 3 MCG/KG/MIN: 200 INJECTION INTRAVENOUS at 03:30

## 2022-01-01 RX ADMIN — CEFEPIME HYDROCHLORIDE 2 G: 2 INJECTION, POWDER, FOR SOLUTION INTRAVENOUS at 23:17

## 2022-01-01 RX ADMIN — DEXAMETHASONE 6 MG: 4 TABLET ORAL at 20:42

## 2022-01-01 RX ADMIN — IPRATROPIUM BROMIDE AND ALBUTEROL SULFATE 3 ML: .5; 3 SOLUTION RESPIRATORY (INHALATION) at 08:03

## 2022-01-01 RX ADMIN — DEXAMETHASONE 10 MG: 2 TABLET ORAL at 23:01

## 2022-01-01 RX ADMIN — SODIUM CHLORIDE, PRESERVATIVE FREE 10 ML: 5 INJECTION INTRAVENOUS at 20:59

## 2022-01-01 RX ADMIN — PRAVASTATIN SODIUM 10 MG: 10 TABLET ORAL at 21:51

## 2022-01-01 RX ADMIN — Medication 0.1 MCG/KG/MIN: at 17:03

## 2022-01-01 RX ADMIN — CALCIUM CHLORIDE, MAGNESIUM CHLORIDE, SODIUM CHLORIDE, SODIUM BICARBONATE, POTASSIUM CHLORIDE AND SODIUM PHOSPHATE DIBASIC DIHYDRATE 1500 ML/HR: 3.68; 3.05; 6.34; 3.09; .314; .187 INJECTION INTRAVENOUS at 04:00

## 2022-01-01 RX ADMIN — DEXMEDETOMIDINE HYDROCHLORIDE 1.2 MCG/KG/HR: 100 INJECTION, SOLUTION, CONCENTRATE INTRAVENOUS at 13:16

## 2022-01-01 RX ADMIN — SODIUM CHLORIDE, PRESERVATIVE FREE 10 ML: 5 INJECTION INTRAVENOUS at 09:37

## 2022-01-01 RX ADMIN — DEXAMETHASONE SODIUM PHOSPHATE 6 MG: 10 INJECTION INTRAMUSCULAR; INTRAVENOUS at 09:06

## 2022-01-01 RX ADMIN — Medication 1000 UNITS: at 08:11

## 2022-01-01 RX ADMIN — ALPRAZOLAM 0.5 MG: 0.5 TABLET ORAL at 00:04

## 2022-01-01 RX ADMIN — Medication 0.12 MCG/KG/MIN: at 20:49

## 2022-01-01 RX ADMIN — IPRATROPIUM BROMIDE AND ALBUTEROL SULFATE 3 ML: .5; 3 SOLUTION RESPIRATORY (INHALATION) at 19:49

## 2022-01-01 RX ADMIN — CEFEPIME HYDROCHLORIDE 2 G: 2 INJECTION, POWDER, FOR SOLUTION INTRAVENOUS at 01:03

## 2022-01-01 RX ADMIN — PRAVASTATIN SODIUM 10 MG: 10 TABLET ORAL at 21:44

## 2022-01-01 RX ADMIN — SODIUM CHLORIDE, PRESERVATIVE FREE 10 ML: 5 INJECTION INTRAVENOUS at 08:56

## 2022-01-01 RX ADMIN — MINERAL OIL AND WHITE PETROLATUM: 150; 830 OINTMENT OPHTHALMIC at 12:22

## 2022-01-01 RX ADMIN — Medication 0.3 MCG/KG/MIN: at 06:49

## 2022-01-01 RX ADMIN — DEXAMETHASONE SODIUM PHOSPHATE 6 MG: 10 INJECTION INTRAMUSCULAR; INTRAVENOUS at 20:59

## 2022-01-01 RX ADMIN — Medication 1000 UNITS: at 09:05

## 2022-01-01 RX ADMIN — IOPAMIDOL 100 ML: 755 INJECTION, SOLUTION INTRAVENOUS at 04:14

## 2022-01-01 RX ADMIN — FUROSEMIDE 40 MG: 10 INJECTION, SOLUTION INTRAVENOUS at 09:04

## 2022-01-01 RX ADMIN — PROPOFOL 30 MCG/KG/MIN: 10 INJECTION, EMULSION INTRAVENOUS at 13:16

## 2022-01-01 RX ADMIN — IPRATROPIUM BROMIDE AND ALBUTEROL SULFATE 3 ML: .5; 3 SOLUTION RESPIRATORY (INHALATION) at 15:13

## 2022-01-01 RX ADMIN — CISATRACURIUM BESYLATE 10100 MCG: 2 INJECTION, SOLUTION INTRAVENOUS at 15:35

## 2022-01-01 RX ADMIN — DEXAMETHASONE SODIUM PHOSPHATE 6 MG: 10 INJECTION INTRAMUSCULAR; INTRAVENOUS at 23:04

## 2022-01-01 RX ADMIN — CALCIUM CHLORIDE, MAGNESIUM CHLORIDE, SODIUM CHLORIDE, SODIUM BICARBONATE, POTASSIUM CHLORIDE AND SODIUM PHOSPHATE DIBASIC DIHYDRATE 1500 ML/HR: 3.68; 3.05; 6.34; 3.09; .314; .187 INJECTION INTRAVENOUS at 12:22

## 2022-01-01 RX ADMIN — PROPOFOL 30 MCG/KG/MIN: 10 INJECTION, EMULSION INTRAVENOUS at 13:25

## 2022-01-01 RX ADMIN — CALCIUM CHLORIDE, MAGNESIUM CHLORIDE, SODIUM CHLORIDE, SODIUM BICARBONATE, POTASSIUM CHLORIDE AND SODIUM PHOSPHATE DIBASIC DIHYDRATE 1500 ML/HR: 3.68; 3.05; 6.34; 3.09; .314; .187 INJECTION INTRAVENOUS at 21:41

## 2022-01-01 RX ADMIN — POLYETHYLENE GLYCOL 3350 17 G: 17 POWDER, FOR SOLUTION ORAL at 09:05

## 2022-01-01 RX ADMIN — IPRATROPIUM BROMIDE AND ALBUTEROL SULFATE 3 ML: .5; 3 SOLUTION RESPIRATORY (INHALATION) at 08:02

## 2022-01-01 RX ADMIN — CEFEPIME HYDROCHLORIDE 2 G: 2 INJECTION, POWDER, FOR SOLUTION INTRAVENOUS at 11:26

## 2022-01-01 RX ADMIN — Medication 0.08 MCG/KG/MIN: at 03:30

## 2022-01-01 RX ADMIN — IPRATROPIUM BROMIDE AND ALBUTEROL SULFATE 3 ML: .5; 3 SOLUTION RESPIRATORY (INHALATION) at 09:03

## 2022-01-01 RX ADMIN — PROPOFOL 30 MCG/KG/MIN: 10 INJECTION, EMULSION INTRAVENOUS at 12:27

## 2022-01-01 RX ADMIN — MINERAL OIL AND WHITE PETROLATUM: 150; 830 OINTMENT OPHTHALMIC at 04:01

## 2022-01-01 RX ADMIN — ALPRAZOLAM 0.5 MG: 0.5 TABLET ORAL at 12:06

## 2022-01-01 RX ADMIN — CALCIUM CHLORIDE, MAGNESIUM CHLORIDE, SODIUM CHLORIDE, SODIUM BICARBONATE, POTASSIUM CHLORIDE AND SODIUM PHOSPHATE DIBASIC DIHYDRATE 1500 ML/HR: 3.68; 3.05; 6.34; 3.09; .314; .187 INJECTION INTRAVENOUS at 01:29

## 2022-01-01 RX ADMIN — DEXAMETHASONE SODIUM PHOSPHATE 6 MG: 10 INJECTION INTRAMUSCULAR; INTRAVENOUS at 10:07

## 2022-01-01 RX ADMIN — IPRATROPIUM BROMIDE AND ALBUTEROL SULFATE 3 ML: .5; 3 SOLUTION RESPIRATORY (INHALATION) at 15:08

## 2022-01-01 RX ADMIN — DEXAMETHASONE SODIUM PHOSPHATE 6 MG: 10 INJECTION, SOLUTION INTRAMUSCULAR; INTRAVENOUS at 20:49

## 2022-01-01 RX ADMIN — PROPOFOL 25 MCG/KG/MIN: 10 INJECTION, EMULSION INTRAVENOUS at 03:15

## 2022-01-01 RX ADMIN — HEPARIN SODIUM 14.6 UNITS/KG/HR: 10000 INJECTION, SOLUTION INTRAVENOUS at 22:57

## 2022-01-01 RX ADMIN — DILTIAZEM HYDROCHLORIDE 20 MG: 5 INJECTION INTRAVENOUS at 13:15

## 2022-01-01 RX ADMIN — SODIUM CHLORIDE, PRESERVATIVE FREE 10 ML: 5 INJECTION INTRAVENOUS at 20:54

## 2022-01-01 RX ADMIN — CISATRACURIUM BESYLATE 3 MCG/KG/MIN: 200 INJECTION INTRAVENOUS at 14:30

## 2022-01-01 RX ADMIN — ALPRAZOLAM 0.5 MG: 0.5 TABLET ORAL at 21:17

## 2022-01-01 RX ADMIN — DEXMEDETOMIDINE HYDROCHLORIDE 1.2 MCG/KG/HR: 100 INJECTION, SOLUTION, CONCENTRATE INTRAVENOUS at 20:58

## 2022-01-01 RX ADMIN — IPRATROPIUM BROMIDE AND ALBUTEROL SULFATE 3 ML: .5; 3 SOLUTION RESPIRATORY (INHALATION) at 11:07

## 2022-01-01 RX ADMIN — DEXAMETHASONE 6 MG: 4 TABLET ORAL at 22:14

## 2022-01-01 RX ADMIN — CEFEPIME HYDROCHLORIDE 2 G: 2 INJECTION, POWDER, FOR SOLUTION INTRAVENOUS at 23:02

## 2022-01-01 RX ADMIN — IPRATROPIUM BROMIDE AND ALBUTEROL SULFATE 3 ML: .5; 3 SOLUTION RESPIRATORY (INHALATION) at 11:55

## 2022-01-01 RX ADMIN — SODIUM CHLORIDE, PRESERVATIVE FREE 10 ML: 5 INJECTION INTRAVENOUS at 09:05

## 2022-01-01 RX ADMIN — ACETAMINOPHEN 650 MG: 325 TABLET, FILM COATED ORAL at 15:33

## 2022-01-01 RX ADMIN — AMIODARONE HYDROCHLORIDE 1 MG/MIN: 1.8 INJECTION, SOLUTION INTRAVENOUS at 15:31

## 2022-01-01 RX ADMIN — CEFEPIME HYDROCHLORIDE 2 G: 2 INJECTION, POWDER, FOR SOLUTION INTRAVENOUS at 23:03

## 2022-01-01 RX ADMIN — DOCUSATE SODIUM 50MG AND SENNOSIDES 8.6MG 2 TABLET: 8.6; 5 TABLET, FILM COATED ORAL at 08:00

## 2022-01-01 RX ADMIN — IPRATROPIUM BROMIDE AND ALBUTEROL SULFATE 3 ML: .5; 3 SOLUTION RESPIRATORY (INHALATION) at 19:55

## 2022-01-01 RX ADMIN — ALPRAZOLAM 0.5 MG: 0.5 TABLET ORAL at 03:22

## 2022-01-01 RX ADMIN — ENOXAPARIN SODIUM 40 MG: 40 INJECTION SUBCUTANEOUS at 23:14

## 2022-01-01 RX ADMIN — IPRATROPIUM BROMIDE AND ALBUTEROL SULFATE 3 ML: .5; 3 SOLUTION RESPIRATORY (INHALATION) at 19:12

## 2022-01-01 RX ADMIN — CEFEPIME HYDROCHLORIDE 2 G: 2 INJECTION, POWDER, FOR SOLUTION INTRAVENOUS at 23:33

## 2022-01-01 RX ADMIN — DEXMEDETOMIDINE HYDROCHLORIDE 1.3 MCG/KG/HR: 100 INJECTION, SOLUTION, CONCENTRATE INTRAVENOUS at 10:02

## 2022-01-01 RX ADMIN — SODIUM CHLORIDE, PRESERVATIVE FREE 10 ML: 5 INJECTION INTRAVENOUS at 20:05

## 2022-01-01 RX ADMIN — DEXAMETHASONE 10 MG: 2 TABLET ORAL at 09:04

## 2022-01-01 RX ADMIN — Medication 1000 UNITS: at 08:37

## 2022-01-01 RX ADMIN — PRAVASTATIN SODIUM 10 MG: 10 TABLET ORAL at 22:14

## 2022-01-01 RX ADMIN — CEFTRIAXONE SODIUM 1 G: 1 INJECTION, POWDER, FOR SOLUTION INTRAMUSCULAR; INTRAVENOUS at 22:30

## 2022-01-01 RX ADMIN — FAMOTIDINE 20 MG: 20 TABLET, FILM COATED ORAL at 08:56

## 2022-01-01 RX ADMIN — IPRATROPIUM BROMIDE AND ALBUTEROL SULFATE 3 ML: .5; 3 SOLUTION RESPIRATORY (INHALATION) at 12:28

## 2022-01-01 RX ADMIN — DEXAMETHASONE 6 MG: 4 TABLET ORAL at 09:32

## 2022-01-01 RX ADMIN — AMIODARONE HYDROCHLORIDE 0.5 MG/MIN: 1.8 INJECTION, SOLUTION INTRAVENOUS at 21:38

## 2022-01-01 RX ADMIN — Medication 1000 UNITS: at 08:14

## 2022-01-01 RX ADMIN — DEXAMETHASONE 10 MG: 2 TABLET ORAL at 21:44

## 2022-01-01 RX ADMIN — PROPOFOL 40 MCG/KG/MIN: 10 INJECTION, EMULSION INTRAVENOUS at 07:12

## 2022-01-01 RX ADMIN — Medication 1000 UNITS: at 08:53

## 2022-01-01 RX ADMIN — MINERAL OIL AND WHITE PETROLATUM: 150; 830 OINTMENT OPHTHALMIC at 20:00

## 2022-01-01 RX ADMIN — HEPARIN SODIUM 11.6 UNITS/KG/HR: 10000 INJECTION, SOLUTION INTRAVENOUS at 18:14

## 2022-01-01 RX ADMIN — PROPOFOL 25 MCG/KG/MIN: 10 INJECTION, EMULSION INTRAVENOUS at 07:42

## 2022-01-01 RX ADMIN — Medication 1000 UNITS: at 10:01

## 2022-01-01 RX ADMIN — PROPOFOL 30 MCG/KG/MIN: 10 INJECTION, EMULSION INTRAVENOUS at 18:14

## 2022-01-01 RX ADMIN — ACETAMINOPHEN 650 MG: 325 TABLET, FILM COATED ORAL at 02:07

## 2022-01-01 RX ADMIN — MINERAL OIL AND WHITE PETROLATUM: 150; 830 OINTMENT OPHTHALMIC at 00:00

## 2022-01-01 RX ADMIN — CALCIUM CHLORIDE, MAGNESIUM CHLORIDE, SODIUM CHLORIDE, SODIUM BICARBONATE, POTASSIUM CHLORIDE AND SODIUM PHOSPHATE DIBASIC DIHYDRATE 1500 ML/HR: 3.68; 3.05; 6.34; 3.09; .314; .187 INJECTION INTRAVENOUS at 23:30

## 2022-01-01 RX ADMIN — FENTANYL CITRATE 150 MCG: 50 INJECTION INTRAMUSCULAR; INTRAVENOUS at 12:25

## 2022-01-01 RX ADMIN — FAMOTIDINE 20 MG: 20 TABLET, FILM COATED ORAL at 08:18

## 2022-01-01 RX ADMIN — CEFEPIME HYDROCHLORIDE 2 G: 2 INJECTION, POWDER, FOR SOLUTION INTRAVENOUS at 06:07

## 2022-01-01 RX ADMIN — DEXMEDETOMIDINE HYDROCHLORIDE 1.4 MCG/KG/HR: 100 INJECTION, SOLUTION, CONCENTRATE INTRAVENOUS at 12:28

## 2022-01-01 RX ADMIN — ENOXAPARIN SODIUM 110 MG: 120 INJECTION, SOLUTION INTRAVENOUS; SUBCUTANEOUS at 22:45

## 2022-01-01 RX ADMIN — FAMOTIDINE 20 MG: 20 TABLET, FILM COATED ORAL at 08:36

## 2022-01-01 RX ADMIN — DEXAMETHASONE 6 MG: 4 TABLET ORAL at 11:21

## 2022-01-01 RX ADMIN — IPRATROPIUM BROMIDE AND ALBUTEROL SULFATE 3 ML: .5; 3 SOLUTION RESPIRATORY (INHALATION) at 20:46

## 2022-01-01 RX ADMIN — CALCIUM CHLORIDE, MAGNESIUM CHLORIDE, SODIUM CHLORIDE, SODIUM BICARBONATE, POTASSIUM CHLORIDE AND SODIUM PHOSPHATE DIBASIC DIHYDRATE 1500 ML/HR: 3.68; 3.05; 6.34; 3.09; .314; .187 INJECTION INTRAVENOUS at 13:50

## 2022-01-01 RX ADMIN — IPRATROPIUM BROMIDE AND ALBUTEROL SULFATE 3 ML: .5; 3 SOLUTION RESPIRATORY (INHALATION) at 15:50

## 2022-01-01 RX ADMIN — SODIUM CHLORIDE, PRESERVATIVE FREE 10 ML: 5 INJECTION INTRAVENOUS at 08:01

## 2022-01-01 RX ADMIN — SODIUM CHLORIDE, PRESERVATIVE FREE 10 ML: 5 INJECTION INTRAVENOUS at 09:04

## 2022-01-01 RX ADMIN — MINERAL OIL AND WHITE PETROLATUM: 150; 830 OINTMENT OPHTHALMIC at 12:52

## 2022-01-01 RX ADMIN — CALCIUM CHLORIDE, MAGNESIUM CHLORIDE, SODIUM CHLORIDE, SODIUM BICARBONATE, POTASSIUM CHLORIDE AND SODIUM PHOSPHATE DIBASIC DIHYDRATE 1500 ML/HR: 3.68; 3.05; 6.34; 3.09; .314; .187 INJECTION INTRAVENOUS at 19:43

## 2022-01-01 RX ADMIN — DOCUSATE SODIUM 50MG AND SENNOSIDES 8.6MG 2 TABLET: 8.6; 5 TABLET, FILM COATED ORAL at 20:05

## 2022-01-01 RX ADMIN — SODIUM CHLORIDE, PRESERVATIVE FREE 10 ML: 5 INJECTION INTRAVENOUS at 08:37

## 2022-01-01 RX ADMIN — PROPOFOL 30 MCG/KG/MIN: 10 INJECTION, EMULSION INTRAVENOUS at 07:40

## 2022-01-01 RX ADMIN — IPRATROPIUM BROMIDE AND ALBUTEROL SULFATE 3 ML: .5; 3 SOLUTION RESPIRATORY (INHALATION) at 11:34

## 2022-01-01 RX ADMIN — HEPARIN SODIUM 1000 UNITS: 1000 INJECTION INTRAVENOUS; SUBCUTANEOUS at 10:27

## 2022-01-01 RX ADMIN — SODIUM CHLORIDE 500 ML: 9 INJECTION, SOLUTION INTRAVENOUS at 13:11

## 2022-01-01 RX ADMIN — DEXAMETHASONE 10 MG: 2 TABLET ORAL at 08:56

## 2022-01-01 RX ADMIN — ENOXAPARIN SODIUM 110 MG: 120 INJECTION, SOLUTION INTRAVENOUS; SUBCUTANEOUS at 11:58

## 2022-01-01 RX ADMIN — CALCIUM CHLORIDE, MAGNESIUM CHLORIDE, SODIUM CHLORIDE, SODIUM BICARBONATE, POTASSIUM CHLORIDE AND SODIUM PHOSPHATE DIBASIC DIHYDRATE 1500 ML/HR: 3.68; 3.05; 6.34; 3.09; .314; .187 INJECTION INTRAVENOUS at 01:30

## 2022-01-01 RX ADMIN — ENOXAPARIN SODIUM 110 MG: 120 INJECTION, SOLUTION INTRAVENOUS; SUBCUTANEOUS at 23:00

## 2022-01-01 RX ADMIN — VASOPRESSIN 0.03 UNITS/MIN: 20 INJECTION INTRAVENOUS at 05:52

## 2022-01-01 RX ADMIN — VANCOMYCIN HYDROCHLORIDE 2000 MG: 10 INJECTION, POWDER, LYOPHILIZED, FOR SOLUTION INTRAVENOUS at 12:56

## 2022-01-01 RX ADMIN — DEXAMETHASONE SODIUM PHOSPHATE 6 MG: 10 INJECTION INTRAMUSCULAR; INTRAVENOUS at 15:51

## 2022-01-01 RX ADMIN — IPRATROPIUM BROMIDE AND ALBUTEROL SULFATE 3 ML: .5; 3 SOLUTION RESPIRATORY (INHALATION) at 17:01

## 2022-01-01 RX ADMIN — CEFEPIME HYDROCHLORIDE 2 G: 2 INJECTION, POWDER, FOR SOLUTION INTRAVENOUS at 09:16

## 2022-01-01 RX ADMIN — CALCIUM CHLORIDE, MAGNESIUM CHLORIDE, SODIUM CHLORIDE, SODIUM BICARBONATE, POTASSIUM CHLORIDE AND SODIUM PHOSPHATE DIBASIC DIHYDRATE 1500 ML/HR: 3.68; 3.05; 6.34; 3.09; .314; .187 INJECTION INTRAVENOUS at 04:10

## 2022-01-01 RX ADMIN — FENTANYL CITRATE 50 MCG/HR: 0.05 INJECTION, SOLUTION INTRAMUSCULAR; INTRAVENOUS at 16:43

## 2022-01-01 RX ADMIN — MINERAL OIL AND WHITE PETROLATUM: 150; 830 OINTMENT OPHTHALMIC at 15:56

## 2022-01-01 RX ADMIN — ENOXAPARIN SODIUM 100 MG: 100 INJECTION, SOLUTION INTRAVENOUS; SUBCUTANEOUS at 21:00

## 2022-01-01 RX ADMIN — FUROSEMIDE 40 MG: 10 INJECTION, SOLUTION INTRAVENOUS at 15:24

## 2022-01-01 RX ADMIN — SODIUM CHLORIDE, PRESERVATIVE FREE 10 ML: 5 INJECTION INTRAVENOUS at 08:17

## 2022-01-01 RX ADMIN — DEXAMETHASONE SODIUM PHOSPHATE 6 MG: 10 INJECTION INTRAMUSCULAR; INTRAVENOUS at 08:01

## 2022-01-01 RX ADMIN — CEFEPIME HYDROCHLORIDE 2 G: 2 INJECTION, POWDER, FOR SOLUTION INTRAVENOUS at 08:18

## 2022-01-01 RX ADMIN — IPRATROPIUM BROMIDE AND ALBUTEROL SULFATE 3 ML: .5; 3 SOLUTION RESPIRATORY (INHALATION) at 11:33

## 2022-01-01 RX ADMIN — Medication 1000 UNITS: at 08:01

## 2022-01-01 RX ADMIN — AMIODARONE HYDROCHLORIDE 0.5 MG/MIN: 1.8 INJECTION, SOLUTION INTRAVENOUS at 07:42

## 2022-01-01 RX ADMIN — CEFEPIME HYDROCHLORIDE 2 G: 2 INJECTION, POWDER, FOR SOLUTION INTRAVENOUS at 00:04

## 2022-01-01 RX ADMIN — FENTANYL CITRATE 50 MCG/HR: 0.05 INJECTION, SOLUTION INTRAMUSCULAR; INTRAVENOUS at 21:02

## 2022-01-01 RX ADMIN — ENOXAPARIN SODIUM 40 MG: 40 INJECTION SUBCUTANEOUS at 17:04

## 2022-01-01 RX ADMIN — FAMOTIDINE 20 MG: 20 TABLET, FILM COATED ORAL at 09:32

## 2022-01-01 RX ADMIN — Medication 1000 UNITS: at 08:56

## 2022-01-01 RX ADMIN — PROPOFOL 40 MCG/KG/MIN: 10 INJECTION, EMULSION INTRAVENOUS at 20:58

## 2022-01-01 RX ADMIN — PRAVASTATIN SODIUM 10 MG: 10 TABLET ORAL at 20:59

## 2022-01-01 RX ADMIN — DEXMEDETOMIDINE HYDROCHLORIDE 0.9 MCG/KG/HR: 100 INJECTION, SOLUTION, CONCENTRATE INTRAVENOUS at 23:03

## 2022-01-01 RX ADMIN — CALCIUM CHLORIDE, MAGNESIUM CHLORIDE, SODIUM CHLORIDE, SODIUM BICARBONATE, POTASSIUM CHLORIDE AND SODIUM PHOSPHATE DIBASIC DIHYDRATE 1500 ML/HR: 3.68; 3.05; 6.34; 3.09; .314; .187 INJECTION INTRAVENOUS at 04:51

## 2022-01-01 RX ADMIN — ALPRAZOLAM 0.5 MG: 0.5 TABLET ORAL at 09:05

## 2022-01-01 RX ADMIN — FAMOTIDINE 20 MG: 10 INJECTION INTRAVENOUS at 10:08

## 2022-01-01 RX ADMIN — CALCIUM CHLORIDE, MAGNESIUM CHLORIDE, SODIUM CHLORIDE, SODIUM BICARBONATE, POTASSIUM CHLORIDE AND SODIUM PHOSPHATE DIBASIC DIHYDRATE 1500 ML/HR: 3.68; 3.05; 6.34; 3.09; .314; .187 INJECTION INTRAVENOUS at 21:42

## 2022-01-01 RX ADMIN — DEXAMETHASONE 6 MG: 4 TABLET ORAL at 08:14

## 2022-01-01 RX ADMIN — ALPRAZOLAM 0.5 MG: 0.5 TABLET ORAL at 17:54

## 2022-01-01 RX ADMIN — IPRATROPIUM BROMIDE AND ALBUTEROL SULFATE 3 ML: .5; 3 SOLUTION RESPIRATORY (INHALATION) at 14:53

## 2022-01-01 RX ADMIN — DEXMEDETOMIDINE HYDROCHLORIDE 1.2 MCG/KG/HR: 100 INJECTION, SOLUTION, CONCENTRATE INTRAVENOUS at 00:45

## 2022-01-01 RX ADMIN — FENTANYL CITRATE 50 MCG/HR: 0.05 INJECTION, SOLUTION INTRAMUSCULAR; INTRAVENOUS at 15:56

## 2022-01-01 RX ADMIN — CEFEPIME HYDROCHLORIDE 2 G: 2 INJECTION, POWDER, FOR SOLUTION INTRAVENOUS at 22:05

## 2022-01-01 RX ADMIN — ENOXAPARIN SODIUM 40 MG: 40 INJECTION SUBCUTANEOUS at 22:13

## 2022-01-01 RX ADMIN — SODIUM CHLORIDE, POTASSIUM CHLORIDE, SODIUM LACTATE AND CALCIUM CHLORIDE 1000 ML: 600; 310; 30; 20 INJECTION, SOLUTION INTRAVENOUS at 17:04

## 2022-01-01 RX ADMIN — SODIUM CHLORIDE, PRESERVATIVE FREE 10 ML: 5 INJECTION INTRAVENOUS at 21:46

## 2022-01-01 RX ADMIN — ENOXAPARIN SODIUM 110 MG: 120 INJECTION, SOLUTION INTRAVENOUS; SUBCUTANEOUS at 23:01

## 2022-01-01 RX ADMIN — CEFEPIME HYDROCHLORIDE 2 G: 2 INJECTION, POWDER, FOR SOLUTION INTRAVENOUS at 15:56

## 2022-01-01 RX ADMIN — MINERAL OIL AND WHITE PETROLATUM: 150; 830 OINTMENT OPHTHALMIC at 20:55

## 2022-01-01 RX ADMIN — PROPOFOL 50 MCG/KG/MIN: 10 INJECTION, EMULSION INTRAVENOUS at 15:37

## 2022-01-01 RX ADMIN — DEXAMETHASONE 6 MG: 4 TABLET ORAL at 08:37

## 2022-01-01 RX ADMIN — CEFEPIME HYDROCHLORIDE 2 G: 2 INJECTION, POWDER, FOR SOLUTION INTRAVENOUS at 15:24

## 2022-01-01 RX ADMIN — ALPRAZOLAM 0.5 MG: 0.5 TABLET ORAL at 21:45

## 2022-01-01 RX ADMIN — REMDESIVIR 100 MG: 100 INJECTION, POWDER, LYOPHILIZED, FOR SOLUTION INTRAVENOUS at 21:51

## 2022-01-01 RX ADMIN — CALCIUM CHLORIDE, MAGNESIUM CHLORIDE, SODIUM CHLORIDE, SODIUM BICARBONATE, POTASSIUM CHLORIDE AND SODIUM PHOSPHATE DIBASIC DIHYDRATE 1500 ML/HR: 3.68; 3.05; 6.34; 3.09; .314; .187 INJECTION INTRAVENOUS at 03:50

## 2022-01-01 RX ADMIN — CALCIUM CHLORIDE, MAGNESIUM CHLORIDE, SODIUM CHLORIDE, SODIUM BICARBONATE, POTASSIUM CHLORIDE AND SODIUM PHOSPHATE DIBASIC DIHYDRATE 1500 ML/HR: 3.68; 3.05; 6.34; 3.09; .314; .187 INJECTION INTRAVENOUS at 19:45

## 2022-01-01 RX ADMIN — CISATRACURIUM BESYLATE 3 MCG/KG/MIN: 200 INJECTION INTRAVENOUS at 16:31

## 2022-01-01 RX ADMIN — MINERAL OIL AND WHITE PETROLATUM: 150; 830 OINTMENT OPHTHALMIC at 00:12

## 2022-01-01 RX ADMIN — FAMOTIDINE 20 MG: 10 INJECTION INTRAVENOUS at 08:01

## 2022-01-01 RX ADMIN — IPRATROPIUM BROMIDE AND ALBUTEROL SULFATE 3 ML: .5; 3 SOLUTION RESPIRATORY (INHALATION) at 06:56

## 2022-01-01 RX ADMIN — FAMOTIDINE 20 MG: 20 TABLET, FILM COATED ORAL at 08:14

## 2022-01-01 RX ADMIN — ALPRAZOLAM 0.5 MG: 0.5 TABLET ORAL at 23:00

## 2022-01-01 RX ADMIN — DOCUSATE SODIUM 50MG AND SENNOSIDES 8.6MG 2 TABLET: 8.6; 5 TABLET, FILM COATED ORAL at 20:59

## 2022-01-01 RX ADMIN — POLYETHYLENE GLYCOL 3350 17 G: 17 POWDER, FOR SOLUTION ORAL at 08:01

## 2022-01-01 RX ADMIN — ENOXAPARIN SODIUM 40 MG: 40 INJECTION SUBCUTANEOUS at 17:59

## 2022-01-01 RX ADMIN — CEFTRIAXONE 1 G: 1 INJECTION, POWDER, FOR SOLUTION INTRAMUSCULAR; INTRAVENOUS at 23:14

## 2022-01-01 RX ADMIN — DEXAMETHASONE SODIUM PHOSPHATE 6 MG: 10 INJECTION INTRAMUSCULAR; INTRAVENOUS at 20:05

## 2022-01-01 RX ADMIN — PRAVASTATIN SODIUM 10 MG: 10 TABLET ORAL at 20:05

## 2022-01-01 RX ADMIN — MINERAL OIL AND WHITE PETROLATUM: 150; 830 OINTMENT OPHTHALMIC at 19:55

## 2022-01-01 RX ADMIN — ALPRAZOLAM 0.5 MG: 0.5 TABLET ORAL at 16:32

## 2022-01-01 RX ADMIN — DOCUSATE SODIUM 50MG AND SENNOSIDES 8.6MG 2 TABLET: 8.6; 5 TABLET, FILM COATED ORAL at 09:05

## 2022-01-01 RX ADMIN — REMDESIVIR 100 MG: 100 INJECTION, POWDER, LYOPHILIZED, FOR SOLUTION INTRAVENOUS at 00:09

## 2022-01-01 RX ADMIN — ENOXAPARIN SODIUM 110 MG: 120 INJECTION, SOLUTION INTRAVENOUS; SUBCUTANEOUS at 11:02

## 2022-01-01 RX ADMIN — MINERAL OIL AND WHITE PETROLATUM: 150; 830 OINTMENT OPHTHALMIC at 04:09

## 2022-01-01 RX ADMIN — PHENYLEPHRINE HYDROCHLORIDE 3 MCG/KG/MIN: 10 INJECTION INTRAVENOUS at 10:55

## 2022-01-01 RX ADMIN — DEXMEDETOMIDINE HYDROCHLORIDE 1.2 MCG/KG/HR: 100 INJECTION, SOLUTION, CONCENTRATE INTRAVENOUS at 17:12

## 2022-01-01 RX ADMIN — DEXMEDETOMIDINE HYDROCHLORIDE 0.2 MCG/KG/HR: 100 INJECTION, SOLUTION, CONCENTRATE INTRAVENOUS at 02:25

## 2022-01-01 RX ADMIN — MINERAL OIL AND WHITE PETROLATUM: 150; 830 OINTMENT OPHTHALMIC at 17:04

## 2022-01-01 RX ADMIN — CEFEPIME HYDROCHLORIDE 2 G: 2 INJECTION, POWDER, FOR SOLUTION INTRAVENOUS at 08:11

## 2022-01-01 RX ADMIN — CEFEPIME HYDROCHLORIDE 2 G: 2 INJECTION, POWDER, FOR SOLUTION INTRAVENOUS at 12:15

## 2022-01-01 RX ADMIN — MINERAL OIL AND WHITE PETROLATUM: 150; 830 OINTMENT OPHTHALMIC at 08:01

## 2022-01-01 RX ADMIN — CEFEPIME HYDROCHLORIDE 2 G: 2 INJECTION, POWDER, FOR SOLUTION INTRAVENOUS at 15:29

## 2022-01-01 RX ADMIN — PRAVASTATIN SODIUM 10 MG: 10 TABLET ORAL at 20:44

## 2022-01-01 RX ADMIN — IPRATROPIUM BROMIDE AND ALBUTEROL SULFATE 3 ML: .5; 3 SOLUTION RESPIRATORY (INHALATION) at 16:04

## 2022-01-01 RX ADMIN — ENOXAPARIN SODIUM 40 MG: 40 INJECTION SUBCUTANEOUS at 02:15

## 2022-01-01 RX ADMIN — FAMOTIDINE 20 MG: 20 TABLET, FILM COATED ORAL at 13:38

## 2022-01-01 RX ADMIN — DEXMEDETOMIDINE HYDROCHLORIDE 1.2 MCG/KG/HR: 100 INJECTION, SOLUTION, CONCENTRATE INTRAVENOUS at 05:20

## 2022-01-01 RX ADMIN — FAMOTIDINE 20 MG: 20 TABLET, FILM COATED ORAL at 20:49

## 2022-01-01 RX ADMIN — VANCOMYCIN HYDROCHLORIDE 1000 MG: 1 INJECTION, SOLUTION INTRAVENOUS at 09:05

## 2022-01-01 RX ADMIN — DEXAMETHASONE 6 MG: 4 TABLET ORAL at 20:15

## 2022-01-01 RX ADMIN — FAMOTIDINE 20 MG: 10 INJECTION INTRAVENOUS at 09:06

## 2022-01-01 RX ADMIN — CALCIUM CHLORIDE, MAGNESIUM CHLORIDE, SODIUM CHLORIDE, SODIUM BICARBONATE, POTASSIUM CHLORIDE AND SODIUM PHOSPHATE DIBASIC DIHYDRATE 1500 ML/HR: 3.68; 3.05; 6.34; 3.09; .314; .187 INJECTION INTRAVENOUS at 04:52

## 2022-01-01 RX ADMIN — CEFEPIME HYDROCHLORIDE 2 G: 2 INJECTION, POWDER, FOR SOLUTION INTRAVENOUS at 09:31

## 2022-01-01 RX ADMIN — DEXMEDETOMIDINE HYDROCHLORIDE 0.7 MCG/KG/HR: 100 INJECTION, SOLUTION, CONCENTRATE INTRAVENOUS at 07:41

## 2022-01-01 RX ADMIN — SODIUM CHLORIDE, PRESERVATIVE FREE 10 ML: 5 INJECTION INTRAVENOUS at 08:14

## 2022-01-01 RX ADMIN — PRAVASTATIN SODIUM 10 MG: 10 TABLET ORAL at 20:15

## 2022-01-01 RX ADMIN — FUROSEMIDE 40 MG: 10 INJECTION, SOLUTION INTRAVENOUS at 12:48

## 2022-01-01 RX ADMIN — FAMOTIDINE 20 MG: 20 TABLET, FILM COATED ORAL at 09:16

## 2022-01-01 RX ADMIN — HEPARIN SODIUM 8200 UNITS: 5000 INJECTION INTRAVENOUS; SUBCUTANEOUS at 22:53

## 2022-01-01 RX ADMIN — IPRATROPIUM BROMIDE AND ALBUTEROL SULFATE 3 ML: .5; 3 SOLUTION RESPIRATORY (INHALATION) at 07:30

## 2022-01-01 RX ADMIN — AMIODARONE HYDROCHLORIDE 150 MG: 1.5 INJECTION, SOLUTION INTRAVENOUS at 15:10

## 2022-01-01 RX ADMIN — MINERAL OIL AND WHITE PETROLATUM: 150; 830 OINTMENT OPHTHALMIC at 03:57

## 2022-01-01 RX ADMIN — DEXMEDETOMIDINE HYDROCHLORIDE 1.4 MCG/KG/HR: 100 INJECTION, SOLUTION, CONCENTRATE INTRAVENOUS at 15:37

## 2022-01-01 RX ADMIN — DEXAMETHASONE 10 MG: 2 TABLET ORAL at 20:49

## 2022-01-01 RX ADMIN — ALPRAZOLAM 0.5 MG: 0.5 TABLET ORAL at 13:39

## 2022-01-01 RX ADMIN — FAMOTIDINE 20 MG: 10 INJECTION INTRAVENOUS at 21:03

## 2022-01-01 RX ADMIN — DOCUSATE SODIUM 50MG AND SENNOSIDES 8.6MG 2 TABLET: 8.6; 5 TABLET, FILM COATED ORAL at 10:01

## 2022-01-01 RX ADMIN — PHENYLEPHRINE HYDROCHLORIDE 0.5 MCG/KG/MIN: 10 INJECTION INTRAVENOUS at 06:36

## 2022-01-01 RX ADMIN — Medication 0.06 MCG/KG/MIN: at 05:30

## 2022-01-01 RX ADMIN — Medication 3 MG: at 23:02

## 2022-01-01 RX ADMIN — CISATRACURIUM BESYLATE 3 MCG/KG/MIN: 200 INJECTION INTRAVENOUS at 03:00

## 2022-01-01 RX ADMIN — PRAVASTATIN SODIUM 10 MG: 10 TABLET ORAL at 20:49

## 2022-01-01 RX ADMIN — CALCIUM CHLORIDE, MAGNESIUM CHLORIDE, SODIUM CHLORIDE, SODIUM BICARBONATE, POTASSIUM CHLORIDE AND SODIUM PHOSPHATE DIBASIC DIHYDRATE 1500 ML/HR: 3.68; 3.05; 6.34; 3.09; .314; .187 INJECTION INTRAVENOUS at 13:49

## 2022-01-01 RX ADMIN — CEFEPIME HYDROCHLORIDE 2 G: 2 INJECTION, POWDER, FOR SOLUTION INTRAVENOUS at 16:23

## 2022-01-01 RX ADMIN — Medication 3 MG: at 20:43

## 2022-01-01 RX ADMIN — MINERAL OIL AND WHITE PETROLATUM: 150; 830 OINTMENT OPHTHALMIC at 08:48

## 2022-01-01 RX ADMIN — SODIUM CHLORIDE, PRESERVATIVE FREE 10 ML: 5 INJECTION INTRAVENOUS at 23:05

## 2022-01-01 RX ADMIN — REMDESIVIR 200 MG: 100 INJECTION, POWDER, LYOPHILIZED, FOR SOLUTION INTRAVENOUS at 01:49

## 2022-01-01 RX ADMIN — ENOXAPARIN SODIUM 110 MG: 120 INJECTION, SOLUTION INTRAVENOUS; SUBCUTANEOUS at 23:05

## 2022-01-01 RX ADMIN — IPRATROPIUM BROMIDE AND ALBUTEROL SULFATE 3 ML: .5; 3 SOLUTION RESPIRATORY (INHALATION) at 15:17

## 2022-01-01 RX ADMIN — DEXAMETHASONE 6 MG: 4 TABLET ORAL at 21:51

## 2022-01-01 RX ADMIN — Medication 1000 UNITS: at 11:20

## 2022-01-01 RX ADMIN — ENOXAPARIN SODIUM 40 MG: 40 INJECTION SUBCUTANEOUS at 04:08

## 2022-01-01 RX ADMIN — DEXAMETHASONE 6 MG: 4 TABLET ORAL at 08:18

## 2022-01-01 RX ADMIN — DEXAMETHASONE 10 MG: 2 TABLET ORAL at 08:11

## 2022-01-02 PROBLEM — J96.01 ACUTE RESPIRATORY FAILURE WITH HYPOXIA AND HYPERCAPNIA (HCC): Status: ACTIVE | Noted: 2022-01-01

## 2022-01-02 PROBLEM — J96.02 ACUTE RESPIRATORY FAILURE WITH HYPOXIA AND HYPERCAPNIA (HCC): Status: ACTIVE | Noted: 2022-01-01

## 2022-01-02 PROBLEM — J12.82 PNEUMONIA DUE TO COVID-19 VIRUS: Status: ACTIVE | Noted: 2022-01-01

## 2022-01-02 PROBLEM — U07.1 PNEUMONIA DUE TO COVID-19 VIRUS: Status: ACTIVE | Noted: 2022-01-01

## 2022-01-02 PROBLEM — R74.8 ELEVATED LIVER ENZYMES: Status: ACTIVE | Noted: 2022-01-01

## 2022-01-03 PROBLEM — E87.20 METABOLIC ACIDOSIS: Status: RESOLVED | Noted: 2022-01-01 | Resolved: 2022-01-01

## 2022-01-03 PROBLEM — T50.905A MEDICATION REACTION, INITIAL ENCOUNTER: Status: ACTIVE | Noted: 2022-01-01

## 2022-01-03 PROBLEM — R94.31 ABNORMAL EKG: Status: ACTIVE | Noted: 2022-01-01

## 2022-01-03 PROBLEM — G47.33 OSA (OBSTRUCTIVE SLEEP APNEA): Status: ACTIVE | Noted: 2022-01-01

## 2022-01-03 PROBLEM — E66.01 CLASS 2 SEVERE OBESITY WITH SERIOUS COMORBIDITY IN ADULT: Status: ACTIVE | Noted: 2022-01-01

## 2022-01-03 PROBLEM — B17.9 ACUTE VIRAL HEPATITIS: Status: ACTIVE | Noted: 2022-01-01

## 2022-01-03 PROBLEM — D72.810 LYMPHOCYTOPENIA: Status: ACTIVE | Noted: 2022-01-01

## 2022-01-03 PROBLEM — J15.9 BACTERIAL PNEUMONIA: Status: ACTIVE | Noted: 2022-01-01

## 2022-01-03 PROBLEM — E87.20 METABOLIC ACIDOSIS: Status: ACTIVE | Noted: 2022-01-01

## 2022-01-03 NOTE — PROGRESS NOTES
[facility] Medicine Services  PROGRESS NOTE    Patient Name: Renaldo Rodriguez  : 1965  MRN: [id number]    Date of Admission: 2022  Primary Care Physician: Provider, No Known    Subjective   Subjective     CC:  Follow-up Covid pneumonia    HPI:  Patient feels severely short of breath.  He notes he gets very short of breath with very limited movement.  He notes a significant productive cough.  Speaking also makes him short of breath.  He agrees with plan for dexamethasone steroid therapy and remdesivir antiviral therapy.  He notes again he is unvaccinated.  He reports he was taking ivermectin at home to try and cure his Covid.  He feels exceptionally weak and has poor appetite    Review of Systems  No current headache or lightheadedness  No current nausea, vomiting, or diarrhea  No current chest pain or palpitations      Objective   Objective     Vital Signs:   Temp:  [100 °F (37.8 °C)] 100 °F (37.8 °C)  Heart Rate:  [59-99] 65  Resp:  [18-22] 18  BP: (103-141)/(74-85) 125/78        Physical Exam:  Constitutional:Awake, alert, ill-appearing  HENT: NCAT, mucous membranes moist, neck supple  Respiratory: Coarse respiratory sounds, cough is noted, breathing is labored, tachypneic, currently on high oxygen needs  Cardiovascular: RRR, normal radial pulses  Gastrointestinal: Positive bowel sounds, soft, nontender, nondistended  Musculoskeletal: Significantly obese, BMI is 36, minimal lower extremity edema  Psychiatric: Anxious affect, cooperative, conversational  Neurologic: No slurred speech or facial droop, follows commands  Skin: No rashes or jaundice, warm      Results Reviewed:  Results from last 7 days   Lab Units 22  02022   WBC 10*3/mm3 8.23 10.20   HEMOGLOBIN g/dL 12.2* 14.2   HEMATOCRIT % 36.4* 40.9   PLATELETS 10*3/mm3 292 329   PROCALCITONIN ng/mL  --  2.38*     Results from last 7 days   Lab Units 22  0207 22   SODIUM mmol/L 135* 136   POTASSIUM mmol/L 3.8  3.9   CHLORIDE mmol/L 100 96*   CO2 mmol/L 22.2 19.8*   BUN mg/dL 17 15   CREATININE mg/dL 1.18 1.17   GLUCOSE mg/dL 133* 128*   CALCIUM mg/dL 8.4* 8.6   ALT (SGPT) U/L 38 46*  46*   AST (SGOT) U/L 54* 72*  69*   TROPONIN T ng/mL <0.010 <0.010   PROBNP pg/mL  --  355.9     Estimated Creatinine Clearance: 84.7 mL/min (by C-G formula based on SCr of 1.18 mg/dL).    Microbiology Results Abnormal     None          Imaging Results (Last 24 Hours)     Procedure Component Value Units Date/Time    CT Angiogram Chest With & Without Contrast [[id number]] Collected: 01/03/22 0746     Updated: 01/03/22 0746    Narrative:      EXAMINATION: CT ANGIOGRAM OF THE CHEST WITH CONTRAST     HISTORY: 56-year-old male with a history of shortness of air and COVID  pneumonia, rule out pulmonary embolism.     TECHNIQUE: Contiguous axial images were obtained through the chest  following bolus intravenous administration of nonionic contrast. Three-D  reformatted images were produced and presented for interpretation.     COMPARISON: Chest x-ray, 01/02/2022.     FINDINGS: No filling defects are seen within the pulmonary arterial  system to the level of the subsegmental pulmonary arteries. The RV to LV  ratio is less than 1. There are groundglass opacities mixed with areas  of patchy consolidation that are seen throughout the bilateral upper  lobes, right middle lobe, lingula and bilateral lower lobes. There are  some areas of normal aeration seen in the right upper lobe, right middle  lobe and right lower lobe and to a lesser degree the left upper lobe and  left lower lobe. No evidence for pleural effusion is appreciated. No  evidence for mediastinal or hilar adenopathy is appreciated. The  visualized soft tissue structures of the upper abdomen appear normal.  Mild colonic diverticulosis is noted.       Impression:      1. There is no evidence for pulmonary embolism.  2. There are multilobar diffuse bilateral patchy groundglass  and  consolidative infiltrates seen involving a portion of all lobes of both  lungs. This is consistent with diffuse multilobar bilateral  pneumonia/COVID pneumonia.     Radiation dose reduction techniques were utilized, including automated  exposure control and exposure modulation based on body size.          XR Chest 1 View [[id number]] Collected: 01/02/22 2102     Updated: 01/02/22 2106    Narrative:      CHEST SINGLE VIEW     HISTORY: COVID-19. Shortness of air.     COMPARISON: Two-view chest 12/31/2021.     FINDINGS: There are patchy bilateral areas of increased interstitial and  hazy airspace disease consistent with COVID-19 infiltrates. Infiltrates  appear somewhat more dense and confluent as compared to the previous  exam though without evidence for progression in distribution. No  perihilar edema or pneumothorax or pneumomediastinum is evident. Cardiac  monitoring leads are noted. The heart size is borderline enlarged.       Impression:      Patchy bilateral pulmonary infiltrates with mild increase in  density and consolidation when compared to the previous exam. No other  change.     This report was finalized on 1/2/2022 9:03 PM by Dr. Chidi Ruano M.D.                 I have reviewed the medications:  Scheduled Meds:cefTRIAXone, 1 g, Intravenous, Q24H  cholecalciferol, 1,000 Units, Oral, Daily  dexamethasone, 6 mg, Oral, Q12H  enoxaparin, 40 mg, Subcutaneous, Q24H  famotidine, 20 mg, Oral, Daily  remdesivir, 100 mg, Intravenous, Q24H      Continuous Infusions:   PRN Meds:.•  acetaminophen  •  melatonin  •  nitroglycerin  •  ondansetron **OR** ondansetron  •  sodium chloride    Assessment/Plan   Assessment & Plan     Active Hospital Problems    Diagnosis  POA   • **Pneumonia due to COVID-19 virus [U07.1, J12.82]  Yes   • Lymphocytopenia [D72.810]  Yes   • Acute transaminitis due to COVID-19 viral infection [B17.9]  Yes   • Class 2 severe obesity with serious comorbidity in adult (HCC) [E66.01]  Yes   •  JOSHUA (obstructive sleep apnea) [G47.33]  Yes   • Bacterial pneumonia [J15.9]  Yes   • Abnormal EKG [R94.31]  Yes   • Acute respiratory failure with hypoxia and hypercapnia (HCC) [J96.01, J96.02]  Unknown   • Elevated liver enzymes [R74.8]  Yes   • Neuritis or radiculitis due to rupture of lumbar intervertebral disc [M51.16]  Yes      Resolved Hospital Problems    Diagnosis Date Resolved POA   • Metabolic acidosis [E87.2] 01/03/2022 Yes        Brief Hospital Course to date:  Renaldo Rodriguez is a 56 y.o. male presents to the hospital with COVID-19 pneumonia and secondary bacterial pneumonia.  Patient had been attempting to treat himself at home with equine agricultural ivermectin.    COVID-19 pneumonia with secondary respiratory failure, lymphocytopenia, transaminitis and bacterial pneumonia:  Aggressive treatment for COVID-19 with dexamethasone and remdesivir.  Discontinue ivermectin.  Encourage self proning, advance oxygen to [employer].  May need BiPAP if worsens.  Discussed CODE STATUS with patient who adamantly says at this point he does not want to go on mechanical ventilation.  His CODE STATUS has been updated however I recommend he discuss this with his wife.  If patient worsens I recommend discussing further.  I updated his wife of his CODE STATUS preference and she plans to discuss with him further as well.  Lymphocytopenia is common with COVID-19.  Monitor.  Transaminitis likely related to Covid associated viral hepatitis.  Trend.  Supportive care and symptom treatment.  Flutter valve and incentive spirometer.  Ceftriaxone for bacterial pneumonia.  Trend inflammatory markers.  CT angiogram negative for pulmonary embolus.  GI prophylaxis with H2 blocker.    Obstructive sleep apnea: Further complicates issue.  Supplemental oxygen and pulse oximetry monitoring.  Patient has never been on CPAP.    Abnormal EKG:  EKG appears to have some ST depression in lateral leads.  There is also atrial enlargement noted.   Trend troponin.  Consult cardiology.  Patient may need ischemic work-up at some point if he recovers from his acute illness.    Obesity:  Further complicates illness.  Supportive care and symptom treatment.  Eventual weight loss recommended.    History of lumbar degenerative disc disease and neuropathic pain:  No longer taking gabapentin.  Tylenol as needed for now.    Metabolic acidosis: Noted on admission.  Monitor, improved..    DVT Prophylaxis: Lovenox    Disposition: Pending course    CODE STATUS:   Code Status and Medical Interventions:   Ordered at: 01/03/22 0854     Medical Intervention Limits:    NO intubation (DNI)     Level Of Support Discussed With:    Patient     Code Status (Patient has no pulse and is not breathing):    CPR (Attempt to Resuscitate)     Medical Interventions (Patient has pulse or is breathing):    Limited Support       John Maher MD  01/03/22

## 2022-01-03 NOTE — CONSULTS
Patient Name: Renaldo Rodriguez  :1965  56 y.o.    Date of Admission: 2022  Encounter Provider: Rachel Flores MD  Date of Encounter Visit: 22  Place of Service: [facility] CARDIOLOGY  Referring Provider: No ref. provider found  Patient Care Team:  Provider, No Known as PCP - General      Patient being admitted with Covid pneumonia.  Asked to see because of the EKG.  I reviewed the EKG.  There are no acute abnormalities.  Okay to repeat troponin but I do not plan on doing any cardiac testing.  He can follow-up with me in the office in a month.    Rachel Flores MD  22  14:22 EST

## 2022-01-03 NOTE — PROGRESS NOTES
[facility]  Clinical Pharmacy Department     Remdesivir Review Note    Cecil Rodriguez is a 56 y.o. male with confirmed COVID-19 infection on day 1 of hospitalization.     Consulting Provider:  CECIL BRANDON  Date of Confirmed SARS-CoV-2: 12/31/21  Date of Symptom Onset: ~12/28  Planned Duration of Therapy: 5 days  Other Antimicrobials: Ceftriaxone x1  Hydroxychloroquine or chloroquine prior to arrival: NO    Allergies  Allergies as of 01/02/2022    (No Known Allergies)       Microbiology:  Microbiology Results (last 10 days)       Procedure Component Value - Date/Time    COVID-19,[facility] IN-HOUSE CEPHEID/EDWINA NP SWAB IN TRANSPORT MEDIA 8-12 HR TAT - Swab, Nasopharynx [[id number]]  (Abnormal) Collected: 01/02/22 2027    Lab Status: Final result Specimen: Swab from Nasopharynx Updated: 01/02/22 2142     COVID19 Detected    Narrative:      Fact sheet for providers: https://www.fda.gov/media/748188/download     Fact sheet for patients: https://www.fda.gov/media/609163/download    COVID-19,[facility],NP/OP Swab in Transport Media or ESwab 72 HR TAT - Swab, Anterior nasal [[id number]]  (Abnormal) Collected: 12/31/21 0832    Lab Status: Final result Specimen: Swab from Anterior nasal Updated: 01/01/22 2107    Narrative:      The following orders were created for panel order COVID-19,[facility],NP/OP Swab in Transport Media or ESwab 72 HR TAT - Swab, Anterior nasal.  Procedure                               Abnormality         Status                     ---------                               -----------         ------                     COVID-19,[facility] ROUTINE...[[id number]]  Abnormal            Final result                 Please view results for these tests on the individual orders.    COVID-19,[facility] ROUTINE, NP/OP SWAB IN TRANSPORT MEDIA OR ESWAB 72 HR TAT - Swab, Anterior nasal [[id number]]  (Abnormal) Collected: 12/31/21 0832    Lab Status: Final result Specimen: Swab from Anterior nasal Updated: 01/01/22 2107      SARS-CoV-2, TEMITOPE Detected     Comment: Patients who have a positive COVID-19 test result may now have  treatment options. Treatment options are available for patients  with mild to moderate symptoms and for hospitalized patients.  Visit our website at [email or web address] for  resources and information.  This nucleic acid amplification test was developed and its performance  characteristics determined by [employer]. Nucleic acid  amplification tests include RT-PCR and TMA. This test has not been  FDA cleared or approved. This test has been authorized by FDA under  an Emergency Use Authorization (EUA). This test is only authorized  for the duration of time the declaration that circumstances exist  justifying the authorization of the emergency use of in vitro  diagnostic tests for detection of SARS-CoV-2 virus and/or diagnosis  of COVID-19 infection under section 564(b)(1) of the Act, 21 U.S.C.  360bbb-3(b) (1), unless the authorization is terminated or revoked  sooner.  When diagnostic testing is negative, the possibility of a false  negative result should be considered in the context of a patient's  recent exposures and the presence of clinical signs and symptoms  consistent with COVID-19. An individual without symptoms of COVID-19  and who is not shedding SARS-CoV-2 virus would expect to have a  negative (not detected) result in this assay.       Narrative:      Performed at:  [address]  [id number]  : Gino Bustillo PhD, Phone:  [phone]    SARS-CoV-2, TEMITOPE 2 DAY TAT - Swab, Anterior nasal [[id number]] Collected: 12/31/21 0832    Lab Status: Final result Specimen: Swab from Anterior nasal Updated: 01/01/22 2107     [facility] SARS-COV-2, TEMITOPE 2 DAY TAT Performed    Narrative:      Performed at:  [address]  [id number]  : Gino Bustillo PhD, Phone:  [phone]            Radiology/Imaging:  XR  Chest 1 View     Result Date: 1/2/2022  CHEST SINGLE VIEW  HISTORY: COVID-19. Shortness of air.  COMPARISON: Two-view chest 12/31/2021.  FINDINGS: There are patchy bilateral areas of increased interstitial and hazy airspace disease consistent with COVID-19 infiltrates. Infiltrates appear somewhat more dense and confluent as compared to the previous exam though without evidence for progression in distribution. No perihilar edema or pneumothorax or pneumomediastinum is evident. Cardiac monitoring leads are noted. The heart size is borderline enlarged.       Patchy bilateral pulmonary infiltrates with mild increase in density and consolidation when compared to the previous exam. No other change.  This report was finalized on 1/2/2022 9:03 PM by Dr. Chidi Ruano M.D.      Vitals/Labs/I&O  SpO2 Percentage    01/02/22 2025 01/02/22 2029 01/02/22 2131   SpO2: 91% 93% 94%        Results from last 7 days   Lab Units 01/02/22 2019   WBC 10*3/mm3 10.20     Results from last 7 days   Lab Units 01/02/22 2019   PROCALCITONIN ng/mL 2.38*     Results from last 7 days   Lab Units 01/02/22 2019   AST (SGOT) U/L 69*      Results from last 7 days   Lab Units 01/02/22  2019   ALT (SGPT) U/L 46*       Estimated Creatinine Clearance: 85.4 mL/min (by C-G formula based on SCr of 1.17 mg/dL).  Results from last 7 days   Lab Units 01/02/22 2019   BUN mg/dL 15   CREATININE mg/dL 1.17     Intake & Output (last 3 days)         12/31 0701  01/01 0700 01/01 0701  01/02 0700 01/02 0701 01/03 0700    IV Piggyback   100    Total Intake(mL/kg)   100 (0.9)    Net   +100                   Assessment/Plan:    Patient is hospitalized with confirmed, severe COVID-19 infection and started on remdesivir 200 mg IV once followed by 100 mg IV daily for 4 days (5 day total duration). All inclusions, exclusions, and monitoring requirements listed below have been reviewed.    Patient is hospitalized with confirmed COVID-19 infection  Patient is requiring  an increase in baseline supplemental oxygen requirements OR SpO2 </= 94% on room air secondary to COVID-19 infection  Baseline and daily LFTs and Scr have been ordered prior to remdesivir initiation  ALT is not ? 10 times the upper limit of normal  Patient is not on concomitant hydroxychloroquine or chloroquine  Patient does not require invasive mechanical ventilation (IMV) or ECMO  Patient is within 10 days from symptom onset       Thank you for involving pharmacy in this patient's care. Please contact pharmacy with any questions or concerns.                           Wm Claros [facility]  Clinical Pharmacist  01/02/22 23:04 EST

## 2022-01-03 NOTE — ED PROVIDER NOTES
EMERGENCY DEPARTMENT ENCOUNTER    Room Number:  40/40  Date seen:  1/2/2022  PCP: Provider, No Known  Historian: Patient      HPI:  Chief Complaint: Short of breath  A complete HPI/ROS/PMH/PSH/SH/FH are unobtainable due to: Nothing  Context: Renaldo Rodriguez is a 56 y.o. male who presents to the ED c/o shortness of breath.  Patient reports he has been feeling well for couple weeks.  He actually underwent bicep tendon repair on his right upper extremity last Tuesday.  He had preprocedural Covid testing which was negative.  He reports that after the procedures when he started feeling badly.  On Friday he went to urgent care and was prescribed doxycycline and an inhaler.  He had a Covid test performed at that time that has since resulted positive.  He has not been vaccinated for COVID-19.  He denies chest pain.  He reports that his shortness of breath is really progressed over the last 24 hours.  He arrived by private vehicle today and in triage had O2 saturations of 70% on room air.  He is our option at baseline.  He denies history of asthma or COPD or other known medical problems.            PAST MEDICAL HISTORY  Active Ambulatory Problems     Diagnosis Date Noted   • Neuritis or radiculitis due to rupture of lumbar intervertebral disc 09/18/2019     Resolved Ambulatory Problems     Diagnosis Date Noted   • No Resolved Ambulatory Problems     Past Medical History:   Diagnosis Date   • Injury of back    • Lumbar herniated disc    • Numbness and tingling of right lower extremity    • Right leg pain          PAST SURGICAL HISTORY  Past Surgical History:   Procedure Laterality Date   • BICEPS TENDON REPAIR Right    • LUMBAR DISCECTOMY Right 10/4/2019    Procedure: Right lumbar 4 to lumbar 5 laminectomy and discectomy with metrx;  Surgeon: Ghanshyam Canas MD;  Location: [facility] OR;  Service: Neurosurgery   • NO PAST SURGERIES           FAMILY HISTORY  Family History   Problem Relation Age of Onset   • Arpan  Hyperthermia Neg Hx          SOCIAL HISTORY  Social History     Socioeconomic History   • Marital status:    Tobacco Use   • Smoking status: Never Smoker   • Smokeless tobacco: Never Used   Substance and Sexual Activity   • Alcohol use: Yes     Comment: social   • Drug use: No   • Sexual activity: Defer         ALLERGIES  Patient has no known allergies.        REVIEW OF SYSTEMS  Review of Systems   Review of all 14 systems is negative other than stated in the HPI above.      PHYSICAL EXAM  ED Triage Vitals   Temp Heart Rate Resp BP SpO2   01/02/22 2030 01/02/22 2025 01/02/22 2025 01/02/22 2029 01/02/22 2008   100 °F (37.8 °C) 98 22 141/74 (!) 70 %      Temp src Heart Rate Source Patient Position BP Location FiO2 (%)   01/02/22 2030 -- 01/02/22 2029 01/02/22 2029 --   Oral  Lying Left arm          GENERAL: Awake and alert, no acute distress  HENT: nares patent  EYES: no scleral icterus, EOMI  CV: regular rhythm, normal rate  RESPIRATORY: normal effort, lungs clear auscultation bilaterally, mild tachypnea  ABDOMEN: soft, nondistended, nontender throughout  MUSCULOSKELETAL: no deformity, no peripheral edema, no calf tenderness bilaterally.  Right upper extremity is in a splint with Ace wrap.  NEURO: alert, moves all extremities, follows commands  PSYCH:  calm, cooperative  SKIN: warm, dry    Vital signs and nursing notes reviewed.          LAB RESULTS  Recent Results (from the past 24 hour(s))   Comprehensive Metabolic Panel    Collection Time: 01/02/22  8:19 PM    Specimen: Blood   Result Value Ref Range    Glucose 128 (H) 65 - 99 mg/dL    BUN 15 6 - 20 mg/dL    Creatinine 1.17 0.76 - 1.27 mg/dL    Sodium 136 136 - 145 mmol/L    Potassium 3.9 3.5 - 5.2 mmol/L    Chloride 96 (L) 98 - 107 mmol/L    CO2 19.8 (L) 22.0 - 29.0 mmol/L    Calcium 8.6 8.6 - 10.5 mg/dL    Total Protein 6.2 6.0 - 8.5 g/dL    Albumin 3.50 3.50 - 5.20 g/dL    ALT (SGPT) 46 (H) 1 - 41 U/L    AST (SGOT) 69 (H) 1 - 40 U/L    Alkaline  Phosphatase 74 39 - 117 U/L    Total Bilirubin 0.8 0.0 - 1.2 mg/dL    eGFR Non African Amer 64 >60 mL/min/1.73    Globulin 2.7 gm/dL    A/G Ratio 1.3 g/dL    BUN/Creatinine Ratio 12.8 7.0 - 25.0    Anion Gap 20.2 (H) 5.0 - 15.0 mmol/L   BNP    Collection Time: 01/02/22  8:19 PM    Specimen: Blood   Result Value Ref Range    proBNP 355.9 0.0 - 900.0 pg/mL   Troponin    Collection Time: 01/02/22  8:19 PM    Specimen: Blood   Result Value Ref Range    Troponin T <0.010 0.000 - 0.030 ng/mL   Green Top (Gel)    Collection Time: 01/02/22  8:19 PM   Result Value Ref Range    Extra Tube Hold for add-ons.    Lavender Top    Collection Time: 01/02/22  8:19 PM   Result Value Ref Range    Extra Tube hold for add-on    Gold Top - SST    Collection Time: 01/02/22  8:19 PM   Result Value Ref Range    Extra Tube Hold for add-ons.    [place] Blood Culture Bottle Set    Collection Time: 01/02/22  8:19 PM    Specimen: Arm, Left; Blood   Result Value Ref Range    Extra Tube Hold for add-ons.    Light Blue Top    Collection Time: 01/02/22  8:19 PM   Result Value Ref Range    Extra Tube hold for add-on    CBC Auto Differential    Collection Time: 01/02/22  8:19 PM    Specimen: Blood   Result Value Ref Range    WBC 10.20 3.40 - 10.80 10*3/mm3    RBC 5.03 4.14 - 5.80 10*6/mm3    Hemoglobin 14.2 13.0 - 17.7 g/dL    Hematocrit 40.9 37.5 - 51.0 %    MCV 81.3 79.0 - 97.0 fL    MCH 28.2 26.6 - 33.0 pg    MCHC 34.7 31.5 - 35.7 g/dL    RDW 13.5 12.3 - 15.4 %    RDW-SD 39.0 37.0 - 54.0 fl    MPV 10.4 6.0 - 12.0 fL    Platelets 329 140 - 450 10*3/mm3    Neutrophil % 90.0 (H) 42.7 - 76.0 %    Lymphocyte % 6.1 (L) 19.6 - 45.3 %    Monocyte % 1.9 (L) 5.0 - 12.0 %    Eosinophil % 0.0 (L) 0.3 - 6.2 %    Basophil % 0.2 0.0 - 1.5 %    Immature Grans % 1.8 (H) 0.0 - 0.5 %    Neutrophils, Absolute 9.19 (H) 1.70 - 7.00 10*3/mm3    Lymphocytes, Absolute 0.62 (L) 0.70 - 3.10 10*3/mm3    Monocytes, Absolute 0.19 0.10 - 0.90 10*3/mm3    Eosinophils, Absolute  0.00 0.00 - 0.40 10*3/mm3    Basophils, Absolute 0.02 0.00 - 0.20 10*3/mm3    Immature Grans, Absolute 0.18 (H) 0.00 - 0.05 10*3/mm3    nRBC 0.0 0.0 - 0.2 /100 WBC   Procalcitonin    Collection Time: 01/02/22  8:19 PM    Specimen: Blood   Result Value Ref Range    Procalcitonin 2.38 (H) 0.00 - 0.25 ng/mL   C-reactive Protein    Collection Time: 01/02/22  8:19 PM    Specimen: Blood   Result Value Ref Range    C-Reactive Protein 25.13 (H) 0.00 - 0.50 mg/dL   COVID-19,[facility] IN-HOUSE CEPHEID/EDWINA NP SWAB IN TRANSPORT MEDIA 8-12 HR TAT - Swab, Nasopharynx    Collection Time: 01/02/22  8:27 PM    Specimen: Nasopharynx; Swab   Result Value Ref Range    COVID19 Detected (C) Not Detected - Ref. Range   ECG 12 Lead    Collection Time: 01/02/22  9:02 PM   Result Value Ref Range    QT Interval 349 ms       Ordered the above labs and reviewed the results.        RADIOLOGY  XR Chest 1 View    Result Date: 1/2/2022  CHEST SINGLE VIEW  HISTORY: COVID-19. Shortness of air.  COMPARISON: Two-view chest 12/31/2021.  FINDINGS: There are patchy bilateral areas of increased interstitial and hazy airspace disease consistent with COVID-19 infiltrates. Infiltrates appear somewhat more dense and confluent as compared to the previous exam though without evidence for progression in distribution. No perihilar edema or pneumothorax or pneumomediastinum is evident. Cardiac monitoring leads are noted. The heart size is borderline enlarged.      Patchy bilateral pulmonary infiltrates with mild increase in density and consolidation when compared to the previous exam. No other change.  This report was finalized on 1/2/2022 9:03 PM by Dr. Chidi Ruano M.D.        Ordered the above noted radiological studies. Reviewed by me in PACS.            PROCEDURES  Critical Care  Performed by: Krishna Hebert MD  Authorized by: Krishna Hebert MD     Critical care provider statement:     Critical care time (minutes):  30    Critical care was  necessary to treat or prevent imminent or life-threatening deterioration of the following conditions:  Respiratory failure    Critical care was time spent personally by me on the following activities:  Discussions with consultants, evaluation of patient's response to treatment, development of treatment plan with patient or surrogate, examination of patient, obtaining history from patient or surrogate, ordering and review of laboratory studies, ordering and review of radiographic studies, pulse oximetry, re-evaluation of patient's condition and ordering and performing treatments and interventions                  MEDICATIONS GIVEN IN ER  Medications   sodium chloride 0.9 % flush 10 mL (has no administration in time range)   cefTRIAXone (ROCEPHIN) 1 g in sodium chloride 0.9 % 100 mL IVPB-VTB (has no administration in time range)   dexamethasone (DECADRON) injection 6 mg (6 mg Intravenous Given 1/2/22 2049)                   MEDICAL DECISION MAKING, PROGRESS, and CONSULTS    All labs have been independently reviewed by me.  All radiology studies have been reviewed by me and discussed with radiologist dictating the report.   EKG's independently viewed and interpreted by me.  Discussion below represents my analysis of pertinent findings related to patient's condition, differential diagnosis, treatment plan and final disposition.      Differential diagnosis includes but is not limited to:  Covid pneumonia  Bacterial pneumonia  CHF  Pulmonary embolus      ED Course as of 01/02/22 2215   Sun Jan 02, 2022 2129 C-Reactive Protein(!): 25.13 [JR]   2129 Procalcitonin(!): 2.38 [JR]   2129 WBC: 10.20 [JR]   2129 Hemoglobin: 14.2 [JR]   2143 EKG          EKG time: 2102  Rhythm/Rate: Sinus rhythm, 99  P waves and MD: Normal  QRS, axis: Low voltage, normal axis  ST and T waves: No acute ischemic changes    Interpreted Contemporaneously by me, independently viewed         [JR]   2145 Chest x-ray independently interpreted PACs.   There are patchy perihilar and peripheral opacities bilaterally consistent with COVID-19 pneumonia. [JR]   2150 Patient with suspected Covid pneumonia however procalcitonin is elevated therefore I administered empiric Rocephin in addition to the 6 mg dexamethasone IV. [JR]   2213 Discussed with LUZ Hamilton for [facility], who agrees admit on behalf of Dr. Siegel. [JR]   2214 Patient reassessed.  He is currently stable on nonrebreather mask with O2 saturation 94%. [JR]   2215 SpO2: 94 % [JR]      ED Course User Index  [JR] Krishna Hebert MD              I wore an N95 mask, face shield, and gloves during this patient encounter.  Patient also wearing a surgical mask.  Hand hygeine performed before and after seeing the patient.    DIAGNOSIS  Final diagnoses:   Pneumonia due to COVID-19 virus   Acute respiratory failure with hypoxia (HCC)         DISPOSITION  ADMIT            Latest Documented Vital Signs:  As of 22:15 EST  BP- 139/79 HR- 89 Temp- 100 °F (37.8 °C) (Oral) O2 sat- 94%        --    Please note that portions of this were completed with a voice recognition program.          Krishna Hebert MD  01/02/22 [id number]

## 2022-01-03 NOTE — PROGRESS NOTES
[facility]    eCcil Rodriguez has been consulted for pharmacy to dose enoxaparin for Prophylaxis of Venous Thromboembolism per CECIL BRANDON's request.         Relevant clinical data and objective history reviewed:  56 y.o. male 175 cm (68.9\") 111 kg (244 lb 11.4 oz)    Home Anticoagulation: no    Past Medical History:   Diagnosis Date    Injury of back     lifted something at work, playing golf    Lumbar herniated disc     Numbness and tingling of right lower extremity     Right leg pain      has No Known Allergies.    Lab Results   Component Value Date    WBC 10.20 01/02/2022    HGB 14.2 01/02/2022    HCT 40.9 01/02/2022    MCV 81.3 01/02/2022     01/02/2022     Lab Results   Component Value Date    GLUCOSE 128 (H) 01/02/2022    CALCIUM 8.6 01/02/2022     01/02/2022    K 3.9 01/02/2022    CO2 19.8 (L) 01/02/2022    CL 96 (L) 01/02/2022    BUN 15 01/02/2022    CREATININE 1.17 01/02/2022    EGFRIFNONA 64 01/02/2022    BCR 12.8 01/02/2022    ANIONGAP 20.2 (H) 01/02/2022       Estimated Creatinine Clearance: 85.4 mL/min (by C-G formula based on SCr of 1.17 mg/dL).    Active Inpatient Anticoagulation Orders: NO    Assessment/Plan    Will start patient on 40 mg  subcutaneous every 24 hours, adjusted for renal function.. Pharmacy will continue to follow.     Wm Claros [facility]   1/2/2022   "

## 2022-01-03 NOTE — PAYOR COMM NOTE
"Cecil Rodriguez (56 y.o. Male)     ATTN: INITIAL CLINICALS TO REVIEW FOR INPATIENT AUTHORIZATION: REF# [id number]    PLEASE REPLY TO UR DEPT: [phone],  [phone]              Date of Birth Social Security Number Address Home Phone MRN    1965  [id number] [facility] [address] [phone] [id number]    Restorationist Marital Status             Sikhism        Admission Date Admission Type Admitting Provider Attending Provider Department, Room/Bed    1/2/22 Emergency John Maher MD Furlow, Stephen Matthew, MD [facility] Department, 40/40    Discharge Date Discharge Disposition Discharge Destination                         Attending Provider: John Maher MD    Allergies: No Known Allergies    Isolation: Enh Drop/Con   Infection: COVID (confirmed) (01/01/22)   Code Status: CPR   Advance Care Planning Activity    Ht: 175 cm (68.9\")   Wt: 111 kg (244 lb 11.4 oz)    Admission Cmt: None   Principal Problem: Pneumonia due to COVID-19 virus [U07.1,J12.82]                 Active Insurance as of 1/2/2022     Primary Coverage     Payor Plan Insurance Group Employer/Plan Group    [employer]ET[employer] COMMERCIAL AETNA [id number]     Payor Plan Address Payor Plan Phone Number Payor Plan Fax Number Effective Dates    [address] [phone]  12/31/2021 - None Entered    [facility] [address]       Subscriber Name Subscriber Birth Date Member ID       AMAYACECIL VIKTORIA 1965 [id number]                 Emergency Contacts      (Rel.) Home Phone Work Phone Mobile Phone    Ban Rodriguez (Spouse) [phone] -- --    Samson Rodriguez (Brother) -- -- [phone]               History & Physical      Cecil Cooper APRN at 01/02/22 7658     Attestation signed by John Maher MD at 01/03/22 7751    I have reviewed this documentation and agree.  Electronically signed by John Maher MD, 01/03/22, 6:54 AM " EST.                          Patient Name:  Renaldo Rodriguez  YOB: 1965  MRN:  [id number]  Admit Date:  1/2/2022  Patient Care Team:  Provider, No Known as PCP - General      Subjective   History Present Illness     Chief Complaint   Patient presents with   • Shortness of Breath       Mr. Rodriguez is a 56 y.o. male with a history of herniated disc of the lumbar spine and recent right bicep tendon rupture with repair that presents to [facility] complaining of increased shortness of air.  Mr. Rodriguez states that on December 21 he was trying to move some pallets at work and ruptured his right bicep tendon.  He had a repair done at the time was Covid negative per preprocedure testing.  He began feeling ill went to be evaluated at an Urgent Care. He was retested this past Friday was found to be Covid positive.  Prior to receiving his positive result, the provider at the urgent care center started him on doxycycline and and an inhaler.  Unfortunately, over the last 24 hours his SOA had increased and he became more distressed at home.  Currently he is receiving supplemental oxygen via nonrebreather 15 L with oxygen saturation around 93%.  Patient was 70% on room air when he presented to the emergency department.  Upon my assessment patient was dyspneic, tachypneic and in mild distress.  He became very short of air when answering yes or no questions.  Accessory muscles in use the entire time of assessment. He denies any chest tightness or chest pain at this time. He also denies any pain related to his recent surgery.  At this time patient educated on symptom management, the need for a blood thinning agent and the benefits of remdesivir infusion.  Patient agreeable to treatment plan.  Review of systems, physical exam, diagnostic data and plan are as outlined below.      History of Present Illness  Review of Systems   Constitutional: Positive for chills and fever. Negative for diaphoresis.   HENT:  Negative for sore throat and trouble swallowing.    Respiratory: Positive for shortness of breath. Negative for chest tightness.    Cardiovascular: Negative for chest pain.   Gastrointestinal: Positive for diarrhea and nausea. Negative for constipation and vomiting.   Endocrine: Negative for polydipsia.   Genitourinary: Negative for difficulty urinating.   Neurological: Positive for dizziness, weakness, light-headedness and headaches. Negative for tremors, syncope and speech difficulty.   Psychiatric/Behavioral: Negative for agitation and confusion.        Personal History     Past Medical History:   Diagnosis Date   • Injury of back     lifted something at work, playing golf   • Lumbar herniated disc    • Numbness and tingling of right lower extremity    • Right leg pain      Past Surgical History:   Procedure Laterality Date   • BICEPS TENDON REPAIR Right    • LUMBAR DISCECTOMY Right 10/4/2019    Procedure: Right lumbar 4 to lumbar 5 laminectomy and discectomy with metrx;  Surgeon: Ghanshyam Canas MD;  Location: [facility];  Service: Neurosurgery   • NO PAST SURGERIES       Family History   Problem Relation Age of Onset   • Malig Hyperthermia Neg Hx      Social History     Tobacco Use   • Smoking status: Never Smoker   • Smokeless tobacco: Never Used   Substance Use Topics   • Alcohol use: Yes     Comment: social   • Drug use: No     No current facility-administered medications on file prior to encounter.     Current Outpatient Medications on File Prior to Encounter   Medication Sig Dispense Refill   • acetaminophen (TYLENOL) 500 MG tablet Take 1,000 mg by mouth Every 6 (Six) Hours As Needed for Mild Pain .     • albuterol sulfate  (90 Base) MCG/ACT inhaler Inhale 2 puffs Every 4 (Four) Hours As Needed for Wheezing for up to 30 days. 18 g 0   • dexamethasone (DECADRON) 1.5 MG tablet 3 am 3 pm for 4 day's, 3 am 2 pm for 1 day, 2 am 2 pm for 3 day's, 2 am 1 pm for 1 day, 1 am 1 pm for 3 day's, 1 po am x  1 day only 51 tablet 0   • gabapentin (NEURONTIN) 300 MG capsule Take 2 capsules by mouth Every Night. 60 capsule 2   • ibuprofen (ADVIL,MOTRIN) 200 MG tablet Take 200 mg by mouth Every 6 (Six) Hours As Needed for Mild Pain .     • ivermectin (STROMECTOL) 3 MG tablet tablet Take 3 mg by mouth 1 (One) Time.     • loratadine (CLARITIN) 10 MG tablet Take 10 mg by mouth Daily.     • doxycycline (MONODOX) 100 MG capsule Take 1 capsule by mouth 2 (Two) Times a Day for 10 days. 20 capsule 0   • HYDROcodone-acetaminophen (NORCO) 5-325 MG per tablet Take 1 tablet by mouth Every 4 (Four) Hours As Needed for Moderate Pain  (pain). (Patient taking differently: Take 1 tablet by mouth As Needed for Moderate Pain  (pain).) 35 tablet 0     No Known Allergies    Objective    Objective     Vital Signs  Temp:  [100 °F (37.8 °C)] 100 °F (37.8 °C)  Heart Rate:  [89-99] 89  Resp:  [22] 22  BP: (139-141)/(74-79) 139/79  SpO2:  [70 %-94 %] 94 %  on  Flow (L/min):  [15] 15;   Device (Oxygen Therapy): nonrebreather mask  Body mass index is 36.24 kg/m².    Physical Exam  Constitutional:       General: He is in acute distress.      Appearance: Normal appearance. He is obese. He is ill-appearing.   HENT:      Head: Normocephalic and atraumatic.      Mouth/Throat:      Mouth: Mucous membranes are dry.   Eyes:      General: No scleral icterus.     Extraocular Movements: Extraocular movements intact.      Conjunctiva/sclera: Conjunctivae normal.   Cardiovascular:      Rate and Rhythm: Normal rate and regular rhythm.      Pulses: Normal pulses.      Heart sounds: Normal heart sounds. No murmur heard.      Pulmonary:      Effort: Tachypnea, accessory muscle usage and respiratory distress present.      Breath sounds: Decreased air movement present. Examination of the right-upper field reveals rhonchi. Examination of the left-upper field reveals wheezing. Examination of the right-middle field reveals rhonchi. Examination of the right-lower field  reveals decreased breath sounds. Examination of the left-lower field reveals decreased breath sounds. Decreased breath sounds, wheezing and rhonchi present.   Chest:      Chest wall: No tenderness.   Abdominal:      General: Bowel sounds are decreased.      Palpations: Abdomen is soft.      Tenderness: There is no abdominal tenderness.   Musculoskeletal:         General: Signs of injury present.      Right upper arm: Tenderness present.      Left upper arm: Normal.      Cervical back: Normal range of motion and neck supple. No rigidity or tenderness.   Neurological:      General: No focal deficit present.      Mental Status: He is alert and oriented to person, place, and time. Mental status is at baseline.      Cranial Nerves: No cranial nerve deficit.      Motor: Weakness present.   Psychiatric:         Mood and Affect: Mood normal.         Behavior: Behavior normal.         Thought Content: Thought content normal.         Judgment: Judgment normal.         Results Review:  I reviewed the patient's new clinical results.  I reviewed the patient's new imaging results and agree with the interpretation.  I reviewed the patient's other test results and agree with the interpretation  I personally viewed and interpreted the patient's EKG/Telemetry data  Discussed with ED provider.    Lab Results (last 24 hours)     Procedure Component Value Units Date/Time    CBC & Differential [[id number]]  (Abnormal) Collected: 01/02/22 2019    Specimen: Blood Updated: 01/02/22 2049    Narrative:      The following orders were created for panel order CBC & Differential.  Procedure                               Abnormality         Status                     ---------                               -----------         ------                     CBC Auto Differential[[id number]]        Abnormal            Final result                 Please view results for these tests on the individual orders.    Comprehensive Metabolic Panel [[id number]]   (Abnormal) Collected: 01/02/22 2019    Specimen: Blood Updated: 01/02/22 2152     Glucose 128 mg/dL      BUN 15 mg/dL      Creatinine 1.17 mg/dL      Sodium 136 mmol/L      Potassium 3.9 mmol/L      Comment: Specimen hemolyzed.  Results may be affected.        Chloride 96 mmol/L      CO2 19.8 mmol/L      Calcium 8.6 mg/dL      Total Protein 6.2 g/dL      Albumin 3.50 g/dL      ALT (SGPT) 46 U/L      Comment: Specimen hemolyzed.  Results may be affected.        AST (SGOT) 69 U/L      Comment: Specimen hemolyzed.  Results may be affected.        Alkaline Phosphatase 74 U/L      Total Bilirubin 0.8 mg/dL      eGFR Non African Amer 64 mL/min/1.73      Globulin 2.7 gm/dL      A/G Ratio 1.3 g/dL      BUN/Creatinine Ratio 12.8     Anion Gap 20.2 mmol/L     Narrative:      GFR Normal >60  Chronic Kidney Disease <60  Kidney Failure <15      BNP [[id number]]  (Normal) Collected: 01/02/22 2019    Specimen: Blood Updated: 01/02/22 2058     proBNP 355.9 pg/mL     Narrative:      Among patients with dyspnea, NT-proBNP is highly sensitive for the detection of acute congestive heart failure. In addition NT-proBNP of <300 pg/ml effectively rules out acute congestive heart failure with 99% negative predictive value.    Results may be falsely decreased if patient taking Biotin.      Troponin [[id number]]  (Normal) Collected: 01/02/22 2019    Specimen: Blood Updated: 01/02/22 2143     Troponin T <0.010 ng/mL     Narrative:      Troponin T Reference Range:  <= 0.03 ng/mL-   Negative for AMI  >0.03 ng/mL-     Abnormal for myocardial necrosis.  Clinicians would have to utilize clinical acumen, EKG, Troponin and serial changes to determine if it is an Acute Myocardial Infarction or myocardial injury due to an underlying chronic condition.       Results may be falsely decreased if patient taking Biotin.      Postmaster Blood Culture Bottle Set [[id number]] Collected: 01/02/22 2019    Specimen: Blood from Arm, Left Updated: 01/02/22 2130     Extra  "Tube Hold for add-ons.     Comment: Auto resulted.       CBC Auto Differential [[id number]]  (Abnormal) Collected: 01/02/22 2019    Specimen: Blood Updated: 01/02/22 2049     WBC 10.20 10*3/mm3      RBC 5.03 10*6/mm3      Hemoglobin 14.2 g/dL      Hematocrit 40.9 %      MCV 81.3 fL      MCH 28.2 pg      MCHC 34.7 g/dL      RDW 13.5 %      RDW-SD 39.0 fl      MPV 10.4 fL      Platelets 329 10*3/mm3      Neutrophil % 90.0 %      Lymphocyte % 6.1 %      Monocyte % 1.9 %      Eosinophil % 0.0 %      Basophil % 0.2 %      Immature Grans % 1.8 %      Neutrophils, Absolute 9.19 10*3/mm3      Lymphocytes, Absolute 0.62 10*3/mm3      Monocytes, Absolute 0.19 10*3/mm3      Eosinophils, Absolute 0.00 10*3/mm3      Basophils, Absolute 0.02 10*3/mm3      Immature Grans, Absolute 0.18 10*3/mm3      nRBC 0.0 /100 WBC     Procalcitonin [[id number]]  (Abnormal) Collected: 01/02/22 2019    Specimen: Blood Updated: 01/02/22 2108     Procalcitonin 2.38 ng/mL     Narrative:      As a Marker for Sepsis (Non-Neonates):     1. <0.5 ng/mL represents a low risk of severe sepsis and/or septic shock.  2. >2 ng/mL represents a high risk of severe sepsis and/or septic shock.    As a Marker for Lower Respiratory Tract Infections that require antibiotic therapy:  PCT on Admission     Antibiotic Therapy             6-12 Hrs later  >0.5                          Strongly Recommended            >0.25 - <0.5             Recommended  0.1 - 0.25                  Discouraged                       Remeasure/reassess PCT  <0.1                         Strongly Discouraged         Remeasure/reassess PCT      As 28 day mortality risk marker: \"Change in Procalcitonin Result\" (>80% or <=80%) if Day 0 (or Day 1) and Day 4 values are available. Refer to http://www.[facility]-pct-calculator.com/    Change in PCT <=80 %   A decrease of PCT levels below or equal to 80% defines a positive change in PCT test result representing a higher risk for 28-day all-cause mortality of " patients diagnosed with severe sepsis or septic shock.    Change in PCT >80 %   A decrease of PCT levels of more than 80% defines a negative change in PCT result representing a lower risk for 28-day all-cause mortality of patients diagnosed with severe sepsis or septic shock.                C-reactive Protein [[id number]]  (Abnormal) Collected: 01/02/22 2019    Specimen: Blood Updated: 01/02/22 2118     C-Reactive Protein 25.13 mg/dL     D-dimer, Quantitative [[id number]]  (Abnormal) Collected: 01/02/22 2019    Specimen: Blood Updated: 01/02/22 2220     D-Dimer, Quantitative 2.50 MCGFEU/mL     Narrative:      The Stago D-Dimer test used in conjunction with a clinical pretest probability (PTP) assessment model, has been approved by the FDA to rule out the presence of venous thromboembolism (VTE) in outpatients suspected of deep venous thrombosis (DVT) or pulmonary embolism (PE). The cut-off for negative predictive value is <0.50 MCGFEU/mL.    CK [[id number]]  (Abnormal) Collected: 01/02/22 2019    Specimen: Blood Updated: 01/02/22 2306     Creatine Kinase 1,229 U/L     Ferritin [[id number]] Collected: 01/02/22 2019    Specimen: Blood Updated: 01/02/22 2248    C-reactive Protein [[id number]]  (Abnormal) Collected: 01/02/22 2019    Specimen: Blood Updated: 01/02/22 2306     C-Reactive Protein 26.49 mg/dL     Lactate Dehydrogenase [[id number]]  (Abnormal) Collected: 01/02/22 2019    Specimen: Blood Updated: 01/02/22 2309      U/L      Comment: Specimen hemolyzed.  Results may be affected.       Fibrinogen [[id number]]  (Abnormal) Collected: 01/02/22 2019    Specimen: Blood Updated: 01/02/22 2306     Fibrinogen 960 mg/dL     Hepatic Function Panel [[id number]]  (Abnormal) Collected: 01/02/22 2019    Specimen: Blood Updated: 01/02/22 2309     Total Protein 6.4 g/dL      Albumin 3.50 g/dL      ALT (SGPT) 46 U/L      Comment: Specimen hemolyzed.  Results may be affected.        AST (SGOT) 72 U/L      Comment: Specimen  hemolyzed.  Results may be affected.        Alkaline Phosphatase 76 U/L      Total Bilirubin 0.8 mg/dL      Bilirubin, Direct 0.2 mg/dL      Comment: Specimen hemolyzed. Results may be affected.        Bilirubin, Indirect 0.6 mg/dL     COVID-19,[facility] IN-HOUSE CEPHEID/EDWINA NP SWAB IN TRANSPORT MEDIA 8-12 HR TAT - Swab, Nasopharynx [[id number]]  (Abnormal) Collected: 01/02/22 2027    Specimen: Swab from Nasopharynx Updated: 01/02/22 2142     COVID19 Detected    Narrative:      Fact sheet for providers: https://www.fda.gov/media/462693/download     Fact sheet for patients: https://www.fda.gov/media/971851/download    Blood Culture - Blood, Arm, Left [[id number]] Collected: 01/02/22 2027    Specimen: Blood from Arm, Left Updated: 01/02/22 2039          Imaging Results (Last 24 Hours)     Procedure Component Value Units Date/Time    XR Chest 1 View [[id number]] Collected: 01/02/22 2102     Updated: 01/02/22 2106    Narrative:      CHEST SINGLE VIEW     HISTORY: COVID-19. Shortness of air.     COMPARISON: Two-view chest 12/31/2021.     FINDINGS: There are patchy bilateral areas of increased interstitial and  hazy airspace disease consistent with COVID-19 infiltrates. Infiltrates  appear somewhat more dense and confluent as compared to the previous  exam though without evidence for progression in distribution. No  perihilar edema or pneumothorax or pneumomediastinum is evident. Cardiac  monitoring leads are noted. The heart size is borderline enlarged.       Impression:      Patchy bilateral pulmonary infiltrates with mild increase in  density and consolidation when compared to the previous exam. No other  change.     This report was finalized on 1/2/2022 9:03 PM by Dr. Chidi Ruano M.D.               ECG 12 Lead   Preliminary Result   HEART RATE= 99  bpm   RR Interval= 606  ms   AR Interval= 138  ms   P Horizontal Axis= -28  deg   P Front Axis= 51  deg   QRSD Interval= 83  ms   QT Interval= 349  ms   QRS Axis= 1  deg    T Wave Axis= 22  deg   - ABNORMAL ECG -   Sinus rhythm   Left atrial enlargement   Abnormal R-wave progression, late transition   Borderline ST depression, lateral leads   Baseline wander in lead(s) V1   Electronically Signed By:    Date and Time of Study: 2022-01-02 21:02:06      ECG 12 Lead    (Results Pending)     Assessment/Plan   Assessment/Plan   Active Hospital Problems    Diagnosis  POA   • **Pneumonia due to COVID-19 virus [U07.1, J12.82]  Yes   • Acute respiratory failure with hypoxia and hypercapnia (HCC) [J96.01, J96.02]  Unknown   • Elevated liver enzymes [R74.8]  Yes      Resolved Hospital Problems   No resolved problems to display.       56 y.o. male admitted with Pneumonia due to COVID-19 virus.    · Telemetry bed for continuous cardiac monitoring  · Continuous pulse oximetry monitoring  · Pulmonary consult. Patient currently has an increased oxygen demand and is on 15 L nonrebreather  · Supplemental oxygen as needed. Please follow pulmonary recommendations  · Decadron p.o. starting tomorrow  · Initiate remdesivir infusion. Patient counseled on this and is agreeable  · Symptom management. Patient is currently afebrile but on the high end of normal and complains of no pain. Heart rate and blood pressure are within desired limits at this time  · Trend Covid labs  · BMP in a.m. We will continue to monitor elevated liver enzymes  · Pharmacy to dose Lovenox for DVT prophylaxis.  · Full code.  · Discussed with patient and Dr. Siegel.      Renaldo Cooper APRANTONI  [facility]  01/02/22  23:15 EST      Electronically signed by John Maher MD at 01/03/22 0654          Emergency Department Notes      Krishna Hebert MD at 01/02/22 2145      Procedure Orders    1. Critical Care [[id number]] ordered by Krishna Hebert MD                EMERGENCY DEPARTMENT ENCOUNTER    Room Number:  40/40  Date seen:  1/2/2022  PCP: Provider, No Known  Historian:  Patient      HPI:  Chief Complaint: Short of breath  A complete HPI/ROS/PMH/PSH/SH/FH are unobtainable due to: Nothing  Context: Renaldo Rodriguez is a 56 y.o. male who presents to the ED c/o shortness of breath.  Patient reports he has been feeling well for couple weeks.  He actually underwent bicep tendon repair on his right upper extremity last Tuesday.  He had preprocedural Covid testing which was negative.  He reports that after the procedures when he started feeling badly.  On Friday he went to urgent care and was prescribed doxycycline and an inhaler.  He had a Covid test performed at that time that has since resulted positive.  He has not been vaccinated for COVID-19.  He denies chest pain.  He reports that his shortness of breath is really progressed over the last 24 hours.  He arrived by private vehicle today and in triage had O2 saturations of 70% on room air.  He is our option at baseline.  He denies history of asthma or COPD or other known medical problems.            PAST MEDICAL HISTORY  Active Ambulatory Problems     Diagnosis Date Noted   • Neuritis or radiculitis due to rupture of lumbar intervertebral disc 09/18/2019     Resolved Ambulatory Problems     Diagnosis Date Noted   • No Resolved Ambulatory Problems     Past Medical History:   Diagnosis Date   • Injury of back    • Lumbar herniated disc    • Numbness and tingling of right lower extremity    • Right leg pain          PAST SURGICAL HISTORY  Past Surgical History:   Procedure Laterality Date   • BICEPS TENDON REPAIR Right    • LUMBAR DISCECTOMY Right 10/4/2019    Procedure: Right lumbar 4 to lumbar 5 laminectomy and discectomy with metrx;  Surgeon: Ghanshyam Canas MD;  Location: [facility];  Service: Neurosurgery   • NO PAST SURGERIES           FAMILY HISTORY  Family History   Problem Relation Age of Onset   • Malig Hyperthermia Neg Hx          SOCIAL HISTORY  Social History     Socioeconomic History   • Marital status:     Tobacco Use   • Smoking status: Never Smoker   • Smokeless tobacco: Never Used   Substance and Sexual Activity   • Alcohol use: Yes     Comment: social   • Drug use: No   • Sexual activity: Defer         ALLERGIES  Patient has no known allergies.        REVIEW OF SYSTEMS  Review of Systems   Review of all 14 systems is negative other than stated in the HPI above.      PHYSICAL EXAM  ED Triage Vitals   Temp Heart Rate Resp BP SpO2   01/02/22 2030 01/02/22 2025 01/02/22 2025 01/02/22 2029 01/02/22 2008   100 °F (37.8 °C) 98 22 141/74 (!) 70 %      Temp src Heart Rate Source Patient Position BP Location FiO2 (%)   01/02/22 2030 -- 01/02/22 2029 01/02/22 2029 --   Oral  Lying Left arm          GENERAL: Awake and alert, no acute distress  HENT: nares patent  EYES: no scleral icterus, EOMI  CV: regular rhythm, normal rate  RESPIRATORY: normal effort, lungs clear auscultation bilaterally, mild tachypnea  ABDOMEN: soft, nondistended, nontender throughout  MUSCULOSKELETAL: no deformity, no peripheral edema, no calf tenderness bilaterally.  Right upper extremity is in a splint with Ace wrap.  NEURO: alert, moves all extremities, follows commands  PSYCH:  calm, cooperative  SKIN: warm, dry    Vital signs and nursing notes reviewed.          LAB RESULTS  Recent Results (from the past 24 hour(s))   Comprehensive Metabolic Panel    Collection Time: 01/02/22  8:19 PM    Specimen: Blood   Result Value Ref Range    Glucose 128 (H) 65 - 99 mg/dL    BUN 15 6 - 20 mg/dL    Creatinine 1.17 0.76 - 1.27 mg/dL    Sodium 136 136 - 145 mmol/L    Potassium 3.9 3.5 - 5.2 mmol/L    Chloride 96 (L) 98 - 107 mmol/L    CO2 19.8 (L) 22.0 - 29.0 mmol/L    Calcium 8.6 8.6 - 10.5 mg/dL    Total Protein 6.2 6.0 - 8.5 g/dL    Albumin 3.50 3.50 - 5.20 g/dL    ALT (SGPT) 46 (H) 1 - 41 U/L    AST (SGOT) 69 (H) 1 - 40 U/L    Alkaline Phosphatase 74 39 - 117 U/L    Total Bilirubin 0.8 0.0 - 1.2 mg/dL    eGFR Non African Amer 64 >60 mL/min/1.73    Globulin  2.7 gm/dL    A/G Ratio 1.3 g/dL    BUN/Creatinine Ratio 12.8 7.0 - 25.0    Anion Gap 20.2 (H) 5.0 - 15.0 mmol/L   BNP    Collection Time: 01/02/22  8:19 PM    Specimen: Blood   Result Value Ref Range    proBNP 355.9 0.0 - 900.0 pg/mL   Troponin    Collection Time: 01/02/22  8:19 PM    Specimen: Blood   Result Value Ref Range    Troponin T <0.010 0.000 - 0.030 ng/mL   Green Top (Gel)    Collection Time: 01/02/22  8:19 PM   Result Value Ref Range    Extra Tube Hold for add-ons.    Lavender Top    Collection Time: 01/02/22  8:19 PM   Result Value Ref Range    Extra Tube hold for add-on    Gold Top - SST    Collection Time: 01/02/22  8:19 PM   Result Value Ref Range    Extra Tube Hold for add-ons.    [facility] Blood Culture Bottle Set    Collection Time: 01/02/22  8:19 PM    Specimen: Arm, Left; Blood   Result Value Ref Range    Extra Tube Hold for add-ons.    Light Blue Top    Collection Time: 01/02/22  8:19 PM   Result Value Ref Range    Extra Tube hold for add-on    CBC Auto Differential    Collection Time: 01/02/22  8:19 PM    Specimen: Blood   Result Value Ref Range    WBC 10.20 3.40 - 10.80 10*3/mm3    RBC 5.03 4.14 - 5.80 10*6/mm3    Hemoglobin 14.2 13.0 - 17.7 g/dL    Hematocrit 40.9 37.5 - 51.0 %    MCV 81.3 79.0 - 97.0 fL    MCH 28.2 26.6 - 33.0 pg    MCHC 34.7 31.5 - 35.7 g/dL    RDW 13.5 12.3 - 15.4 %    RDW-SD 39.0 37.0 - 54.0 fl    MPV 10.4 6.0 - 12.0 fL    Platelets 329 140 - 450 10*3/mm3    Neutrophil % 90.0 (H) 42.7 - 76.0 %    Lymphocyte % 6.1 (L) 19.6 - 45.3 %    Monocyte % 1.9 (L) 5.0 - 12.0 %    Eosinophil % 0.0 (L) 0.3 - 6.2 %    Basophil % 0.2 0.0 - 1.5 %    Immature Grans % 1.8 (H) 0.0 - 0.5 %    Neutrophils, Absolute 9.19 (H) 1.70 - 7.00 10*3/mm3    Lymphocytes, Absolute 0.62 (L) 0.70 - 3.10 10*3/mm3    Monocytes, Absolute 0.19 0.10 - 0.90 10*3/mm3    Eosinophils, Absolute 0.00 0.00 - 0.40 10*3/mm3    Basophils, Absolute 0.02 0.00 - 0.20 10*3/mm3    Immature Grans, Absolute 0.18 (H) 0.00 - 0.05  10*3/mm3    nRBC 0.0 0.0 - 0.2 /100 WBC   Procalcitonin    Collection Time: 01/02/22  8:19 PM    Specimen: Blood   Result Value Ref Range    Procalcitonin 2.38 (H) 0.00 - 0.25 ng/mL   C-reactive Protein    Collection Time: 01/02/22  8:19 PM    Specimen: Blood   Result Value Ref Range    C-Reactive Protein 25.13 (H) 0.00 - 0.50 mg/dL   COVID-19, [facility]-HOUSE CEPHEID/EDWINA NP SWAB IN TRANSPORT MEDIA 8-12 HR TAT - Swab, Nasopharynx    Collection Time: 01/02/22  8:27 PM    Specimen: Nasopharynx; Swab   Result Value Ref Range    COVID19 Detected (C) Not Detected - Ref. Range   ECG 12 Lead    Collection Time: 01/02/22  9:02 PM   Result Value Ref Range    QT Interval 349 ms       Ordered the above labs and reviewed the results.        RADIOLOGY  XR Chest 1 View    Result Date: 1/2/2022  CHEST SINGLE VIEW  HISTORY: COVID-19. Shortness of air.  COMPARISON: Two-view chest 12/31/2021.  FINDINGS: There are patchy bilateral areas of increased interstitial and hazy airspace disease consistent with COVID-19 infiltrates. Infiltrates appear somewhat more dense and confluent as compared to the previous exam though without evidence for progression in distribution. No perihilar edema or pneumothorax or pneumomediastinum is evident. Cardiac monitoring leads are noted. The heart size is borderline enlarged.      Patchy bilateral pulmonary infiltrates with mild increase in density and consolidation when compared to the previous exam. No other change.  This report was finalized on 1/2/2022 9:03 PM by Dr. Chidi Ruano M.D.        Ordered the above noted radiological studies. Reviewed by me in PACS.            PROCEDURES  Critical Care  Performed by: Krishna Hebert MD  Authorized by: Krishna Hebert MD     Critical care provider statement:     Critical care time (minutes):  30    Critical care was necessary to treat or prevent imminent or life-threatening deterioration of the following conditions:  Respiratory failure     Critical care was time spent personally by me on the following activities:  Discussions with consultants, evaluation of patient's response to treatment, development of treatment plan with patient or surrogate, examination of patient, obtaining history from patient or surrogate, ordering and review of laboratory studies, ordering and review of radiographic studies, pulse oximetry, re-evaluation of patient's condition and ordering and performing treatments and interventions                  MEDICATIONS GIVEN IN ER  Medications   sodium chloride 0.9 % flush 10 mL (has no administration in time range)   cefTRIAXone (ROCEPHIN) 1 g in sodium chloride 0.9 % 100 mL IVPB-VTB (has no administration in time range)   dexamethasone (DECADRON) injection 6 mg (6 mg Intravenous Given 1/2/22 2049)                   MEDICAL DECISION MAKING, PROGRESS, and CONSULTS    All labs have been independently reviewed by me.  All radiology studies have been reviewed by me and discussed with radiologist dictating the report.   EKG's independently viewed and interpreted by me.  Discussion below represents my analysis of pertinent findings related to patient's condition, differential diagnosis, treatment plan and final disposition.      Differential diagnosis includes but is not limited to:  Covid pneumonia  Bacterial pneumonia  CHF  Pulmonary embolus      ED Course as of 01/02/22 2215   Sun Jan 02, 2022 2129 C-Reactive Protein(!): 25.13 [JR]   2129 Procalcitonin(!): 2.38 [JR]   2129 WBC: 10.20 [JR]   2129 Hemoglobin: 14.2 [JR]   2143 EKG          EKG time: 2102  Rhythm/Rate: Sinus rhythm, 99  P waves and AL: Normal  QRS, axis: Low voltage, normal axis  ST and T waves: No acute ischemic changes    Interpreted Contemporaneously by me, independently viewed         [JR]   2145 Chest x-ray independently interpreted PACs.  There are patchy perihilar and peripheral opacities bilaterally consistent with COVID-19 pneumonia. [JR]   2150 Patient with  suspected Covid pneumonia however procalcitonin is elevated therefore I administered empiric Rocephin in addition to the 6 mg dexamethasone IV. [JR]   2213 Discussed with LUZ Hamilton for [facility], who agrees admit on behalf of Dr. Siegel. [JR]   2214 Patient reassessed.  He is currently stable on nonrebreather mask with O2 saturation 94%. [JR]   2215 SpO2: 94 % [JR]      ED Course User Index  [JR] Krishna Hebert MD              I wore an N95 mask, face shield, and gloves during this patient encounter.  Patient also wearing a surgical mask.  Hand hygeine performed before and after seeing the patient.    DIAGNOSIS  Final diagnoses:   Pneumonia due to COVID-19 virus   Acute respiratory failure with hypoxia (HCC)         DISPOSITION  ADMIT            Latest Documented Vital Signs:  As of 22:15 EST  BP- 139/79 HR- 89 Temp- 100 °F (37.8 °C) (Oral) O2 sat- 94%        --    Please note that portions of this were completed with a voice recognition program.          Krishna Hebert MD  01/02/22 2215      Electronically signed by Krishna Hebert MD at 01/02/22 2215     Jenny Jackson RN at 01/02/22 2348        Spoke to pt's brother about his condition.      Jenny Jackson RN  01/02/22 2348      Electronically signed by Jenny Jackson RN at 01/02/22 2348       Vital Signs (last day)     Date/Time Temp Temp src Pulse Resp BP Patient Position SpO2    01/03/22 1331 -- -- 65 -- 129/86 -- 98    01/03/22 1301 -- -- 68 -- 126/82 -- 97    01/03/22 1231 -- -- 69 -- 136/69 -- 97    01/03/22 1203 -- -- 68 -- -- -- 96    01/03/22 1201 -- -- 65 -- 131/89 -- 90    01/03/22 1155 -- -- -- 18 -- -- 96    01/03/22 1133 -- -- 65 -- -- -- 89    01/03/22 1132 -- -- 66 -- -- -- 85    01/03/22 1131 -- -- 69 -- 125/78 -- 86    01/03/22 0431 -- -- 59 -- 119/85 -- 95    01/03/22 0401 -- -- 62 -- 124/83 -- 91    01/03/22 0331 -- -- 63 -- 126/83 -- 95    01/03/22 0301 -- -- 64 -- 123/83 -- 95    01/03/22 0231 -- -- 64 -- 103/85 -- 95     01/03/22 0201 -- -- 68 -- 123/77 -- 94    01/03/22 0131 -- -- -- -- 124/75 -- --    01/03/22 0130 -- -- 68 -- -- -- 96    01/03/22 0101 -- -- 68 -- 131/80 -- 96    01/03/22 0031 -- -- 71 -- 132/80 -- 95    01/03/22 0001 -- -- 75 -- 125/74 -- 94    01/02/22 2331 -- -- 75 -- 136/81 -- 95    01/02/22 2301 -- -- 79 -- 132/78 -- 92    01/02/22 2231 -- -- 81 -- 127/74 -- 95    01/02/22 2201 -- -- 85 -- 137/78 -- 97    01/02/22 2131 -- -- 89 -- 139/79 -- 94    01/02/22 2030 100 (37.8) Oral -- -- -- -- --    01/02/22 2029 -- -- 99 -- 141/74 Lying 93    01/02/22 2025 -- -- 98 22 -- -- 91    01/02/22 2008 -- -- -- -- -- -- 70          Oxygen Therapy (last day)     Date/Time SpO2 Device (Oxygen Therapy) Flow (L/min) Oxygen Concentration (%) ETCO2 (mmHg)    01/03/22 1331 98 -- -- -- --    01/03/22 1301 97 -- -- -- --    01/03/22 1231 97 -- -- -- --    01/03/22 1203 96 -- -- -- --    01/03/22 1201 90 -- -- -- --    01/03/22 1155 96 heated; humidified; high-flow nasal cannula 60 100 --    01/03/22 1134 -- high-flow nasal cannula; high-flow mask 30 -- --    01/03/22 1133 89 -- -- -- --    01/03/22 1132 85 -- -- -- --    01/03/22 1131 86 -- -- -- --    01/03/22 0431 95 -- -- -- --    01/03/22 0401 91 -- -- -- --    01/03/22 0331 95 -- -- -- --    01/03/22 0301 95 -- -- -- --    01/03/22 0231 95 -- -- -- --    01/03/22 0201 94 -- -- -- --    01/02/22 2301 92 -- -- -- --    01/02/22 2231 95 -- -- -- --    01/02/22 2201 97 -- -- -- --    01/02/22 2131 94 -- -- -- --    01/02/22 2029 93 nonrebreather mask 15 -- --    01/02/22 2025 91 nonrebreather mask 15 -- --    01/02/22 2008 70 room air -- -- --          Intake & Output (last day)       01/02 0701 01/03 0700 01/03 0701 01/04 0700    IV Piggyback 490     Total Intake(mL/kg) 490 (4.4)     Net +490               Lines, Drains & Airways     Active LDAs     Name Placement date Placement time Site Days    Peripheral IV 01/02/22 2023 Left Antecubital 01/02/22 2023  Antecubital  less  than 1                Lab Results (last 24 hours)     Procedure Component Value Units Date/Time    Troponin [[id number]] Collected: 01/03/22 1340    Specimen: Blood Updated: 01/03/22 1345    Vitamin D 25 Hydroxy [[id number]]  (Normal) Collected: 01/02/22 2019    Specimen: Blood Updated: 01/03/22 0957     25 Hydroxy, Vitamin D 50.8 ng/ml     Narrative:      Reference Range for Total Vitamin D 25(OH)     Deficiency <20.0 ng/mL   Insufficiency 21-29 ng/mL   Sufficiency  ng/mL  Toxicity >100 ng/ml    Results may be falsely increased if patient taking Biotin.      Comprehensive Metabolic Panel [[id number]]  (Abnormal) Collected: 01/03/22 0207    Specimen: Blood Updated: 01/03/22 0250     Glucose 133 mg/dL      BUN 17 mg/dL      Creatinine 1.18 mg/dL      Sodium 135 mmol/L      Potassium 3.8 mmol/L      Chloride 100 mmol/L      CO2 22.2 mmol/L      Calcium 8.4 mg/dL      Total Protein 6.2 g/dL      Albumin 2.80 g/dL      ALT (SGPT) 38 U/L      AST (SGOT) 54 U/L      Alkaline Phosphatase 64 U/L      Total Bilirubin 0.4 mg/dL      eGFR Non African Amer 64 mL/min/1.73      Globulin 3.4 gm/dL      A/G Ratio 0.8 g/dL      BUN/Creatinine Ratio 14.4     Anion Gap 12.8 mmol/L     Narrative:      GFR Normal >60  Chronic Kidney Disease <60  Kidney Failure <15      Troponin [[id number]]  (Normal) Collected: 01/03/22 0207    Specimen: Blood Updated: 01/03/22 0250     Troponin T <0.010 ng/mL     Narrative:      Troponin T Reference Range:  <= 0.03 ng/mL-   Negative for AMI  >0.03 ng/mL-     Abnormal for myocardial necrosis.  Clinicians would have to utilize clinical acumen, EKG, Troponin and serial changes to determine if it is an Acute Myocardial Infarction or myocardial injury due to an underlying chronic condition.       Results may be falsely decreased if patient taking Biotin.      D-dimer, Quantitative [[id number]]  (Abnormal) Collected: 01/03/22 0207    Specimen: Blood Updated: 01/03/22 0244     D-Dimer, Quantitative 2.26  MCGFEU/mL     Narrative:      The Stago D-Dimer test used in conjunction with a clinical pretest probability (PTP) assessment model, has been approved by the FDA to rule out the presence of venous thromboembolism (VTE) in outpatients suspected of deep venous thrombosis (DVT) or pulmonary embolism (PE). The cut-off for negative predictive value is <0.50 MCGFEU/mL.    Lactic Acid, Plasma [[id number]]  (Normal) Collected: 01/03/22 0207    Specimen: Blood Updated: 01/03/22 0235     Lactate 1.6 mmol/L     CBC Auto Differential [[id number]]  (Abnormal) Collected: 01/03/22 0207    Specimen: Blood Updated: 01/03/22 0234     WBC 8.23 10*3/mm3      RBC 4.38 10*6/mm3      Hemoglobin 12.2 g/dL      Hematocrit 36.4 %      MCV 83.1 fL      MCH 27.9 pg      MCHC 33.5 g/dL      RDW 13.5 %      RDW-SD 40.9 fl      MPV 10.1 fL      Platelets 292 10*3/mm3      Neutrophil % 89.9 %      Lymphocyte % 5.5 %      Monocyte % 2.7 %      Eosinophil % 0.0 %      Basophil % 0.1 %      Immature Grans % 1.8 %      Neutrophils, Absolute 7.40 10*3/mm3      Lymphocytes, Absolute 0.45 10*3/mm3      Monocytes, Absolute 0.22 10*3/mm3      Eosinophils, Absolute 0.00 10*3/mm3      Basophils, Absolute 0.01 10*3/mm3      Immature Grans, Absolute 0.15 10*3/mm3      nRBC 0.0 /100 WBC     [place] Draw [[id number]] Collected: 01/02/22 2019    Specimen: Blood Updated: 01/03/22 0030    Narrative:      The following orders were created for panel order [place] Draw.  Procedure                               Abnormality         Status                     ---------                               -----------         ------                     Green Top (Gel)[[id number]]                                  Final result               Lavender Top[[id number]]                                     Final result               Gold Top - SST[[id number]]                                   Final result               [place] Blood Culture Shaji...[[id number]]                      Final result                Carr Top[[id number]]                                         Final result               Light Blue Top[[id number]]                                   Final result                 Please view results for these tests on the individual orders.    Carr Top [[id number]] Collected: 01/02/22 2019    Specimen: Blood Updated: 01/03/22 0030     Extra Tube Hold for add-ons.     Comment: Auto resulted.       Ferritin [[id number]]  (Abnormal) Collected: 01/02/22 2019    Specimen: Blood Updated: 01/03/22 0001     Ferritin 3,621.00 ng/mL     Narrative:      Results may be falsely decreased if patient taking Biotin.      Hepatic Function Panel [[id number]]  (Abnormal) Collected: 01/02/22 2019    Specimen: Blood Updated: 01/02/22 2309     Total Protein 6.4 g/dL      Albumin 3.50 g/dL      ALT (SGPT) 46 U/L      Comment: Specimen hemolyzed.  Results may be affected.        AST (SGOT) 72 U/L      Comment: Specimen hemolyzed.  Results may be affected.        Alkaline Phosphatase 76 U/L      Total Bilirubin 0.8 mg/dL      Bilirubin, Direct 0.2 mg/dL      Comment: Specimen hemolyzed. Results may be affected.        Bilirubin, Indirect 0.6 mg/dL     Lactate Dehydrogenase [[id number]]  (Abnormal) Collected: 01/02/22 2019    Specimen: Blood Updated: 01/02/22 2309      U/L      Comment: Specimen hemolyzed.  Results may be affected.       CK [[id number]]  (Abnormal) Collected: 01/02/22 2019    Specimen: Blood Updated: 01/02/22 2306     Creatine Kinase 1,229 U/L     C-reactive Protein [[id number]]  (Abnormal) Collected: 01/02/22 2019    Specimen: Blood Updated: 01/02/22 2306     C-Reactive Protein 26.49 mg/dL     Fibrinogen [[id number]]  (Abnormal) Collected: 01/02/22 2019    Specimen: Blood Updated: 01/02/22 2306     Fibrinogen 960 mg/dL     D-dimer, Quantitative [[id number]]  (Abnormal) Collected: 01/02/22 2019    Specimen: Blood Updated: 01/02/22 2220     D-Dimer, Quantitative 2.50 MCGFEU/mL     Narrative:      The Stago D-Dimer  test used in conjunction with a clinical pretest probability (PTP) assessment model, has been approved by the FDA to rule out the presence of venous thromboembolism (VTE) in outpatients suspected of deep venous thrombosis (DVT) or pulmonary embolism (PE). The cut-off for negative predictive value is <0.50 MCGFEU/mL.    Comprehensive Metabolic Panel [[id number]]  (Abnormal) Collected: 01/02/22 2019    Specimen: Blood Updated: 01/02/22 2152     Glucose 128 mg/dL      BUN 15 mg/dL      Creatinine 1.17 mg/dL      Sodium 136 mmol/L      Potassium 3.9 mmol/L      Comment: Specimen hemolyzed.  Results may be affected.        Chloride 96 mmol/L      CO2 19.8 mmol/L      Calcium 8.6 mg/dL      Total Protein 6.2 g/dL      Albumin 3.50 g/dL      ALT (SGPT) 46 U/L      Comment: Specimen hemolyzed.  Results may be affected.        AST (SGOT) 69 U/L      Comment: Specimen hemolyzed.  Results may be affected.        Alkaline Phosphatase 74 U/L      Total Bilirubin 0.8 mg/dL      eGFR Non African Amer 64 mL/min/1.73      Globulin 2.7 gm/dL      A/G Ratio 1.3 g/dL      BUN/Creatinine Ratio 12.8     Anion Gap 20.2 mmol/L     Narrative:      GFR Normal >60  Chronic Kidney Disease <60  Kidney Failure <15      Troponin [[id number]]  (Normal) Collected: 01/02/22 2019    Specimen: Blood Updated: 01/02/22 2143     Troponin T <0.010 ng/mL     Narrative:      Troponin T Reference Range:  <= 0.03 ng/mL-   Negative for AMI  >0.03 ng/mL-     Abnormal for myocardial necrosis.  Clinicians would have to utilize clinical acumen, EKG, Troponin and serial changes to determine if it is an Acute Myocardial Infarction or myocardial injury due to an underlying chronic condition.       Results may be falsely decreased if patient taking Biotin.      COVID-19,[facility] IN-HOUSE CEPHEID/EDWINA NP SWAB IN TRANSPORT MEDIA 8-12 HR TAT - Swab, Nasopharynx [[id number]]  (Abnormal) Collected: 01/02/22 2027    Specimen: Swab from Nasopharynx Updated: 01/02/22 2142      COVID19 Detected    Narrative:      Fact sheet for providers: https://www.fda.gov/media/774773/download     Fact sheet for patients: https://www.fda.gov/media/388240/download    Lavender Top [[id number]] Collected: 01/02/22 2019    Specimen: Blood Updated: 01/02/22 2130     Extra Tube hold for add-on     Comment: Auto resulted       [place] Blood Culture Bottle Set [[id number]] Collected: 01/02/22 2019    Specimen: Blood from Arm, Left Updated: 01/02/22 2130     Extra Tube Hold for add-ons.     Comment: Auto resulted.       Light Blue Top [[id number]] Collected: 01/02/22 2019    Specimen: Blood Updated: 01/02/22 2130     Extra Tube hold for add-on     Comment: Auto resulted       Green Top (Gel) [[id number]] Collected: 01/02/22 2019    Specimen: Blood Updated: 01/02/22 2130     Extra Tube Hold for add-ons.     Comment: Auto resulted.       Gold Top - SST [[id number]] Collected: 01/02/22 2019    Specimen: Blood Updated: 01/02/22 2130     Extra Tube Hold for add-ons.     Comment: Auto resulted.       C-reactive Protein [[id number]]  (Abnormal) Collected: 01/02/22 2019    Specimen: Blood Updated: 01/02/22 2118     C-Reactive Protein 25.13 mg/dL     Procalcitonin [[id number]]  (Abnormal) Collected: 01/02/22 2019    Specimen: Blood Updated: 01/02/22 2108     Procalcitonin 2.38 ng/mL     Narrative:      As a Marker for Sepsis (Non-Neonates):     1. <0.5 ng/mL represents a low risk of severe sepsis and/or septic shock.  2. >2 ng/mL represents a high risk of severe sepsis and/or septic shock.    As a Marker for Lower Respiratory Tract Infections that require antibiotic therapy:  PCT on Admission     Antibiotic Therapy             6-12 Hrs later  >0.5                          Strongly Recommended            >0.25 - <0.5             Recommended  0.1 - 0.25                  Discouraged                       Remeasure/reassess PCT  <0.1                         Strongly Discouraged         Remeasure/reassess PCT      As 28 day  "mortality risk marker: \"Change in Procalcitonin Result\" (>80% or <=80%) if Day 0 (or Day 1) and Day 4 values are available. Refer to http://www.[facility].mobiCancer [facility]saChegongfangpct-calculator.com/    Change in PCT <=80 %   A decrease of PCT levels below or equal to 80% defines a positive change in PCT test result representing a higher risk for 28-day all-cause mortality of patients diagnosed with severe sepsis or septic shock.    Change in PCT >80 %   A decrease of PCT levels of more than 80% defines a negative change in PCT result representing a lower risk for 28-day all-cause mortality of patients diagnosed with severe sepsis or septic shock.                BNP [[id number]]  (Normal) Collected: 01/02/22 2019    Specimen: Blood Updated: 01/02/22 2058     proBNP 355.9 pg/mL     Narrative:      Among patients with dyspnea, NT-proBNP is highly sensitive for the detection of acute congestive heart failure. In addition NT-proBNP of <300 pg/ml effectively rules out acute congestive heart failure with 99% negative predictive value.    Results may be falsely decreased if patient taking Biotin.      CBC & Differential [[id number]]  (Abnormal) Collected: 01/02/22 2019    Specimen: Blood Updated: 01/02/22 2049    Narrative:      The following orders were created for panel order CBC & Differential.  Procedure                               Abnormality         Status                     ---------                               -----------         ------                     CBC Auto Differential[[id number]]        Abnormal            Final result                 Please view results for these tests on the individual orders.    CBC Auto Differential [[id number]]  (Abnormal) Collected: 01/02/22 2019    Specimen: Blood Updated: 01/02/22 2049     WBC 10.20 10*3/mm3      RBC 5.03 10*6/mm3      Hemoglobin 14.2 g/dL      Hematocrit 40.9 %      MCV 81.3 fL      MCH 28.2 pg      MCHC 34.7 g/dL      RDW 13.5 %      RDW-SD 39.0 fl      MPV 10.4 fL      Platelets 329 " 10*3/mm3      Neutrophil % 90.0 %      Lymphocyte % 6.1 %      Monocyte % 1.9 %      Eosinophil % 0.0 %      Basophil % 0.2 %      Immature Grans % 1.8 %      Neutrophils, Absolute 9.19 10*3/mm3      Lymphocytes, Absolute 0.62 10*3/mm3      Monocytes, Absolute 0.19 10*3/mm3      Eosinophils, Absolute 0.00 10*3/mm3      Basophils, Absolute 0.02 10*3/mm3      Immature Grans, Absolute 0.18 10*3/mm3      nRBC 0.0 /100 WBC     Blood Culture - Blood, Arm, Left [[id number]] Collected: 01/02/22 2027    Specimen: Blood from Arm, Left Updated: 01/02/22 2039        Imaging Results (Last 24 Hours)     Procedure Component Value Units Date/Time    CT Angiogram Chest With & Without Contrast [[id number]] Collected: 01/03/22 0746     Updated: 01/03/22 1302    Narrative:      EXAMINATION: CT ANGIOGRAM OF THE CHEST WITH CONTRAST     HISTORY: 56-year-old male with a history of shortness of air and COVID  pneumonia, rule out pulmonary embolism.     TECHNIQUE: Contiguous axial images were obtained through the chest  following bolus intravenous administration of nonionic contrast. Three-D  reformatted images were produced and presented for interpretation.     COMPARISON: Chest x-ray, 01/02/2022.     FINDINGS: No filling defects are seen within the pulmonary arterial  system to the level of the subsegmental pulmonary arteries. The RV to LV  ratio is less than 1. There are groundglass opacities mixed with areas  of patchy consolidation that are seen throughout the bilateral upper  lobes, right middle lobe, lingula and bilateral lower lobes. There are  some areas of normal aeration seen in the right upper lobe, right middle  lobe and right lower lobe and to a lesser degree the left upper lobe and  left lower lobe. No evidence for pleural effusion is appreciated. No  evidence for mediastinal or hilar adenopathy is appreciated. The  visualized soft tissue structures of the upper abdomen appear normal.  Mild colonic diverticulosis is noted.        "Impression:      1. There is no evidence for pulmonary embolism.  2. There are multilobar diffuse bilateral patchy groundglass and  consolidative infiltrates seen involving a portion of all lobes of both  lungs. This is consistent with diffuse multilobar bilateral  pneumonia/COVID pneumonia.     Radiation dose reduction techniques were utilized, including automated  exposure control and exposure modulation based on body size.     This report was finalized on 1/3/2022 12:59 PM by Dr. Torey Lebron M.D.       XR Chest 1 View [[id number]] Collected: 01/02/22 2102     Updated: 01/02/22 2106    Narrative:      CHEST SINGLE VIEW     HISTORY: COVID-19. Shortness of air.     COMPARISON: Two-view chest 12/31/2021.     FINDINGS: There are patchy bilateral areas of increased interstitial and  hazy airspace disease consistent with COVID-19 infiltrates. Infiltrates  appear somewhat more dense and confluent as compared to the previous  exam though without evidence for progression in distribution. No  perihilar edema or pneumothorax or pneumomediastinum is evident. Cardiac  monitoring leads are noted. The heart size is borderline enlarged.       Impression:      Patchy bilateral pulmonary infiltrates with mild increase in  density and consolidation when compared to the previous exam. No other  change.     This report was finalized on 1/2/2022 9:03 PM by Dr. Chidi Ruano M.D.           Orders (last 24 hrs)      Start     Ordered    01/04/22 0600  Procalcitonin  Morning Draw         01/03/22 1202    01/04/22 0600  Troponin  Morning Draw         01/03/22 1220    01/03/22 2237  remdesivir 100 mg in sodium chloride 0.9 % 270 mL IVPB (powder vial)  Every 24 Hours        \"Followed by\" Linked Group Details    01/02/22 2236    01/03/22 2100  cefTRIAXone (ROCEPHIN) 1 g in sodium chloride 0.9 % 100 mL IVPB-VTB  Every 24 Hours         01/03/22 0648    01/03/22 1630  Oscillating Positive Expiratory Pressure (OPEP)  4 Times Daily - RT    "    01/03/22 1241    01/03/22 1222  Inpatient Cardiology Consult  Once        Specialty:  Cardiology  Provider:  (Not yet assigned)    01/03/22 1221    01/03/22 1216  Troponin  Once         01/03/22 1215    01/03/22 1000  cholecalciferol (VITAMIN D3) tablet 1,000 Units  Daily         01/03/22 0855    01/03/22 0900  famotidine (PEPCID) tablet 20 mg  Daily         01/02/22 2236 01/03/22 0900  dexamethasone (DECADRON) tablet 6 mg  Every 12 Hours Scheduled         01/03/22 0608    01/03/22 0856  Vitamin D 25 Hydroxy  Once         01/03/22 0855    01/03/22 0855  Code Status and Medical Interventions:  Continuous         01/03/22 0854    01/03/22 0800  dexamethasone (DECADRON) tablet 6 mg  Daily With Breakfast,   Status:  Discontinued         01/02/22 2236 01/03/22 0654  iopamidol (ISOVUE-370) 76 % injection 100 mL  Once in Imaging         01/03/22 0652    01/03/22 0611  Inpatient Infectious Diseases Consult  Once        Specialty:  Infectious Diseases  Provider:  Gregory Barnes MD    01/03/22 0610    01/03/22 0608  CT Angiogram Chest With & Without Contrast  1 Time Imaging,   Status:  Canceled         01/03/22 0607    01/03/22 0608  CT Angiogram Chest With & Without Contrast  1 Time Imaging         01/03/22 0607    01/03/22 0600  Incentive Spirometry  Every 4 Hours While Awake       01/02/22 2236 01/03/22 0600  CBC Auto Differential  Daily       01/02/22 2236 01/03/22 0600  CK  Daily       01/02/22 2236 01/03/22 0600  Comprehensive Metabolic Panel  Daily       01/02/22 2236 01/03/22 0600  Ferritin  Daily       01/02/22 2236 01/03/22 0600  Lactic Acid, Plasma  Morning Draw         01/02/22 2236 01/03/22 0600  Troponin  Morning Draw         01/02/22 2236 01/03/22 0600  C-reactive Protein  Daily       01/02/22 2236 01/03/22 0600  Lactate Dehydrogenase  Every 48 Hours       01/02/22 2236 01/03/22 0600  D-dimer, Quantitative  Every 48 Hours       01/02/22 2236 01/03/22 0600   "Fibrinogen  Every 48 Hours       01/02/22 2236 01/03/22 0600  Bilirubin, Direct  PROCEDURE ONCE         01/02/22 2236    01/03/22 0000  Vital Signs  Every 4 Hours      Comments: Per per hospital policy    01/02/22 2236 01/02/22 2330  enoxaparin (LOVENOX) syringe 40 mg  Every 24 Hours         01/02/22 2314    01/02/22 2321  Inpatient Pulmonology Consult  Once        Specialty:  Pulmonary Disease  Provider:  Satnam Ortiz MD    01/02/22 2320 01/02/22 2238  remdesivir 200 mg in sodium chloride 0.9 % 290 mL IVPB (powder vial)  Every 24 Hours        \"Followed by\" Linked Group Details    01/02/22 2236 01/02/22 2237  Hepatic Function Panel  Daily       01/02/22 2236 01/02/22 2237  Creatinine, Serum  Daily       01/02/22 2236 01/02/22 2236  Pharmacy Consult - Remdesivir  Continuous PRN,   Status:  Discontinued         01/02/22 2236 01/02/22 2236  Code Status and Medical Interventions:  Continuous,   Status:  Canceled         01/02/22 2236 01/02/22 2236  Cardiac Monitoring  Continuous,   Status:  Canceled         01/02/22 2236 01/02/22 2236  Telemetry - Maintain IV Access  Continuous         01/02/22 2236 01/02/22 2236  Continuous Cardiac Monitoring  Continuous         01/02/22 2236 01/02/22 2236  Telemetry - Pulse Oximetry  Continuous        Comments: If patient develops increasing oxygen requirements exceed 4 L/m call attending immediately.    01/02/22 2236 01/02/22 2236  May Be Off Telemetry for Tests  Continuous         01/02/22 2236 01/02/22 2236  ACLS Protocol For Life Threatening Dysrhythmias (Unless Code Status Indicates Otherwise)  Continuous         01/02/22 2236 01/02/22 2236  Notify Provider if ACLS Protocol Activated  Until Discontinued         01/02/22 2236 01/02/22 2236  Notify Provider (Specify Parameters)  Until Discontinued        Comments: If requires more than 4 L/m oxygen to keep sat 90% or greater    01/02/22 2236 01/02/22 2236  Patient Isolation " "Enhanced Droplet / Contact  Continuous         01/02/22 2236 01/02/22 2236  Activity - Ad [place]  Until Discontinued         01/02/22 2236 01/02/22 2236  Diet Regular; Cardiac  Diet Effective Now         01/02/22 2236 01/02/22 2236  Intake & Output  Every Shift       01/02/22 2236 01/02/22 2236  Oxygen Therapy- Nasal Cannula; Titrate for SPO2: 94%, Greater Than or Equal To  Continuous         01/02/22 2236 01/02/22 2236  Troponin  Once,   Status:  Canceled         01/02/22 2236 01/02/22 2235  ondansetron (ZOFRAN) tablet 4 mg  Every 4 Hours PRN        \"Or\" Linked Group Details    01/02/22 2236 01/02/22 2235  ondansetron (ZOFRAN) injection 4 mg  Every 4 Hours PRN        \"Or\" Linked Group Details    01/02/22 2236 01/02/22 2235  melatonin tablet 3 mg  Nightly PRN         01/02/22 2236 01/02/22 2235  Pharmacy to Dose enoxaparin (LOVENOX)  Continuous PRN,   Status:  Discontinued         01/02/22 2236 01/02/22 2235  acetaminophen (TYLENOL) tablet 650 mg  Every 4 Hours PRN         01/02/22 2236 01/02/22 2235  Oxygen Therapy- Nasal Cannula; Titrate for SPO2: 90% - 95%  Continuous PRN,   Status:  Canceled      Comments: If Patient Develops Unresponsiveness, Acute Dyspnea, Cyanosis or Suspected Hypoxemia Start Continuous Pulse Ox Monitoring, Apply Oxygen & Notify Provider    01/02/22 2236 01/02/22 2235  nitroglycerin (NITROSTAT) SL tablet 0.4 mg  Every 5 Minutes PRN         01/02/22 2236 01/02/22 2215  Critical Care  Once        Comments: This order was created via procedure documentation    01/02/22 2214 01/02/22 2214  Inpatient Admission  Once         01/02/22 2213 01/02/22 2213  Blood Culture - Blood,  Once,   Status:  Canceled         01/02/22 2213 01/02/22 2153  [facility] (on-call MD unless specified) Details  Once        Specialty:  Hospitalist  Provider:  (Not yet assigned)    01/02/22 2152 01/02/22 2152  cefTRIAXone (ROCEPHIN) 1 g in sodium chloride 0.9 % 100 mL IVPB-VTB  Once  "        01/02/22 2150 01/02/22 2123  Comprehensive Metabolic Panel  Once         01/02/22 2017 01/02/22 2123  Troponin  Once         01/02/22 2017 01/02/22 2114  D-dimer, Quantitative  Once         01/02/22 2113 01/02/22 2040  XR Chest 1 View  1 Time Imaging         01/02/22 2039 01/02/22 2034  dexamethasone (DECADRON) injection 6 mg  Once         01/02/22 2032 01/02/22 2033  Procalcitonin  Once         01/02/22 2032 01/02/22 2033  C-reactive Protein  Once         01/02/22 2032 01/02/22 2033  ECG 12 Lead  Once         01/02/22 2032 01/02/22 2023  Blood Culture - Blood, Arm, Left  Once         01/02/22 2022 01/02/22 2018  COVID-19,[facility] IN-HOUSE CEPHEID/EDWINA NP SWAB IN TRANSPORT MEDIA 8-12 HR TAT - Swab, Nasopharynx  Once         01/02/22 2018 01/02/22 2016  NPO Diet  Diet Effective Now,   Status:  Canceled         01/02/22 2017 01/02/22 2016  Undress and Gown  Once         01/02/22 2017 01/02/22 2016  Cardiac Monitoring  Per Hospital Policy,   Status:  Canceled         01/02/22 2017 01/02/22 2016  Continuous Pulse Oximetry  Continuous,   Status:  Canceled         01/02/22 2017 01/02/22 2016  Vital Signs  Per Hospital Policy         01/02/22 2017 01/02/22 2016  ECG 12 Lead  Once         01/02/22 2017    01/02/22 2016  XR Chest 2 View  1 Time Imaging,   Status:  Canceled         01/02/22 2017 01/02/22 2016  Insert Peripheral IV  Once         01/02/22 2017 01/02/22 2016  [place] Draw  Once         01/02/22 2017 01/02/22 2016  CBC & Differential  Once         01/02/22 2017 01/02/22 2016  BNP  Once         01/02/22 2017    01/02/22 2016  Green Top (Gel)  PROCEDURE ONCE         01/02/22 2017 01/02/22 2016  Lavender Top  PROCEDURE ONCE         01/02/22 2017 01/02/22 2016  Gold Top - SST  PROCEDURE ONCE         01/02/22 2017 01/02/22 2016  [place] Blood Culture Bottle Set  PROCEDURE ONCE         01/02/22 2017    01/02/22 2016  Gray Top  PROCEDURE ONCE          22 2016  Light Blue Top  PROCEDURE ONCE         22  CBC Auto Differential  PROCEDURE ONCE         22  Oxygen Therapy- Nasal Cannula; 2 LPM; Titrate for SPO2: equal to or greater than, 92%  Continuous PRN,   Status:  Canceled       22  sodium chloride 0.9 % flush 10 mL  As Needed         22    Unscheduled  ECG 12 Lead  As Needed      Comments: Nurse to Release if Patient Expericences Acute Chest Pain or Dysrhythmias    22    Unscheduled  Potassium  As Needed      Comments: For Ventricular Arrhythmias      22    Unscheduled  Magnesium  As Needed      Comments: For Ventricular Arrhythmias      22    Unscheduled  Troponin  As Needed      Comments: For Chest Pain      22    Unscheduled  Blood Gas, Arterial -  As Needed      Comments: Per O2 PolicyNotify Physician      22    --  ivermectin (STROMECTOL) 3 MG tablet tablet  Once         220                     Physician Progress Notes (last 24 hours)      John Maher MD at 22 1235              [facility] Medicine Services  PROGRESS NOTE    Patient Name: Renaldo Rodriguez  : 1965  MRN: [id number]    Date of Admission: 2022  Primary Care Physician: Provider, No Known    Subjective   Subjective     CC:  Follow-up Covid pneumonia    HPI:  Patient feels severely short of breath.  He notes he gets very short of breath with very limited movement.  He notes a significant productive cough.  Speaking also makes him short of breath.  He agrees with plan for dexamethasone steroid therapy and remdesivir antiviral therapy.  He notes again he is unvaccinated.  He reports he was taking ivermectin at home to try and cure his Covid.  He feels exceptionally weak and has poor appetite    Review of Systems  No current headache or lightheadedness  No current nausea, vomiting, or  diarrhea  No current chest pain or palpitations      Objective   Objective     Vital Signs:   Temp:  [100 °F (37.8 °C)] 100 °F (37.8 °C)  Heart Rate:  [59-99] 65  Resp:  [18-22] 18  BP: (103-141)/(74-85) 125/78        Physical Exam:  Constitutional:Awake, alert, ill-appearing  HENT: NCAT, mucous membranes moist, neck supple  Respiratory: Coarse respiratory sounds, cough is noted, breathing is labored, tachypneic, currently on high oxygen needs  Cardiovascular: RRR, normal radial pulses  Gastrointestinal: Positive bowel sounds, soft, nontender, nondistended  Musculoskeletal: Significantly obese, BMI is 36, minimal lower extremity edema  Psychiatric: Anxious affect, cooperative, conversational  Neurologic: No slurred speech or facial droop, follows commands  Skin: No rashes or jaundice, warm      Results Reviewed:  Results from last 7 days   Lab Units 01/03/22  0207 01/02/22 2019   WBC 10*3/mm3 8.23 10.20   HEMOGLOBIN g/dL 12.2* 14.2   HEMATOCRIT % 36.4* 40.9   PLATELETS 10*3/mm3 292 329   PROCALCITONIN ng/mL  --  2.38*     Results from last 7 days   Lab Units 01/03/22  0207 01/02/22 2019   SODIUM mmol/L 135* 136   POTASSIUM mmol/L 3.8 3.9   CHLORIDE mmol/L 100 96*   CO2 mmol/L 22.2 19.8*   BUN mg/dL 17 15   CREATININE mg/dL 1.18 1.17   GLUCOSE mg/dL 133* 128*   CALCIUM mg/dL 8.4* 8.6   ALT (SGPT) U/L 38 46*  46*   AST (SGOT) U/L 54* 72*  69*   TROPONIN T ng/mL <0.010 <0.010   PROBNP pg/mL  --  355.9     Estimated Creatinine Clearance: 84.7 mL/min (by C-G formula based on SCr of 1.18 mg/dL).    Microbiology Results Abnormal     None          Imaging Results (Last 24 Hours)     Procedure Component Value Units Date/Time    CT Angiogram Chest With & Without Contrast [[id number]] Collected: 01/03/22 0746     Updated: 01/03/22 0746    Narrative:      EXAMINATION: CT ANGIOGRAM OF THE CHEST WITH CONTRAST     HISTORY: 56-year-old male with a history of shortness of air and COVID  pneumonia, rule out pulmonary embolism.      TECHNIQUE: Contiguous axial images were obtained through the chest  following bolus intravenous administration of nonionic contrast. Three-D  reformatted images were produced and presented for interpretation.     COMPARISON: Chest x-ray, 01/02/2022.     FINDINGS: No filling defects are seen within the pulmonary arterial  system to the level of the subsegmental pulmonary arteries. The RV to LV  ratio is less than 1. There are groundglass opacities mixed with areas  of patchy consolidation that are seen throughout the bilateral upper  lobes, right middle lobe, lingula and bilateral lower lobes. There are  some areas of normal aeration seen in the right upper lobe, right middle  lobe and right lower lobe and to a lesser degree the left upper lobe and  left lower lobe. No evidence for pleural effusion is appreciated. No  evidence for mediastinal or hilar adenopathy is appreciated. The  visualized soft tissue structures of the upper abdomen appear normal.  Mild colonic diverticulosis is noted.       Impression:      1. There is no evidence for pulmonary embolism.  2. There are multilobar diffuse bilateral patchy groundglass and  consolidative infiltrates seen involving a portion of all lobes of both  lungs. This is consistent with diffuse multilobar bilateral  pneumonia/COVID pneumonia.     Radiation dose reduction techniques were utilized, including automated  exposure control and exposure modulation based on body size.          XR Chest 1 View [[id number]] Collected: 01/02/22 2102     Updated: 01/02/22 2106    Narrative:      CHEST SINGLE VIEW     HISTORY: COVID-19. Shortness of air.     COMPARISON: Two-view chest 12/31/2021.     FINDINGS: There are patchy bilateral areas of increased interstitial and  hazy airspace disease consistent with COVID-19 infiltrates. Infiltrates  appear somewhat more dense and confluent as compared to the previous  exam though without evidence for progression in distribution. No  perihilar  edema or pneumothorax or pneumomediastinum is evident. Cardiac  monitoring leads are noted. The heart size is borderline enlarged.       Impression:      Patchy bilateral pulmonary infiltrates with mild increase in  density and consolidation when compared to the previous exam. No other  change.     This report was finalized on 1/2/2022 9:03 PM by Dr. Chidi Ruano M.D.                 I have reviewed the medications:  Scheduled Meds:cefTRIAXone, 1 g, Intravenous, Q24H  cholecalciferol, 1,000 Units, Oral, Daily  dexamethasone, 6 mg, Oral, Q12H  enoxaparin, 40 mg, Subcutaneous, Q24H  famotidine, 20 mg, Oral, Daily  remdesivir, 100 mg, Intravenous, Q24H      Continuous Infusions:   PRN Meds:.•  acetaminophen  •  melatonin  •  nitroglycerin  •  ondansetron **OR** ondansetron  •  sodium chloride    Assessment/Plan   Assessment & Plan     Active Hospital Problems    Diagnosis  POA   • **Pneumonia due to COVID-19 virus [U07.1, J12.82]  Yes   • Lymphocytopenia [D72.810]  Yes   • Acute transaminitis due to COVID-19 viral infection [B17.9]  Yes   • Class 2 severe obesity with serious comorbidity in adult (HCC) [E66.01]  Yes   • JOSHUA (obstructive sleep apnea) [G47.33]  Yes   • Bacterial pneumonia [J15.9]  Yes   • Abnormal EKG [R94.31]  Yes   • Acute respiratory failure with hypoxia and hypercapnia (HCC) [J96.01, J96.02]  Unknown   • Elevated liver enzymes [R74.8]  Yes   • Neuritis or radiculitis due to rupture of lumbar intervertebral disc [M51.16]  Yes      Resolved Hospital Problems    Diagnosis Date Resolved POA   • Metabolic acidosis [E87.2] 01/03/2022 Yes        Brief Hospital Course to date:  Renaldo Rodriguez is a 56 y.o. male presents to the hospital with COVID-19 pneumonia and secondary bacterial pneumonia.  Patient had been attempting to treat himself at home with equine agricultural ivermectin.    COVID-19 pneumonia with secondary respiratory failure, lymphocytopenia, transaminitis and bacterial  pneumonia:  Aggressive treatment for COVID-19 with dexamethasone and remdesivir.  Discontinue ivermectin.  Encourage self proning, advance oxygen to [employer].  May need BiPAP if worsens.  Discussed CODE STATUS with patient who adamantly says at this point he does not want to go on mechanical ventilation.  His CODE STATUS has been updated however I recommend he discuss this with his wife.  If patient worsens I recommend discussing further.  I updated his wife of his CODE STATUS preference and she plans to discuss with him further as well.  Lymphocytopenia is common with COVID-19.  Monitor.  Transaminitis likely related to Covid associated viral hepatitis.  Trend.  Supportive care and symptom treatment.  Flutter valve and incentive spirometer.  Ceftriaxone for bacterial pneumonia.  Trend inflammatory markers.  CT angiogram negative for pulmonary embolus.  GI prophylaxis with H2 blocker.    Obstructive sleep apnea: Further complicates issue.  Supplemental oxygen and pulse oximetry monitoring.  Patient has never been on CPAP.    Abnormal EKG:  EKG appears to have some ST depression in lateral leads.  There is also atrial enlargement noted.  Trend troponin.  Consult cardiology.  Patient may need ischemic work-up at some point if he recovers from his acute illness.    Obesity:  Further complicates illness.  Supportive care and symptom treatment.  Eventual weight loss recommended.    History of lumbar degenerative disc disease and neuropathic pain:  No longer taking gabapentin.  Tylenol as needed for now.    Metabolic acidosis: Noted on admission.  Monitor, improved..    DVT Prophylaxis: Lovenox    Disposition: Pending course    CODE STATUS:   Code Status and Medical Interventions:   Ordered at: 01/03/22 0854     Medical Intervention Limits:    NO intubation (DNI)     Level Of Support Discussed With:    Patient     Code Status (Patient has no pulse and is not breathing):    CPR (Attempt to Resuscitate)     Medical  Interventions (Patient has pulse or is breathing):    Limited Support       John Maher MD  01/03/22      Electronically signed by John Maher MD at 01/03/22 1245          Consult Notes (last 24 hours)      Tigre Ward DO at 01/03/22 1141      Consult Orders    1. Inpatient Infectious Diseases Consult [[id number]] ordered by Satnam Ortiz MD at 01/03/22 0610               Referring Provider: No ref. provider found  Reason for Consultation: COVID-19 pneumonia  Chief Complaint   Patient presents with   • Shortness of Breath       Subjective   History of present illness: Patient is a 56-year-old male who presents after developing worsening shortness of breath and cough.  Diagnosed with COVID-19 pneumonia and ID was consulted for evaluation of IL-6 inhibitor therapy.    Patient reports he developed fatigue around the time of [place] day and has slowly progressed to having a cough and shortness of breath since that time.  He reports that over the last 24 hours or so it has been severe with difficulty catching his breath.  He reports he was also developing fevers over the last couple days.  Denies any nausea, vomiting, or abdominal pain.  States he has had a couple episodes of loose bowel movements he considers diarrhea.  States his wife was also sick at home however much less severe.  States he has had a decreased appetite since having a recent right arm surgery.  He had a biceps tendon repair on 12/21.    He denies any history of smoking.  States he has had sleep apnea but does not use a CPAP machine.  Reports he has not been vaccinated against COVID-19.    Reports he was originally tested on 12/31 for COVID-19 which was positive and follow-up testing here on admission was also positive.  He has imaging consistent with bilateral infiltrates and markedly elevated CRP.  Procalcitonin also elevated to 2.38.    Past Medical History:   Diagnosis Date   • Injury of back     lifted  "something at work, playing golf   • Lumbar herniated disc    • Numbness and tingling of right lower extremity    • Right leg pain        Past Surgical History:   Procedure Laterality Date   • BICEPS TENDON REPAIR Right    • LUMBAR DISCECTOMY Right 10/4/2019    Procedure: Right lumbar 4 to lumbar 5 laminectomy and discectomy with metrx;  Surgeon: Ghanshyam Canas MD;  Location: [facility] OR;  Service: Neurosurgery   • NO PAST SURGERIES         family history is not on file.     reports that he has never smoked. He has never used smokeless tobacco. He reports current alcohol use. He reports that he does not use drugs.     No Known Allergies    Medication:  Antibiotics:  Anti-Infectives (From admission, onward)    Ordered     Dose/Rate Route Frequency Start Stop    01/02/22 2236  remdesivir 100 mg in sodium chloride 0.9 % 270 mL IVPB (powder vial)        Ordering Provider: Renaldo Cooper APRN   \"Followed by\" Linked Group Details    100 mg  over 60 Minutes Intravenous Every 24 Hours 01/03/22 2237 01/07/22 2237 01/03/22 0648  cefTRIAXone (ROCEPHIN) 1 g in sodium chloride 0.9 % 100 mL IVPB-VTB        Ordering Provider: John Maher MD    1 g  200 mL/hr over 30 Minutes Intravenous Every 24 Hours 01/03/22 2100 01/08/22 2059 01/02/22 2236  remdesivir 200 mg in sodium chloride 0.9 % 290 mL IVPB (powder vial)        Ordering Provider: Renaldo Cooper APRN   \"Followed by\" Linked Group Details    200 mg  over 60 Minutes Intravenous Every 24 Hours 01/02/22 2238 01/03/22 0359    01/02/22 2150  cefTRIAXone (ROCEPHIN) 1 g in sodium chloride 0.9 % 100 mL IVPB-VTB        Ordering Provider: Krishna Hebert MD    1 g  200 mL/hr over 30 Minutes Intravenous Once 01/02/22 2152 01/02/22 2323    01/02/22 2110  ivermectin (STROMECTOL) 3 MG tablet tablet        Ordering Provider: Porfirio Pugh MD    3 mg Oral Once --            Review of Systems  Pertinent items are noted in HPI, all other systems " reviewed and negative    Objective     Physical Exam:   Vital Signs   Temp:  [100 °F (37.8 °C)] 100 °F (37.8 °C)  Heart Rate:  [59-99] 65  Resp:  [22] 22  BP: (103-141)/(74-85) 125/78    GENERAL: Awake and alert, in no acute distress.   HEENT: Oropharynx is clear. Hearing is grossly normal.   EYES: PERRL. No conjunctival injection. No lid lag.   LYMPHATICS: No lymphadenopathy of the neck or inguinal regions.   HEART: Regular rate and regular rhythm. No peripheral edema.   LUNGS: Increased respiratory effort.  Currently on nonrebreather.  Bilateral lower rhonchi.  GI: Soft, nontender, nondistended. No appreciable organomegaly.   SKIN: Warm and dry without cutaneous eruptions   PSYCHIATRIC: Appropriate mood, affect, insight, and judgment.     Results Review:   I reviewed the patient's new clinical results.  I reviewed the patient's new imaging results and agree with the interpretation.  I reviewed the patient's other test results and agree with the interpretation    Lab Results   Component Value Date    WBC 8.23 01/03/2022    HGB 12.2 (L) 01/03/2022    HCT 36.4 (L) 01/03/2022    MCV 83.1 01/03/2022     01/03/2022       No results found for: MEEK COSTA VANCORANDOM    Lab Results   Component Value Date    GLUCOSE 133 (H) 01/03/2022    BUN 17 01/03/2022    CREATININE 1.18 01/03/2022    EGFRIFNONA 64 01/03/2022    BCR 14.4 01/03/2022    CO2 22.2 01/03/2022    CALCIUM 8.4 (L) 01/03/2022    ALBUMIN 2.80 (L) 01/03/2022    AST 54 (H) 01/03/2022    ALT 38 01/03/2022         Estimated Creatinine Clearance: 84.7 mL/min (by C-G formula based on SCr of 1.18 mg/dL).      Microbiology:  12/31 Covid positive  1/2 blood culture in process  1/2 Covid positive    Radiology:  12/31 chest x-ray reviewed by me with bilateral patchy infiltrates consistent with pneumonia.    1/2 chest x-ray reviewed by me with bilateral patchy infiltrates    1/3 CT angiogram of the chest without pulmonary emboli and findings of diffuse  "bilateral patchy groundglass opacities    Assessment/Plan   #Acute hypoxic respiratory failure in the setting of bilateral Covid pneumonia  #Bacterial pneumonia  #History of JOSHUA complicating the above    Given patient's elevated procalcitonin to 2.2 it would be difficult to recommend an IL-6 inhibitor at this time.  He has findings of bilateral pneumonia which are consistent with Covid pneumonia however it would be impossible to rule out a concomitant bacterial component given the elevation procalcitonin.    Agree with continuing IV remdesivir for 5 days and dexamethasone daily.  Also agree with ceftriaxone 1 g daily to cover for bacterial component of pneumonia.  Would plan for minimum 5 days of therapy.  We will plan to recheck procalcitonin tomorrow and follow daily LFTs, creatinine.    Thank you for this consult.  We will continue to follow along and tailor antibiotics as the patient's clinical course evolves.      Electronically signed by Tigre Ward DO at 01/03/22 1202     Satnam Ortiz MD at 01/03/22 0521      Consult Orders    1. Inpatient Pulmonology Consult [[id number]] ordered by Renaldo Cooper APRN at 01/02/22 2320               [facility]    Reason for Consult: AHRF    HPI:  Mr. Rodriguez is  57yo unvaccinated WM.  He started feeling unwell about [place] day.  He had \"flu like symptoms\" up until this past weekend he starting having shortness of breath, progressively increasing, became severe worse on exertion, some better at rest and he reports better now with oxygen.  He has been admitted here and is requiring a non rebreather at this point but sats are % on this and he is able to talk in complete sentences, he is not using accessory muscles and says he does not feel distressed.  He does not have chest pain or leg pain.  He does have some cough, mainly non productive.  He does report a history of JOSHUA but does not use pap therapy.  He has no history of asthma, he is a " lifelong nonsmoker.  He did not get any antibody infusion prior to admission.  CXR shows bilateral infiltrates and his CRP is very high.      Past Medical History:   Diagnosis Date   • Injury of back     lifted something at work, playing golf   • Lumbar herniated disc    • Numbness and tingling of right lower extremity    • Right leg pain      Social History     Socioeconomic History   • Marital status:    Tobacco Use   • Smoking status: Never Smoker   • Smokeless tobacco: Never Used   Substance and Sexual Activity   • Alcohol use: Yes     Comment: social   • Drug use: No   • Sexual activity: Defer     Family History   Problem Relation Age of Onset   • Malig Hyperthermia Neg Hx      MEDS: reviewed as per chart  ALL: nkda  ROS: 12 point negative except for some pain in the right arm after a recent surgery to repair a ruptured tendon    Vital Sign Min/Max for last 24 hours  Temp  Min: 100 °F (37.8 °C)  Max: 100 °F (37.8 °C)   BP  Min: 103/85  Max: 141/74   Pulse  Min: 59  Max: 99   Resp  Min: 22  Max: 22   SpO2  Min: 70 %  Max: 97 %   Flow (L/min)  Min: 15  Max: 15   Weight  Min: 111 kg (244 lb 11.4 oz)  Max: 111 kg (244 lb 11.4 oz)     GEN:   appears ill, obese, AxOx3  HEENT: PERRL, EOMI, normal sclera,  mmm, no jvd, trachea midline, neck supple  CHEST: fair ae bilat, no wheezes, + faint crackles, no use of accessory muscles  CV: RRR, no m/g/r  ABD: soft, nt, nd +bs, no hepatosplenomegaly  EXT: no c/c/e  SKIN: no rashes, no xanthomas, nl turgor, warm, dry  LYMPH: no palpable cervical or supraclavicular lymphadenopathy  NEURO: CN 2-12 intact and symmetric bilaterally  PSYCH: nl affect, nl orientation, nl judgement, nl mood  MSK: no kyphoscoliosis, 5/5 strength ue and le bilaterally    Labs: 1/3/2021: reviewed:  Wbc 8  hgb 12.2  plts 292  Glucose 133  Bun 17  Cr 1.18  Na 135  Bicarb 22  Lactate 1.6  Trop 0.01  D dimer 2.26  1/2: procal 2.38  Ck 1229  1/2: cxr: bilteral infiltrates    A/P:  1. AhRF -- high oxygen  requirements but sats are good right now and he doesn't appear to be in acute distress. Continue on non rebreather for now.    2. COVID19 pneumonia -- severe case -- he did get a dose of antibiotic and procal was elevated, will ask ID to see regarding IL-6 inhibitor given the elevated procal  3. Elevated d-dimer -- also just had surgery -- will get stat ct angio  4. JOSHUA -- doesn't use pap at home  5. Obesity        Electronically signed by Satnam Ortiz MD at 01/03/22 4381

## 2022-01-03 NOTE — H&P
Patient Name:  Renaldo Rodriguez  YOB: 1965  MRN:  [id number]  Admit Date:  1/2/2022  Patient Care Team:  Provider, No Known as PCP - General      Subjective   History Present Illness     Chief Complaint   Patient presents with   • Shortness of Breath       Mr. Rodriguez is a 56 y.o. male with a history of herniated disc of the lumbar spine and recent right bicep tendon rupture with repair that presents to [facility] complaining of increased shortness of air.  Mr. Rodriguez states that on December 21 he was trying to move some pallets at work and ruptured his right bicep tendon.  He had a repair done at the time was Covid negative per preprocedure testing.  He began feeling ill went to be evaluated at an Urgent Care. He was retested this past Friday was found to be Covid positive.  Prior to receiving his positive result, the provider at the urgent care center started him on doxycycline and and an inhaler.  Unfortunately, over the last 24 hours his SOA had increased and he became more distressed at home.  Currently he is receiving supplemental oxygen via nonrebreather 15 L with oxygen saturation around 93%.  Patient was 70% on room air when he presented to the emergency department.  Upon my assessment patient was dyspneic, tachypneic and in mild distress.  He became very short of air when answering yes or no questions.  Accessory muscles in use the entire time of assessment. He denies any chest tightness or chest pain at this time. He also denies any pain related to his recent surgery.  At this time patient educated on symptom management, the need for a blood thinning agent and the benefits of remdesivir infusion.  Patient agreeable to treatment plan.  Review of systems, physical exam, diagnostic data and plan are as outlined below.      History of Present Illness  Review of Systems   Constitutional: Positive for chills and fever. Negative for diaphoresis.   HENT: Negative for sore throat and  trouble swallowing.    Respiratory: Positive for shortness of breath. Negative for chest tightness.    Cardiovascular: Negative for chest pain.   Gastrointestinal: Positive for diarrhea and nausea. Negative for constipation and vomiting.   Endocrine: Negative for polydipsia.   Genitourinary: Negative for difficulty urinating.   Neurological: Positive for dizziness, weakness, light-headedness and headaches. Negative for tremors, syncope and speech difficulty.   Psychiatric/Behavioral: Negative for agitation and confusion.        Personal History     Past Medical History:   Diagnosis Date   • Injury of back     lifted something at work, playing golf   • Lumbar herniated disc    • Numbness and tingling of right lower extremity    • Right leg pain      Past Surgical History:   Procedure Laterality Date   • BICEPS TENDON REPAIR Right    • LUMBAR DISCECTOMY Right 10/4/2019    Procedure: Right lumbar 4 to lumbar 5 laminectomy and discectomy with metrx;  Surgeon: Ghanshyam Canas MD;  Location: [facility];  Service: Neurosurgery   • NO PAST SURGERIES       Family History   Problem Relation Age of Onset   • Malig Hyperthermia Neg Hx      Social History     Tobacco Use   • Smoking status: Never Smoker   • Smokeless tobacco: Never Used   Substance Use Topics   • Alcohol use: Yes     Comment: social   • Drug use: No     No current facility-administered medications on file prior to encounter.     Current Outpatient Medications on File Prior to Encounter   Medication Sig Dispense Refill   • acetaminophen (TYLENOL) 500 MG tablet Take 1,000 mg by mouth Every 6 (Six) Hours As Needed for Mild Pain .     • albuterol sulfate  (90 Base) MCG/ACT inhaler Inhale 2 puffs Every 4 (Four) Hours As Needed for Wheezing for up to 30 days. 18 g 0   • dexamethasone (DECADRON) 1.5 MG tablet 3 am 3 pm for 4 day's, 3 am 2 pm for 1 day, 2 am 2 pm for 3 day's, 2 am 1 pm for 1 day, 1 am 1 pm for 3 day's, 1 po am x 1 day only 51 tablet 0   •  gabapentin (NEURONTIN) 300 MG capsule Take 2 capsules by mouth Every Night. 60 capsule 2   • ibuprofen (ADVIL,MOTRIN) 200 MG tablet Take 200 mg by mouth Every 6 (Six) Hours As Needed for Mild Pain .     • ivermectin (STROMECTOL) 3 MG tablet tablet Take 3 mg by mouth 1 (One) Time.     • loratadine (CLARITIN) 10 MG tablet Take 10 mg by mouth Daily.     • doxycycline (MONODOX) 100 MG capsule Take 1 capsule by mouth 2 (Two) Times a Day for 10 days. 20 capsule 0   • HYDROcodone-acetaminophen (NORCO) 5-325 MG per tablet Take 1 tablet by mouth Every 4 (Four) Hours As Needed for Moderate Pain  (pain). (Patient taking differently: Take 1 tablet by mouth As Needed for Moderate Pain  (pain).) 35 tablet 0     No Known Allergies    Objective    Objective     Vital Signs  Temp:  [100 °F (37.8 °C)] 100 °F (37.8 °C)  Heart Rate:  [89-99] 89  Resp:  [22] 22  BP: (139-141)/(74-79) 139/79  SpO2:  [70 %-94 %] 94 %  on  Flow (L/min):  [15] 15;   Device (Oxygen Therapy): nonrebreather mask  Body mass index is 36.24 kg/m².    Physical Exam  Constitutional:       General: He is in acute distress.      Appearance: Normal appearance. He is obese. He is ill-appearing.   HENT:      Head: Normocephalic and atraumatic.      Mouth/Throat:      Mouth: Mucous membranes are dry.   Eyes:      General: No scleral icterus.     Extraocular Movements: Extraocular movements intact.      Conjunctiva/sclera: Conjunctivae normal.   Cardiovascular:      Rate and Rhythm: Normal rate and regular rhythm.      Pulses: Normal pulses.      Heart sounds: Normal heart sounds. No murmur heard.      Pulmonary:      Effort: Tachypnea, accessory muscle usage and respiratory distress present.      Breath sounds: Decreased air movement present. Examination of the right-upper field reveals rhonchi. Examination of the left-upper field reveals wheezing. Examination of the right-middle field reveals rhonchi. Examination of the right-lower field reveals decreased breath sounds.  Examination of the left-lower field reveals decreased breath sounds. Decreased breath sounds, wheezing and rhonchi present.   Chest:      Chest wall: No tenderness.   Abdominal:      General: Bowel sounds are decreased.      Palpations: Abdomen is soft.      Tenderness: There is no abdominal tenderness.   Musculoskeletal:         General: Signs of injury present.      Right upper arm: Tenderness present.      Left upper arm: Normal.      Cervical back: Normal range of motion and neck supple. No rigidity or tenderness.   Neurological:      General: No focal deficit present.      Mental Status: He is alert and oriented to person, place, and time. Mental status is at baseline.      Cranial Nerves: No cranial nerve deficit.      Motor: Weakness present.   Psychiatric:         Mood and Affect: Mood normal.         Behavior: Behavior normal.         Thought Content: Thought content normal.         Judgment: Judgment normal.         Results Review:  I reviewed the patient's new clinical results.  I reviewed the patient's new imaging results and agree with the interpretation.  I reviewed the patient's other test results and agree with the interpretation  I personally viewed and interpreted the patient's EKG/Telemetry data  Discussed with ED provider.    Lab Results (last 24 hours)     Procedure Component Value Units Date/Time    CBC & Differential [[id number]]  (Abnormal) Collected: 01/02/22 2019    Specimen: Blood Updated: 01/02/22 2049    Narrative:      The following orders were created for panel order CBC & Differential.  Procedure                               Abnormality         Status                     ---------                               -----------         ------                     CBC Auto Differential[[id number]]        Abnormal            Final result                 Please view results for these tests on the individual orders.    Comprehensive Metabolic Panel [[id number]]  (Abnormal) Collected: 01/02/22 2019     Specimen: Blood Updated: 01/02/22 2152     Glucose 128 mg/dL      BUN 15 mg/dL      Creatinine 1.17 mg/dL      Sodium 136 mmol/L      Potassium 3.9 mmol/L      Comment: Specimen hemolyzed.  Results may be affected.        Chloride 96 mmol/L      CO2 19.8 mmol/L      Calcium 8.6 mg/dL      Total Protein 6.2 g/dL      Albumin 3.50 g/dL      ALT (SGPT) 46 U/L      Comment: Specimen hemolyzed.  Results may be affected.        AST (SGOT) 69 U/L      Comment: Specimen hemolyzed.  Results may be affected.        Alkaline Phosphatase 74 U/L      Total Bilirubin 0.8 mg/dL      eGFR Non African Amer 64 mL/min/1.73      Globulin 2.7 gm/dL      A/G Ratio 1.3 g/dL      BUN/Creatinine Ratio 12.8     Anion Gap 20.2 mmol/L     Narrative:      GFR Normal >60  Chronic Kidney Disease <60  Kidney Failure <15      BNP [[id number]]  (Normal) Collected: 01/02/22 2019    Specimen: Blood Updated: 01/02/22 2058     proBNP 355.9 pg/mL     Narrative:      Among patients with dyspnea, NT-proBNP is highly sensitive for the detection of acute congestive heart failure. In addition NT-proBNP of <300 pg/ml effectively rules out acute congestive heart failure with 99% negative predictive value.    Results may be falsely decreased if patient taking Biotin.      Troponin [[id number]]  (Normal) Collected: 01/02/22 2019    Specimen: Blood Updated: 01/02/22 2143     Troponin T <0.010 ng/mL     Narrative:      Troponin T Reference Range:  <= 0.03 ng/mL-   Negative for AMI  >0.03 ng/mL-     Abnormal for myocardial necrosis.  Clinicians would have to utilize clinical acumen, EKG, Troponin and serial changes to determine if it is an Acute Myocardial Infarction or myocardial injury due to an underlying chronic condition.       Results may be falsely decreased if patient taking Biotin.      Sitesimon Blood Culture Bottle Set [[id number]] Collected: 01/02/22 2019    Specimen: Blood from Arm, Left Updated: 01/02/22 2130     Extra Tube Hold for add-ons.     Comment:  "Auto resulted.       CBC Auto Differential [[id number]]  (Abnormal) Collected: 01/02/22 2019    Specimen: Blood Updated: 01/02/22 2049     WBC 10.20 10*3/mm3      RBC 5.03 10*6/mm3      Hemoglobin 14.2 g/dL      Hematocrit 40.9 %      MCV 81.3 fL      MCH 28.2 pg      MCHC 34.7 g/dL      RDW 13.5 %      RDW-SD 39.0 fl      MPV 10.4 fL      Platelets 329 10*3/mm3      Neutrophil % 90.0 %      Lymphocyte % 6.1 %      Monocyte % 1.9 %      Eosinophil % 0.0 %      Basophil % 0.2 %      Immature Grans % 1.8 %      Neutrophils, Absolute 9.19 10*3/mm3      Lymphocytes, Absolute 0.62 10*3/mm3      Monocytes, Absolute 0.19 10*3/mm3      Eosinophils, Absolute 0.00 10*3/mm3      Basophils, Absolute 0.02 10*3/mm3      Immature Grans, Absolute 0.18 10*3/mm3      nRBC 0.0 /100 WBC     Procalcitonin [[id number]]  (Abnormal) Collected: 01/02/22 2019    Specimen: Blood Updated: 01/02/22 2108     Procalcitonin 2.38 ng/mL     Narrative:      As a Marker for Sepsis (Non-Neonates):     1. <0.5 ng/mL represents a low risk of severe sepsis and/or septic shock.  2. >2 ng/mL represents a high risk of severe sepsis and/or septic shock.    As a Marker for Lower Respiratory Tract Infections that require antibiotic therapy:  PCT on Admission     Antibiotic Therapy             6-12 Hrs later  >0.5                          Strongly Recommended            >0.25 - <0.5             Recommended  0.1 - 0.25                  Discouraged                       Remeasure/reassess PCT  <0.1                         Strongly Discouraged         Remeasure/reassess PCT      As 28 day mortality risk marker: \"Change in Procalcitonin Result\" (>80% or <=80%) if Day 0 (or Day 1) and Day 4 values are available. Refer to http://www.[facility]-pct-calculator.com/    Change in PCT <=80 %   A decrease of PCT levels below or equal to 80% defines a positive change in PCT test result representing a higher risk for 28-day all-cause mortality of patients diagnosed with severe " sepsis or septic shock.    Change in PCT >80 %   A decrease of PCT levels of more than 80% defines a negative change in PCT result representing a lower risk for 28-day all-cause mortality of patients diagnosed with severe sepsis or septic shock.                C-reactive Protein [[id number]]  (Abnormal) Collected: 01/02/22 2019    Specimen: Blood Updated: 01/02/22 2118     C-Reactive Protein 25.13 mg/dL     D-dimer, Quantitative [[id number]]  (Abnormal) Collected: 01/02/22 2019    Specimen: Blood Updated: 01/02/22 2220     D-Dimer, Quantitative 2.50 MCGFEU/mL     Narrative:      The Stago D-Dimer test used in conjunction with a clinical pretest probability (PTP) assessment model, has been approved by the FDA to rule out the presence of venous thromboembolism (VTE) in outpatients suspected of deep venous thrombosis (DVT) or pulmonary embolism (PE). The cut-off for negative predictive value is <0.50 MCGFEU/mL.    CK [[id number]]  (Abnormal) Collected: 01/02/22 2019    Specimen: Blood Updated: 01/02/22 2306     Creatine Kinase 1,229 U/L     Ferritin [[id number]] Collected: 01/02/22 2019    Specimen: Blood Updated: 01/02/22 2248    C-reactive Protein [[id number]]  (Abnormal) Collected: 01/02/22 2019    Specimen: Blood Updated: 01/02/22 2306     C-Reactive Protein 26.49 mg/dL     Lactate Dehydrogenase [[id number]]  (Abnormal) Collected: 01/02/22 2019    Specimen: Blood Updated: 01/02/22 2309      U/L      Comment: Specimen hemolyzed.  Results may be affected.       Fibrinogen [[id number]]  (Abnormal) Collected: 01/02/22 2019    Specimen: Blood Updated: 01/02/22 2306     Fibrinogen 960 mg/dL     Hepatic Function Panel [[id number]]  (Abnormal) Collected: 01/02/22 2019    Specimen: Blood Updated: 01/02/22 2309     Total Protein 6.4 g/dL      Albumin 3.50 g/dL      ALT (SGPT) 46 U/L      Comment: Specimen hemolyzed.  Results may be affected.        AST (SGOT) 72 U/L      Comment: Specimen hemolyzed.  Results may be affected.         Alkaline Phosphatase 76 U/L      Total Bilirubin 0.8 mg/dL      Bilirubin, Direct 0.2 mg/dL      Comment: Specimen hemolyzed. Results may be affected.        Bilirubin, Indirect 0.6 mg/dL     COVID-19,[facility] IN-HOUSE CEPHEID/EDWINA NP SWAB IN TRANSPORT MEDIA 8-12 HR TAT - Swab, Nasopharynx [[id number]]  (Abnormal) Collected: 01/02/22 2027    Specimen: Swab from Nasopharynx Updated: 01/02/22 2142     COVID19 Detected    Narrative:      Fact sheet for providers: https://www.fda.gov/media/125994/download     Fact sheet for patients: https://www.fda.gov/media/140238/download    Blood Culture - Blood, Arm, Left [[id number]] Collected: 01/02/22 2027    Specimen: Blood from Arm, Left Updated: 01/02/22 2039          Imaging Results (Last 24 Hours)     Procedure Component Value Units Date/Time    XR Chest 1 View [[id number]] Collected: 01/02/22 2102     Updated: 01/02/22 2106    Narrative:      CHEST SINGLE VIEW     HISTORY: COVID-19. Shortness of air.     COMPARISON: Two-view chest 12/31/2021.     FINDINGS: There are patchy bilateral areas of increased interstitial and  hazy airspace disease consistent with COVID-19 infiltrates. Infiltrates  appear somewhat more dense and confluent as compared to the previous  exam though without evidence for progression in distribution. No  perihilar edema or pneumothorax or pneumomediastinum is evident. Cardiac  monitoring leads are noted. The heart size is borderline enlarged.       Impression:      Patchy bilateral pulmonary infiltrates with mild increase in  density and consolidation when compared to the previous exam. No other  change.     This report was finalized on 1/2/2022 9:03 PM by Dr. Chidi Ruano M.D.               ECG 12 Lead   Preliminary Result   HEART RATE= 99  bpm   RR Interval= 606  ms   [place] Interval= 138  ms   P Horizontal Axis= -28  deg   P Front Axis= 51  deg   QRSD Interval= 83  ms   QT Interval= 349  ms   QRS Axis= 1  deg   T Wave Axis= 22  deg   - ABNORMAL  ECG -   Sinus rhythm   Left atrial enlargement   Abnormal R-wave progression, late transition   Borderline ST depression, lateral leads   Baseline wander in lead(s) V1   Electronically Signed By:    Date and Time of Study: 2022-01-02 21:02:06      ECG 12 Lead    (Results Pending)     Assessment/Plan   Assessment/Plan   Active Hospital Problems    Diagnosis  POA   • **Pneumonia due to COVID-19 virus [U07.1, J12.82]  Yes   • Acute respiratory failure with hypoxia and hypercapnia (HCC) [J96.01, J96.02]  Unknown   • Elevated liver enzymes [R74.8]  Yes      Resolved Hospital Problems   No resolved problems to display.       56 y.o. male admitted with Pneumonia due to COVID-19 virus.    Telemetry bed for continuous cardiac monitoring  Continuous pulse oximetry monitoring  Pulmonary consult. Patient currently has an increased oxygen demand and is on 15 L nonrebreather  Supplemental oxygen as needed. Please follow pulmonary recommendations  Decadron p.o. starting tomorrow  Initiate remdesivir infusion. Patient counseled on this and is agreeable  Symptom management. Patient is currently afebrile but on the high end of normal and complains of no pain. Heart rate and blood pressure are within desired limits at this time  Trend Covid labs  BMP in a.m. We will continue to monitor elevated liver enzymes  Pharmacy to dose Lovenox for DVT prophylaxis.  Full code.  Discussed with patient and Dr. Siegel.      LUZ Espinoza  [facility]  01/02/22  23:15 EST

## 2022-01-03 NOTE — CONSULTS
"[facility]    [facility]    HPI:  Mr. Rodriguez is  57yo unvaccinated WM.  He started feeling unwell about Saxis day.  He had \"flu like symptoms\" up until this past weekend he starting having shortness of breath, progressively increasing, became severe worse on exertion, some better at rest and he reports better now with oxygen.  He has been admitted here and is requiring a non rebreather at this point but sats are % on this and he is able to talk in complete sentences, he is not using accessory muscles and says he does not feel distressed.  He does not have chest pain or leg pain.  He does have some cough, mainly non productive.  He does report a history of JOSHUA but does not use pap therapy.  He has no history of asthma, he is a lifelong nonsmoker.  He did not get any antibody infusion prior to admission.  CXR shows bilateral infiltrates and his CRP is very high.      Past Medical History:   Diagnosis Date   • Injury of back     lifted something at work, playing golf   • Lumbar herniated disc    • Numbness and tingling of right lower extremity    • Right leg pain      Social History     Socioeconomic History   • Marital status:    Tobacco Use   • Smoking status: Never Smoker   • Smokeless tobacco: Never Used   Substance and Sexual Activity   • Alcohol use: Yes     Comment: social   • Drug use: No   • Sexual activity: Defer     Family History   Problem Relation Age of Onset   • Malig Hyperthermia Neg Hx      MEDS: reviewed as per chart  ALL: nkda  ROS: 12 point negative except for some pain in the right arm after a recent surgery to repair a ruptured tendon    Vital Sign Min/Max for last 24 hours  Temp  Min: 100 °F (37.8 °C)  Max: 100 °F (37.8 °C)   BP  Min: 103/85  Max: 141/74   Pulse  Min: 59  Max: 99   Resp  Min: 22  Max: 22   SpO2  Min: 70 %  Max: 97 %   Flow (L/min)  Min: 15  Max: 15   Weight  Min: 111 kg (244 lb 11.4 oz)  Max: 111 kg (244 lb 11.4 oz)     GEN:   appears " ill, obese, AxOx3  HEENT: PERRL, EOMI, normal sclera,  mmm, no jvd, trachea midline, neck supple  CHEST: fair ae bilat, no wheezes, + faint crackles, no use of accessory muscles  CV: RRR, no m/g/r  ABD: soft, nt, nd +bs, no hepatosplenomegaly  EXT: no c/c/e  SKIN: no rashes, no xanthomas, nl turgor, warm, dry  LYMPH: no palpable cervical or supraclavicular lymphadenopathy  NEURO: CN 2-12 intact and symmetric bilaterally  PSYCH: nl affect, nl orientation, nl judgement, nl mood  MSK: no kyphoscoliosis, 5/5 strength ue and le bilaterally    Labs: 1/3/2021: reviewed:  Wbc 8  hgb 12.2  plts 292  Glucose 133  Bun 17  Cr 1.18  Na 135  Bicarb 22  Lactate 1.6  Trop 0.01  D dimer 2.26  1/2: procal 2.38  Ck 1229  1/2: cxr: bilteral infiltrates    A/P:  1. AhRF -- high oxygen requirements but sats are good right now and he doesn't appear to be in acute distress. Continue on non rebreather for now.    2. COVID19 pneumonia -- severe case -- he did get a dose of antibiotic and procal was elevated, will ask ID to see regarding IL-6 inhibitor given the elevated procal  3. Elevated d-dimer -- also just had surgery -- will get stat ct angio  4. JOSHUA -- doesn't use pap at home  5. Obesity

## 2022-01-03 NOTE — CONSULTS
Referring Provider: No ref. provider found  Reason for Consultation: COVID-19 pneumonia  Chief Complaint   Patient presents with   • Shortness of Breath       Subjective   History of present illness: Patient is a 56-year-old male who presents after developing worsening shortness of breath and cough.  Diagnosed with COVID-19 pneumonia and ID was consulted for evaluation of IL-6 inhibitor therapy.    Patient reports he developed fatigue around the time of Violet day and has slowly progressed to having a cough and shortness of breath since that time.  He reports that over the last 24 hours or so it has been severe with difficulty catching his breath.  He reports he was also developing fevers over the last couple days.  Denies any nausea, vomiting, or abdominal pain.  States he has had a couple episodes of loose bowel movements he considers diarrhea.  States his wife was also sick at home however much less severe.  States he has had a decreased appetite since having a recent right arm surgery.  He had a biceps tendon repair on 12/21.    He denies any history of smoking.  States he has had sleep apnea but does not use a CPAP machine.  Reports he has not been vaccinated against COVID-19.    Reports he was originally tested on 12/31 for COVID-19 which was positive and follow-up testing here on admission was also positive.  He has imaging consistent with bilateral infiltrates and markedly elevated CRP.  Procalcitonin also elevated to 2.38.    Past Medical History:   Diagnosis Date   • Injury of back     lifted something at work, playing golf   • Lumbar herniated disc    • Numbness and tingling of right lower extremity    • Right leg pain        Past Surgical History:   Procedure Laterality Date   • BICEPS TENDON REPAIR Right    • LUMBAR DISCECTOMY Right 10/4/2019    Procedure: Right lumbar 4 to lumbar 5 laminectomy and discectomy with metrx;  Surgeon: Ghanshyam Canas MD;  Location: [facility] OR;  Service:  "Neurosurgery   • NO PAST SURGERIES         family history is not on file.     reports that he has never smoked. He has never used smokeless tobacco. He reports current alcohol use. He reports that he does not use drugs.     No Known Allergies    Medication:  Antibiotics:  Anti-Infectives (From admission, onward)    Ordered     Dose/Rate Route Frequency Start Stop    01/02/22 2236  remdesivir 100 mg in sodium chloride 0.9 % 270 mL IVPB (powder vial)        Ordering Provider: Renaldo Cooper APRN   \"Followed by\" Linked Group Details    100 mg  over 60 Minutes Intravenous Every 24 Hours 01/03/22 2237 01/07/22 2237    01/03/22 0648  cefTRIAXone (ROCEPHIN) 1 g in sodium chloride 0.9 % 100 mL IVPB-VTB        Ordering Provider: John Maher MD    1 g  200 mL/hr over 30 Minutes Intravenous Every 24 Hours 01/03/22 2100 01/08/22 2059 01/02/22 2236  remdesivir 200 mg in sodium chloride 0.9 % 290 mL IVPB (powder vial)        Ordering Provider: Renaldo Cooper APRN   \"Followed by\" Linked Group Details    200 mg  over 60 Minutes Intravenous Every 24 Hours 01/02/22 2238 01/03/22 0359    01/02/22 2150  cefTRIAXone (ROCEPHIN) 1 g in sodium chloride 0.9 % 100 mL IVPB-VTB        Ordering Provider: Krishna Hebert MD    1 g  200 mL/hr over 30 Minutes Intravenous Once 01/02/22 2152 01/02/22 2323 01/02/22 2110  ivermectin (STROMECTOL) 3 MG tablet tablet        Ordering Provider: Porfirio Pugh MD    3 mg Oral Once --            Review of Systems  Pertinent items are noted in HPI, all other systems reviewed and negative    Objective     Physical Exam:   Vital Signs   Temp:  [100 °F (37.8 °C)] 100 °F (37.8 °C)  Heart Rate:  [59-99] 65  Resp:  [22] 22  BP: (103-141)/(74-85) 125/78    GENERAL: Awake and alert, in no acute distress.   HEENT: Oropharynx is clear. Hearing is grossly normal.   EYES: PERRL. No conjunctival injection. No lid lag.   LYMPHATICS: No lymphadenopathy of the neck or inguinal " regions.   HEART: Regular rate and regular rhythm. No peripheral edema.   LUNGS: Increased respiratory effort.  Currently on nonrebreather.  Bilateral lower rhonchi.  GI: Soft, nontender, nondistended. No appreciable organomegaly.   SKIN: Warm and dry without cutaneous eruptions   PSYCHIATRIC: Appropriate mood, affect, insight, and judgment.     Results Review:   I reviewed the patient's new clinical results.  I reviewed the patient's new imaging results and agree with the interpretation.  I reviewed the patient's other test results and agree with the interpretation    Lab Results   Component Value Date    WBC 8.23 01/03/2022    HGB 12.2 (L) 01/03/2022    HCT 36.4 (L) 01/03/2022    MCV 83.1 01/03/2022     01/03/2022       No results found for: MEEK COSTA VANCORANDOM    Lab Results   Component Value Date    GLUCOSE 133 (H) 01/03/2022    BUN 17 01/03/2022    CREATININE 1.18 01/03/2022    EGFRIFNONA 64 01/03/2022    BCR 14.4 01/03/2022    CO2 22.2 01/03/2022    CALCIUM 8.4 (L) 01/03/2022    ALBUMIN 2.80 (L) 01/03/2022    AST 54 (H) 01/03/2022    ALT 38 01/03/2022         Estimated Creatinine Clearance: 84.7 mL/min (by C-G formula based on SCr of 1.18 mg/dL).      Microbiology:  12/31 Covid positive  1/2 blood culture in process  1/2 Covid positive    Radiology:  12/31 chest x-ray reviewed by me with bilateral patchy infiltrates consistent with pneumonia.    1/2 chest x-ray reviewed by me with bilateral patchy infiltrates    1/3 CT angiogram of the chest without pulmonary emboli and findings of diffuse bilateral patchy groundglass opacities    Assessment/Plan   #Acute hypoxic respiratory failure in the setting of bilateral Covid pneumonia  #Bacterial pneumonia  #History of JOSHUA complicating the above    Given patient's elevated procalcitonin to 2.2 it would be difficult to recommend an IL-6 inhibitor at this time.  He has findings of bilateral pneumonia which are consistent with Covid pneumonia  however it would be impossible to rule out a concomitant bacterial component given the elevation procalcitonin.    Agree with continuing IV remdesivir for 5 days and dexamethasone daily.  Also agree with ceftriaxone 1 g daily to cover for bacterial component of pneumonia.  Would plan for minimum 5 days of therapy.  We will plan to recheck procalcitonin tomorrow and follow daily LFTs, creatinine.    Thank you for this consult.  We will continue to follow along and tailor antibiotics as the patient's clinical course evolves.

## 2022-01-04 PROBLEM — E78.41 ELEVATED LIPOPROTEIN(A): Status: ACTIVE | Noted: 2022-01-01

## 2022-01-04 NOTE — PROGRESS NOTES
" LOS: 2 days     Chief Complaint: Covid pneumonia    Interval History: Patient reports he is doing well this morning.  On 60 L at 100% of oxygen which is stable from yesterday.  Denies any nausea, vomiting, diarrhea, or constipation.  States his breathing is slightly better today.  Denies any fevers or chills.  Reports one coughing fit overnight that cause desaturations with attempts to wean oxygen.  Reports he is tolerating antibiotics well.    Vital Signs  Temp:  [98.7 °F (37.1 °C)-99.2 °F (37.3 °C)] 98.7 °F (37.1 °C)  Heart Rate:  [58-69] 58  Resp:  [18-24] 24  BP: (113-137)/() 120/80    Physical Exam:  General: In no acute distress  HEENT: Oropharynx clear, moist mucous membranes  Cardiovascular: RRR, normal S1 and S2, no M/R/G  Respiratory: Clear to ascultation bilaterally, no wheezing  GI: Soft, NT/ND, + bowel sounds bilaterally, no masses  Skin: No rashes or lesions  Extremities: No edema, cyanosis.  Right upper extremity with casting in place.  Access: Peripheral IV    Antibiotics:  Anti-Infectives (From admission, onward)    Ordered     Dose/Rate Route Frequency Start Stop    01/02/22 2236  remdesivir 100 mg in sodium chloride 0.9 % 270 mL IVPB (powder vial)        Ordering Provider: Renaldo Cooper APRN   \"Followed by\" Linked Group Details    100 mg  over 60 Minutes Intravenous Every 24 Hours 01/03/22 2237 01/07/22 2237    01/03/22 0648  cefTRIAXone (ROCEPHIN) 1 g in sodium chloride 0.9 % 100 mL IVPB-VTB        Ordering Provider: John Maher MD    1 g  200 mL/hr over 30 Minutes Intravenous Every 24 Hours 01/03/22 2100 01/08/22 2059 01/02/22 2236  remdesivir 200 mg in sodium chloride 0.9 % 290 mL IVPB (powder vial)        Ordering Provider: Renaldo Cooper APRN   \"Followed by\" Linked Group Details    200 mg  over 60 Minutes Intravenous Every 24 Hours 01/02/22 2238 01/03/22 0359    01/02/22 2150  cefTRIAXone (ROCEPHIN) 1 g in sodium chloride 0.9 % 100 mL IVPB-VTB        Ordering " Provider: Krishna Hebert MD    1 g  200 mL/hr over 30 Minutes Intravenous Once 01/02/22 2152 01/02/22 2329           Results Review:     I reviewed the patient's new clinical results.    Lab Results   Component Value Date    WBC 10.41 01/04/2022    HGB 12.2 (L) 01/04/2022    HCT 37.5 01/04/2022    MCV 85.2 01/04/2022     01/04/2022     Lab Results   Component Value Date    GLUCOSE 133 (H) 01/03/2022    BUN 17 01/03/2022    CREATININE 1.18 01/03/2022    EGFRIFNONA 64 01/03/2022    BCR 14.4 01/03/2022    CO2 22.2 01/03/2022    CALCIUM 8.4 (L) 01/03/2022    ALBUMIN 2.80 (L) 01/03/2022    AST 54 (H) 01/03/2022    ALT 38 01/03/2022       Microbiology:  12/31 Covid positive  1/2 blood culture in process  1/2 Covid positive     Radiology:  12/31 chest x-ray with bilateral patchy infiltrates consistent with pneumonia.     1/2 chest x-ray with bilateral patchy infiltrates     1/3 CT angiogram of the chest without pulmonary emboli and findings of diffuse bilateral patchy groundglass opacities    Assessment/Plan   #Acute hypoxic respiratory failure in the setting of bilateral Covid pneumonia  #Bacterial pneumonia  #History of JOSHUA complicating the above     Continuing IV remdesivir for 5 days and dexamethasone daily.  Also agree with ceftriaxone 1 g daily to cover for bacterial component of pneumonia and due to this would not recommend IL-6 inhibitor therapy at this time.  Would plan for minimum 5 days of therapy with antibiotics.  Procalcitonin in process today and follow daily LFTs, CRP creatinine daily.  Respiratory culture ordered if he is able to produce sputum.    ID will follow.

## 2022-01-04 NOTE — PLAN OF CARE
Goal Outcome Evaluation:  Plan of Care Reviewed With: patient        Progress: no change  Outcome Summary: Has slept intervals. Soa with any exertion> remains maxed on Airvo 60/100. Nrb mask prn for inc soa or any activity. Denies pain. VSS. Dsg intact to R arm where pt had surgery for torn bicep tendon Dec 21,2021. Denies numbness or tingling pulses +. No other changes. No distress

## 2022-01-04 NOTE — PROGRESS NOTES
[facility] Medicine Services  PROGRESS NOTE    Patient Name: Renaldo Rodriguez  : 1965  MRN: [id number]    Date of Admission: 2022  Primary Care Physician: Provider, No Known    Subjective   Subjective     CC:  Follow-up Covid pneumonia    HPI:  Patient still weak, short of breath, frustrated with his illness but glad his oxygen needs are not much worse than yesterday afternoon.  Tolerating treatments thus far.  Denies new complaints.  Appetite starting to improve.    Review of Systems  No current headache or lightheadedness  No current nausea, vomiting, or diarrhea  No current chest pain or palpitations      Objective   Objective     Vital Signs:   Temp:  [98.7 °F (37.1 °C)-99.2 °F (37.3 °C)] 98.7 °F (37.1 °C)  Heart Rate:  [58-69] 58  Resp:  [18-24] 24  BP: (113-137)/() 120/80        Physical Exam:  Constitutional:Awake, alert, ill-appearing  HENT: NCAT, mucous membranes moist, neck supple  Respiratory: Persistent coarse respiratory sounds, cough is noted, breathing is labored, tachypneic, currently on high oxygen needs  Cardiovascular: RRR, normal radial pulses  Gastrointestinal: Positive bowel sounds, soft, nontender, nondistended  Musculoskeletal: Significantly obese, BMI is 36, minimal lower extremity edema  Psychiatric: Calm affect, cooperative, conversational  Neurologic: No slurred speech or facial droop, follows commands  Skin: No rashes or jaundice, warm      Results Reviewed:  Results from last 7 days   Lab Units 22  0908 22   WBC 10*3/mm3 10.41 8.23 10.20   HEMOGLOBIN g/dL 12.2* 12.2* 14.2   HEMATOCRIT % 37.5 36.4* 40.9   PLATELETS 10*3/mm3 407 292 329   PROCALCITONIN ng/mL 5.86*  --  2.38*     Results from last 7 days   Lab Units 22  0908 22  1340 22  0207 22   SODIUM mmol/L 134*  --  135*  --  136   POTASSIUM mmol/L 3.7  --  3.8  --  3.9   CHLORIDE mmol/L 102  --  100  --  96*   CO2 mmol/L 21.8*  --   22.2  --  19.8*   BUN mg/dL 25*  --  17  --  15   CREATININE mg/dL 0.80  --  1.18  --  1.17   GLUCOSE mg/dL 147*  --  133*  --  128*   CALCIUM mg/dL 8.5*  --  8.4*  --  8.6   ALT (SGPT) U/L 35  --  38  --  46*  46*   AST (SGOT) U/L 47*  --  54*  --  72*  69*   TROPONIN T ng/mL <0.010 <0.010 <0.010   < > <0.010   PROBNP pg/mL  --   --   --   --  355.9    < > = values in this interval not displayed.     Estimated Creatinine Clearance: 122.6 mL/min (by C-G formula based on SCr of 0.8 mg/dL).    Microbiology Results Abnormal     Procedure Component Value - Date/Time    Blood Culture - Blood, Arm, Left [[id number]]  (Normal) Collected: 01/02/22 2027    Lab Status: Preliminary result Specimen: Blood from Arm, Left Updated: 01/03/22 2045     Blood Culture No growth at 24 hours          Imaging Results (Last 24 Hours)     Procedure Component Value Units Date/Time    CT Angiogram Chest With & Without Contrast [[id number]] Collected: 01/03/22 0746     Updated: 01/03/22 1302    Narrative:      EXAMINATION: CT ANGIOGRAM OF THE CHEST WITH CONTRAST     HISTORY: 56-year-old male with a history of shortness of air and COVID  pneumonia, rule out pulmonary embolism.     TECHNIQUE: Contiguous axial images were obtained through the chest  following bolus intravenous administration of nonionic contrast. Three-D  reformatted images were produced and presented for interpretation.     COMPARISON: Chest x-ray, 01/02/2022.     FINDINGS: No filling defects are seen within the pulmonary arterial  system to the level of the subsegmental pulmonary arteries. The RV to LV  ratio is less than 1. There are groundglass opacities mixed with areas  of patchy consolidation that are seen throughout the bilateral upper  lobes, right middle lobe, lingula and bilateral lower lobes. There are  some areas of normal aeration seen in the right upper lobe, right middle  lobe and right lower lobe and to a lesser degree the left upper lobe and  left lower lobe. No  evidence for pleural effusion is appreciated. No  evidence for mediastinal or hilar adenopathy is appreciated. The  visualized soft tissue structures of the upper abdomen appear normal.  Mild colonic diverticulosis is noted.       Impression:      1. There is no evidence for pulmonary embolism.  2. There are multilobar diffuse bilateral patchy groundglass and  consolidative infiltrates seen involving a portion of all lobes of both  lungs. This is consistent with diffuse multilobar bilateral  pneumonia/COVID pneumonia.     Radiation dose reduction techniques were utilized, including automated  exposure control and exposure modulation based on body size.     This report was finalized on 1/3/2022 12:59 PM by Dr. Torey Lebron M.D.                 I have reviewed the medications:  Scheduled Meds:cefTRIAXone, 1 g, Intravenous, Q24H  cholecalciferol, 1,000 Units, Oral, Daily  dexamethasone, 6 mg, Oral, Q12H  enoxaparin, 40 mg, Subcutaneous, Q24H  famotidine, 20 mg, Oral, Daily  remdesivir, 100 mg, Intravenous, Q24H      Continuous Infusions:   PRN Meds:.•  acetaminophen  •  melatonin  •  nitroglycerin  •  ondansetron **OR** ondansetron  •  sodium chloride    Assessment/Plan   Assessment & Plan     Active Hospital Problems    Diagnosis  POA   • **Pneumonia due to COVID-19 virus [U07.1, J12.82]  Yes   • Hyperlipidemia, elevated LDL [E78.41]  Yes   • Lymphocytopenia [D72.810]  Yes   • Acute transaminitis due to COVID-19 viral infection [B17.9]  Yes   • Class 2 severe obesity with serious comorbidity in adult (HCC) [E66.01]  Yes   • JOSHUA (obstructive sleep apnea) [G47.33]  Yes   • Bacterial pneumonia [J15.9]  Yes   • Abnormal EKG [R94.31]  Yes   • Acute respiratory failure with hypoxia and hypercapnia (HCC) [J96.01, J96.02]  Unknown   • Elevated liver enzymes [R74.8]  Yes   • Neuritis or radiculitis due to rupture of lumbar intervertebral disc [M51.16]  Yes      Resolved Hospital Problems    Diagnosis Date Resolved POA   •  Metabolic acidosis [E87.2] 01/03/2022 Yes        Brief Hospital Course to date:  Renaldo Rodriguez is a 56 y.o. male presents to the hospital with COVID-19 pneumonia and secondary bacterial pneumonia.  Patient had been attempting to treat himself at home with equine agricultural ivermectin.    COVID-19 pneumonia with secondary respiratory failure, lymphocytopenia, transaminitis and bacterial pneumonia:  Aggressive treatment for COVID-19 with dexamethasone and remdesivir.  Encourage self proning, advance oxygen to [employer].  May need BiPAP if worsens.  Patient wishes to be DNI after extensive conversation.  Lymphocytopenia is common with COVID-19.  Monitor blood counts.  Transaminitis likely related to Covid associated viral hepatitis.  Trend.  Supportive care and symptom treatment.  Flutter valve and incentive spirometer.  Ceftriaxone 5-day treatment for bacterial pneumonia.  Trend inflammatory markers.  CT angiogram negative for pulmonary embolus.  GI prophylaxis with H2 blocker.    Obstructive sleep apnea: Further complicates issue.  Supplemental oxygen and pulse oximetry monitoring.  Patient has never been on CPAP.    Abnormal EKG, adverse reaction to ivermectin, hyperlipidemia:  EKG abnormal at admission but no ACS.  Likely ivermectin associated EKG changes.  Repeat EKG 1/4.  Recommend outpatient cardiology evaluation.  LDL notably at 106.  Start low-dose pravastatin, there is some evidence statin may be helpful with Covid infection.    Obesity:  Further complicates illness.  Supportive care and symptom treatment.  Eventual weight loss recommended.    History of lumbar degenerative disc disease and neuropathic pain:  No longer taking gabapentin.  Tylenol as needed for now.    Metabolic acidosis: Noted on admission.  Monitor, improved..    DVT Prophylaxis: Lovenox    Disposition: Pending course    CODE STATUS:   Code Status and Medical Interventions:   Ordered at: 01/03/22 0854     Medical Intervention Limits:     NO intubation (DNI)     Level Of Support Discussed With:    Patient     Code Status (Patient has no pulse and is not breathing):    CPR (Attempt to Resuscitate)     Medical Interventions (Patient has pulse or is breathing):    Limited Support       John Maher MD  01/04/22

## 2022-01-04 NOTE — PROGRESS NOTES
"      [facility]         Dr Cary Sayied   LOS: 2 days   Patient Care Team:  Provider, No Known as PCP - General    Chief Complaint: Acute hypoxemic respiratory failure with COVID-19 pneumonia elevated dimer with CT angiogram negative for PE JOSHUA intolerant to CPAP    Interval History: Events noted chart reviewed.  Oxygen requirement between 13 to 15 L high flow.  Feels better today.    REVIEW OF SYSTEMS:   CARDIOVASCULAR: No chest pain, chest pressure or chest discomfort. No palpitations or edema.   RESPIRATORY: Short of breath with activity  GASTROINTESTINAL: No anorexia, nausea, vomiting or diarrhea. No abdominal pain or blood.   HEMATOLOGIC: No bleeding or bruising.     Ventilator/Non-Invasive Ventilation Settings (From admission, onward)            None            Vital Signs  Temp:  [98.7 °F (37.1 °C)-99.4 °F (37.4 °C)] 99.4 °F (37.4 °C)  Heart Rate:  [58-70] 70  Resp:  [18-24] 18  BP: (113-137)/() 127/83    Intake/Output Summary (Last 24 hours) at 1/4/2022 1511  Last data filed at 1/4/2022 1131  Gross per 24 hour   Intake --   Output 725 ml   Net -725 ml     Flowsheet Rows      First Filed Value   Admission Height 175 cm (68.9\") Documented at 01/02/2022 2025   Admission Weight 111 kg (244 lb 11.4 oz) Documented at 01/02/2022 2025          Physical Exam:  Patient is examined using the personal protective equipment as per guidelines from infection control for this particular patient as enacted.  Hand hygiene was performed before and after patient interaction.   General Appearance:    Alert, cooperative, in no acute distress.  Following simple commands  Neck midline trachea, no thyromegaly   Lungs:    Diminished breath sounds with rhonchi bilaterally on the basis    Heart:    Regular rhythm and normal rate, normal S1 and S2, no            murmur, no gallop, no rub, no click   Chest Wall:    No abnormalities observed   Abdomen:     Normal bowel sounds, no masses, no organomegaly, soft        " nontender, nondistended, no guarding, no rebound                tenderness   Extremities:   Moves all extremities well, no edema, no cyanosis, no             redness  CNS no focal neurological deficits normal sensory exam  Skin no rashes no nodules  Musculoskeletal no cyanosis no clubbing normal range of motion     Results Review:        Results from last 7 days   Lab Units 01/04/22  0908 01/03/22  0207 01/03/22  0207 01/02/22 2019 01/02/22 2019   SODIUM mmol/L 134*  --  135*  --  136   POTASSIUM mmol/L 3.7  --  3.8  --  3.9   CHLORIDE mmol/L 102  --  100  --  96*   CO2 mmol/L 21.8*  --  22.2  --  19.8*   BUN mg/dL 25*  --  17  --  15   CREATININE mg/dL 0.80  --  1.18  --  1.17   GLUCOSE mg/dL 147*   < > 133*   < > 128*   CALCIUM mg/dL 8.5*  --  8.4*  --  8.6    < > = values in this interval not displayed.     Results from last 7 days   Lab Units 01/04/22  0908 01/03/22  1340 01/03/22  0207 01/02/22 2019 01/02/22 2019   CK TOTAL U/L 844*  --   --   --  1,229*   TROPONIN T ng/mL <0.010 <0.010 <0.010   < > <0.010    < > = values in this interval not displayed.     Results from last 7 days   Lab Units 01/04/22  0908 01/03/22 0207 01/02/22 2019   WBC 10*3/mm3 10.41 8.23 10.20   HEMOGLOBIN g/dL 12.2* 12.2* 14.2   HEMATOCRIT % 37.5 36.4* 40.9   PLATELETS 10*3/mm3 407 292 329         Results from last 7 days   Lab Units 01/04/22  0908   CHOLESTEROL mg/dL 157         Results from last 7 days   Lab Units 01/04/22  0908   CHOLESTEROL mg/dL 157   TRIGLYCERIDES mg/dL 140   HDL CHOL mg/dL 26*   LDL CHOL mg/dL 106*           I reviewed the patient's new clinical results.  I personally viewed and interpreted the patient's chest x-ray.        Medication Review:   cefepime, 2 g, Intravenous, Q8H  cholecalciferol, 1,000 Units, Oral, Daily  dexamethasone, 6 mg, Oral, Q12H  enoxaparin, 40 mg, Subcutaneous, Q24H  famotidine, 20 mg, Oral, Daily  pravastatin, 10 mg, Oral, Nightly  remdesivir, 100 mg, Intravenous, Q24H              ASSESSMENT:   Acute hypoxemic respiratory failure  COVID-19 pneumonia  Elevated D-dimer  JOSHUA  Obesity      PLAN:  Events noted chart reviewed.  Reviewed recommendations from previous pulmonologist  Oxygen requirement still significant high flow 13 to 15 L.  Continue to wean down as tolerated keep sats above 90%  Agree with current management with Decadron and remdesivir.  Appreciate input from ID further antibiotic management per ID  Intolerant to CPAP  Mobilize and ambulate  Continue to monitor clinical course closely.      Raeann Whitehead MD  01/04/22  15:11 EST

## 2022-01-04 NOTE — PLAN OF CARE
Goal Outcome Evaluation:              Outcome Summary: sitting up in bed; encouraged repositioning in bed and up in chair; desats quickly to low 80's with any exertion; occasional productive cough with clear sputum; encouraged IS; demonstrating adequately

## 2022-01-05 NOTE — THERAPY EVALUATION
Patient Name: Renaldo Rodriguez  : 1965    MRN: [id number]                              Today's Date: 2022       Admit Date: 2022    Visit Dx:     ICD-10-CM ICD-9-CM   1. Pneumonia due to COVID-19 virus  U07.1 480.8    J12.82 079.89   2. Acute respiratory failure with hypoxia (HCC)  J96.01 518.81     Patient Active Problem List   Diagnosis   • Neuritis or radiculitis due to rupture of lumbar intervertebral disc   • Pneumonia due to COVID-19 virus   • Acute respiratory failure with hypoxia and hypercapnia (HCC)   • Elevated liver enzymes   • Lymphocytopenia   • Acute transaminitis due to COVID-19 viral infection   • Class 2 severe obesity with serious comorbidity in adult (HCC)   • JOSHUA (obstructive sleep apnea)   • Bacterial pneumonia   • Abnormal EKG   • Hyperlipidemia, elevated LDL     Past Medical History:   Diagnosis Date   • Biceps muscle strain 2021    torn bicep tendon   • Injury of back     lifted something at work, playing golf   • Lumbar herniated disc    • Numbness and tingling of right lower extremity    • Right leg pain      Past Surgical History:   Procedure Laterality Date   • BICEPS TENDON REPAIR Right    • LUMBAR DISCECTOMY Right 10/4/2019    Procedure: Right lumbar 4 to lumbar 5 laminectomy and discectomy with metrx;  Surgeon: Ghanshyam Canas MD;  Location: [facility];  Service: Neurosurgery   • NO PAST SURGERIES        General Information     Row Name 22 1532          Physical Therapy Time and Intention    Document Type evaluation  -DJ     Mode of Treatment individual therapy; physical therapy  -DJ     Row Name 22 1532          General Information    Patient Profile Reviewed yes  -DJ     Prior Level of Function independent:; ADL's; gait; work  -DJ     Existing Precautions/Restrictions right; lifting; other (see comments)  0 lifting R UE  -DJ     Row Name 22 1532          Living Environment    Lives With spouse  -DJ     Row Name 22 1532          Home  Main Entrance    Number of Stairs, Main Entrance three; two  -DJ     Row Name 01/05/22 1532          Stairs Within Home, Primary    Stairs, Within Home, Primary 2 flights in home - 1 to upstairs bedroom, 1 flight to basement  -DJ     Row Name 01/05/22 1532          Cognition    Orientation Status (Cognition) oriented x 4  pleasant and cooperative  -DJ     Row Name 01/05/22 1532          Safety Issues, Functional Mobility    Impairments Affecting Function (Mobility) endurance/activity tolerance; shortness of breath  -DJ     Comment, Safety Issues/Impairments (Mobility) gt belt, nonskid socks, O2  -DJ           User Key  (r) = Recorded By, (t) = Taken By, (c) = Cosigned By    Initials Name Provider Type    Marilu Hernández, PT Physical Therapist               Mobility     Row Name 01/05/22 1534          Bed Mobility    Bed Mobility supine-sit  -DJ     Supine-Sit [place] (Bed Mobility) standby assist; verbal cues  -DJ     Sit-Supine [place] (Bed Mobility) not tested; other (see comments)  placed in chair  -DJ     Assistive Device (Bed Mobility) bed rails; head of bed elevated  -DJ     Comment (Bed Mobility) moves well  -DJ     Row Name 01/05/22 1534          Transfers    Comment (Transfers) sit/stand from EOB  -DJ     Row Name 01/05/22 1534          Bed-Chair Transfer    Bed-Chair [place] (Transfers) contact guard; verbal cues  -DJ     Assistive Device (Bed-Chair Transfers) other (see comments)  0 AD  -DJ     Row Name 01/05/22 1534          Sit-Stand Transfer    Sit-Stand [place] (Transfers) contact guard; verbal cues  -DJ     Assistive Device (Sit-Stand Transfers) other (see comments)  0 AD  -DJ     Row Name 01/05/22 1534          Gait/Stairs (Locomotion)    [place] Level (Gait) contact guard; verbal cues  -DJ     Assistive Device (Gait) walker, front-wheeled  -DJ     Distance in Feet (Gait) Pt unable to amb any distance due to optiflow tether but did MIP  -DJ     Deviations/Abnormal Patterns  (Gait) festinating/shuffling  -DJ     Bilateral Gait Deviations forward flexed posture  -DJ     [place] Level (Stairs) not tested  -DJ     Comment (Gait/Stairs) Pt unable to amb any distance due to optiflow tether but did MIP and eprform minisquats. O2 decreased to 78% but recovers quickly with seated rest break  -DJ           User Key  (r) = Recorded By, (t) = Taken By, (c) = Cosigned By    Initials Name Provider Type    Marilu Hernández, PT Physical Therapist               Obj/Interventions     Row Name 01/05/22 1537          Range of Motion Comprehensive    General Range of Motion no range of motion deficits identified  -DJ     Comment, General Range of Motion R elbow deferred  -DJ     Row Name 01/05/22 1537          Strength Comprehensive (MMT)    Comment, General Manual Muscle Testing (MMT) Assessment good extremity strength - R elbow deferred  -DJ     Row Name 01/05/22 1537          Motor Skills    Motor Skills functional endurance  -DJ     Functional Endurance fatigues and desats quickly but also recovers quickly with seated rest break  -DJ     Therapeutic Exercise other (see comments)  AP, LAQ, seated marching; standing MIP, mini squats  -DJ     Row Name 01/05/22 1537          Balance    Balance Assessment sitting static balance; standing static balance; standing dynamic balance  -DJ     Static Sitting Balance WNL; unsupported; sitting, edge of bed  -DJ     Static Standing Balance WFL; supported; standing  -DJ     Dynamic Standing Balance mild impairment; supported; standing  -DJ     Balance Interventions sitting; sit to stand; supported; standing; weight shifting activity  -DJ     Comment, Balance req use of r wx for balance while performing standing exs  -DJ     Row Name 01/05/22 1537          Sensory Assessment (Somatosensory)    Sensory Assessment (Somatosensory) sensation intact  -DJ           User Key  (r) = Recorded By, (t) = Taken By, (c) = Cosigned By    Initials Name Provider Type    DEAN Hutchinson  Marilu, PT Physical Therapist               Goals/Plan     Row Name 01/05/22 1545          Bed Mobility Goal 1 (PT)    Activity/Assistive Device (Bed Mobility Goal 1, PT) sit to supine; supine to sit  -DJ     [place] Level/Cues Needed (Bed Mobility Goal 1, PT) modified independence  -DJ     Time Frame (Bed Mobility Goal 1, PT) 2 weeks  -DJ     Row Name 01/05/22 1545          Transfer Goal 1 (PT)    Activity/Assistive Device (Transfer Goal 1, PT) sit-to-stand/stand-to-sit  -DJ     [place] Level/Cues Needed (Transfer Goal 1, PT) modified independence; verbal cues required  -DJ     Time Frame (Transfer Goal 1, PT) 2 weeks  -DJ     Row Name 01/05/22 1543          Gait Training Goal 1 (PT)    Activity/Assistive Device (Gait Training Goal 1, PT) gait (walking locomotion); improve balance and speed; increase endurance/gait distance; increase energy conservation  -DJ     [place] Level (Gait Training Goal 1, PT) standby assist  -DJ     Distance (Gait Training Goal 1, PT) 100'  -DJ     Time Frame (Gait Training Goal 1, PT) 2 weeks  -DJ     Row Name 01/05/22 1544          Patient Education Goal (PT)    Activity (Patient Education Goal, PT) HEP  -DJ     [place]/Cues/Accuracy (Memory Goal 2, PT) demonstrates adequately; verbalizes understanding  -DJ     Time Frame (Patient Education Goal, PT) 3 days; short term goal (STG)  -DJ           User Key  (r) = Recorded By, (t) = Taken By, (c) = Cosigned By    Initials Name Provider Type    Marilu Hernández, PT Physical Therapist               Clinical Impression     Row Name 01/05/22 7744          Pain    Additional Documentation Pain Scale: Numbers Pre/Post-Treatment (Group)  -DJ     Row Name 01/05/22 153          Pain Scale: Numbers Pre/Post-Treatment    Pretreatment Pain Rating 0/10 - no pain  -DJ     Pre/Posttreatment Pain Comment c/c is SOB with activities  -DJ     Row Name 01/05/22 5588          Plan of Care Review    Plan of Care Reviewed With patient  -DJ      Outcome Summary 55yo obese white male admitted 1/2/22 with SOB and Covid pna. PMH includes recent R bicep tendon repair as well as acute resp failure with hypoxia, disc bulge. PLOF: Pt lives at home with wife with 2-3 ROSINA and 2 full flights of stairs (1 to upstairs bedroom and 1 to basement). Pt was independent with ADLs and IADLs including working at [email or web address]. Pt amb without AD. He is primarily limited at this [facility] by generalized weakness and SOB with increased activities that decrease his functional mobility. Today, he req CGA for bedmobility and sit/stand from EOB. He performed seated ther ex. Pt perofrmed standing MIP and mini squats. Unable to progress amb due to optiflow tether and decreased O2 with activities (decreased to 78%). Pt would benefit from skilled PT acutely for ther ex, gt/transfer training, bed mobility, balance, endurance, and HEP instruction. Anticipate return home upon d/c.  -DJ     Row Name 01/05/22 1535          Therapy Assessment/Plan (PT)    Patient/Family Therapy Goals Statement (PT) home  -DJ     Rehab Potential (PT) good, to achieve stated therapy goals  -DJ     Criteria for Skilled Interventions Met (PT) yes; meets criteria; skilled treatment is necessary  -DJ     Row Name 01/05/22 9108          Vital Signs    O2 Delivery Pre Treatment other (see comments)  optiflow  -DJ     O2 Delivery Intra Treatment other (see comments)  optiflow  -DJ     O2 Delivery Post Treatment other (see comments)  optiflow  -DJ     Pre Patient Position Supine  -DJ     Intra Patient Position Standing  -DJ     Post Patient Position Sitting  -DJ     Row Name 01/05/22 8535          Positioning and Restraints    Pre-Treatment Position in bed  -DJ     Post Treatment Position chair  -DJ     In Chair reclined; call light within reach; encouraged to call for assist; with nsg  -DJ           User Key  (r) = Recorded By, (t) = Taken By, (c) = Cosigned By    Initials Name Provider Type    Marilu Hernández, PT Physical  Therapist               Outcome Measures     Row Name 01/05/22 1546          How much help from another person do you currently need...    Turning from your back to your side while in flat bed without using bedrails? 4  -DJ     Moving from lying on back to sitting on the side of a flat bed without bedrails? 3  -DJ     Moving to and from a bed to a chair (including a wheelchair)? 3  -DJ     Standing up from a chair using your arms (e.g., wheelchair, bedside chair)? 3  -DJ     Climbing 3-5 steps with a railing? 2  -DJ     To walk in hospital room? 3  -DJ     AM-PAC 6 Clicks Score (PT) 18  -DJ     Row Name 01/05/22 1546          Functional Assessment    Outcome Measure Options AM-PAC 6 Clicks Basic Mobility (PT)  -           User Key  (r) = Recorded By, (t) = Taken By, (c) = Cosigned By    Initials Name Provider Type    Marilu Hernández, PT Physical Therapist                             Physical Therapy Education                 Title: PT OT SLP Therapies (Done)     Topic: Physical Therapy (Done)     Point: Mobility training (Done)     Learning Progress Summary           Patient Acceptance, TB, DU by  at 1/5/2022 1547                   Point: Home exercise program (Done)     Learning Progress Summary           Patient Acceptance, TB, DU by  at 1/5/2022 1547                   Point: Body mechanics (Done)     Learning Progress Summary           Patient Acceptance, TB, DU by  at 1/5/2022 1547                   Point: Precautions (Done)     Learning Progress Summary           Patient Acceptance, TB, DU by  at 1/5/2022 1547                               User Key     Initials Effective Dates Name Provider Type Discipline     10/25/19 -  Marilu Hutchinson, PT Physical Therapist PT              PT Recommendation and Plan  Planned Therapy Interventions (PT): balance training, bed mobility training, gait training, home exercise program, strengthening, postural re-education, patient/family education, transfer training  Plan  of Care Reviewed With: patient  Outcome Summary: 57yo obese white male admitted 1/2/22 with SOB and Covid pna. PMH includes recent R bicep tendon repair as well as acute resp failure with hypoxia, disc bulge. PLOF: Pt lives at home with wife with 2-3 ROSINA and 2 full flights of stairs (1 to upstairs bedroom and 1 to basement). Pt was independent with ADLs and IADLs including working at [employer]. Pt amb without AD. He is primarily limited at this [facility] by generalized weakness and SOB with increased activities that decrease his functional mobility. Today, he req CGA for bedmobility and sit/stand from EOB. He performed seated ther ex. Pt perofrmed standing MIP and mini squats. Unable to progress amb due to optiflow tether and decreased O2 with activities (decreased to 78%). Pt would benefit from skilled PT acutely for ther ex, gt/transfer training, bed mobility, balance, endurance, and HEP instruction. Anticipate return home upon d/c.     Time Calculation:    PT Charges     Row Name 01/05/22 1550             Time Calculation    Start Time 1507  -DJ      Stop Time 1532  -DJ      Time Calculation (min) 25 min  -DJ      PT Non-Billable Time (min) 10 min  -DJ      PT Received On 01/05/22  -DJ      PT - Next Appointment 01/07/22  -DEAN      PT Goal Re-Cert Due Date 01/19/22  -DJ            User Key  (r) = Recorded By, (t) = Taken By, (c) = Cosigned By    Initials Name Provider Type    Marilu Hernández, OCTAVIO Physical Therapist              Therapy Charges for Today     Code Description Service Date Service Provider Modifiers Qty    73522699257 HC PT EVAL MOD COMPLEXITY 2 1/5/2022 Marilu Hutchinson, PT GP 1    32176786701 HC PT THER PROC EA 15 MIN 1/5/2022 Marilu Hutchinson, PT GP 2          PT G-Codes  Outcome Measure Options: AM-PAC 6 Clicks Basic Mobility (PT)  AM-PAC 6 Clicks Score (PT): 18    Mrailu Hutchinson PT  1/5/2022

## 2022-01-05 NOTE — PLAN OF CARE
Goal Outcome Evaluation:              Outcome Summary: pt remains on optiflow at 60L/100%; desats with little exertion; needs encouragement to increase activity; up in chair; belongings within reach; encouraged IS and OPEP; Tylenol given for generalized discomfort

## 2022-01-05 NOTE — PROGRESS NOTES
[facility] Medicine Services  PROGRESS NOTE    Patient Name: Renaldo Rodriguez  : 1965  MRN: [id number]    Date of Admission: 2022  Primary Care Physician: Provider, No Known    Subjective   Subjective     CC:  Follow-up Covid pneumonia    HPI:  Still feeling weak, shortness of breath stable.  Appetite starting to improve.  Denies new complaints.    Review of Systems  No current headache or lightheadedness  No current nausea, vomiting, or diarrhea  No current chest pain or palpitations      Objective   Objective     Vital Signs:   Temp:  [99.1 °F (37.3 °C)-99.7 °F (37.6 °C)] 99.4 °F (37.4 °C)  Heart Rate:  [63-84] 75  Resp:  [18] 18  BP: (120-132)/(74-83) 132/82        Physical Exam:  Constitutional:Awake, alert, ill-appearing  HENT: NCAT, mucous membranes moist, neck supple  Respiratory: Persistent coarse respiratory sounds, cough is noted, breathing is labored, decreased tachypneic, currently on high oxygen needs  Cardiovascular: RRR, normal radial pulses  Gastrointestinal: Positive bowel sounds, soft, nontender, nondistended  Musculoskeletal: Significantly obese, BMI is 36, minimal lower extremity edema  Psychiatric: Calm affect, cooperative, conversational  Neurologic: No slurred speech or facial droop, follows commands  Skin: No rashes or jaundice, warm      Results Reviewed:  Results from last 7 days   Lab Units 22  0623 22  0908 22  0207 22   WBC 10*3/mm3 11.04* 10.41 8.23   < > 10.20   HEMOGLOBIN g/dL 11.9* 12.2* 12.2*   < > 14.2   HEMATOCRIT % 35.5* 37.5 36.4*   < > 40.9   PLATELETS 10*3/mm3 447 407 292   < > 329   PROCALCITONIN ng/mL  --  5.86*  --   --  2.38*    < > = values in this interval not displayed.     Results from last 7 days   Lab Units 22  0623 22  0908 22  1340 22  0207 22   SODIUM mmol/L 138 134*  --  135*   < > 136   POTASSIUM mmol/L 4.0 3.7  --  3.8   < > 3.9   CHLORIDE mmol/L 103  102  --  100   < > 96*   CO2 mmol/L 21.6* 21.8*  --  22.2   < > 19.8*   BUN mg/dL 23* 25*  --  17   < > 15   CREATININE mg/dL 0.92 0.80  --  1.18   < > 1.17   GLUCOSE mg/dL 141* 147*  --  133*   < > 128*   CALCIUM mg/dL 8.4* 8.5*  --  8.4*   < > 8.6   ALT (SGPT) U/L 44* 35  --  38   < > 46*  46*   AST (SGOT) U/L 50* 47*  --  54*   < > 72*  69*   TROPONIN T ng/mL  --  <0.010 <0.010 <0.010   < > <0.010   PROBNP pg/mL  --   --   --   --   --  355.9    < > = values in this interval not displayed.     Estimated Creatinine Clearance: 106.6 mL/min (by C-G formula based on SCr of 0.92 mg/dL).    Microbiology Results Abnormal     Procedure Component Value - Date/Time    Respiratory Culture - Sputum, Cough [[id number]] Collected: 01/04/22 1552    Lab Status: Preliminary result Specimen: Sputum from Cough Updated: 01/05/22 0809     Respiratory Culture Light growth (2+) The culture consists of normal respiratory jana. This is a preliminary report; final report to follow.     Gram Stain Few (2+) WBCs per low power field      Moderate (3+) Mixed bacterial jana      Few (2+) Epithelial cells per low power field    Blood Culture - Blood, Arm, Left [[id number]]  (Normal) Collected: 01/02/22 2027    Lab Status: Preliminary result Specimen: Blood from Arm, Left Updated: 01/04/22 2045     Blood Culture No growth at 2 days          Imaging Results (Last 24 Hours)     ** No results found for the last 24 hours. **              I have reviewed the medications:  Scheduled Meds:cefepime, 2 g, Intravenous, Q8H  cholecalciferol, 1,000 Units, Oral, Daily  dexamethasone, 6 mg, Oral, Q12H  enoxaparin, 40 mg, Subcutaneous, Q24H  famotidine, 20 mg, Oral, Daily  pravastatin, 10 mg, Oral, Nightly  remdesivir, 100 mg, Intravenous, Q24H      Continuous Infusions:   PRN Meds:.•  acetaminophen  •  melatonin  •  nitroglycerin  •  ondansetron **OR** ondansetron  •  sodium chloride    Assessment/Plan   Assessment & Plan     Active Hospital Problems     Diagnosis  POA   • **Pneumonia due to COVID-19 virus [U07.1, J12.82]  Yes   • Hyperlipidemia, elevated LDL [E78.41]  Yes   • Lymphocytopenia [D72.810]  Yes   • Acute transaminitis due to COVID-19 viral infection [B17.9]  Yes   • Class 2 severe obesity with serious comorbidity in adult (HCC) [E66.01]  Yes   • JOSHUA (obstructive sleep apnea) [G47.33]  Yes   • Bacterial pneumonia [J15.9]  Yes   • Abnormal EKG [R94.31]  Yes   • Acute respiratory failure with hypoxia and hypercapnia (HCC) [J96.01, J96.02]  Unknown   • Elevated liver enzymes [R74.8]  Yes   • Neuritis or radiculitis due to rupture of lumbar intervertebral disc [M51.16]  Yes      Resolved Hospital Problems    Diagnosis Date Resolved POA   • Metabolic acidosis [E87.2] 01/03/2022 Yes        Brief Hospital Course to date:  Renaldo Rodriguez is a 56 y.o. male presents to the hospital with COVID-19 pneumonia and secondary bacterial pneumonia.  Patient had been attempting to treat himself at home with equine agricultural ivermectin.    Discussion/plan for today:  Repeat chest x-ray today to reassess.  Ceftriaxone transition to cefepime per ID.  Completing remdesivir.  Continue dexamethasone.  Wean oxygen if tolerated later today seems to have plateaued.  Repeat EKG improved off ivermectin.    COVID-19 pneumonia with secondary respiratory failure, lymphocytopenia, transaminitis and bacterial pneumonia:  Aggressive treatment for COVID-19 with dexamethasone and remdesivir.  Encourage self proning, advance oxygen to OptiFlow.  May need BiPAP if worsens.  Patient wishes to be DNI after extensive conversation.  Lymphocytopenia is common with COVID-19.  Monitor blood counts.  Transaminitis likely related to Covid associated viral hepatitis.  Trend.  Supportive care and symptom treatment.  Flutter valve and incentive spirometer.  Cefepime treatment for bacterial pneumonia.  Trend inflammatory markers.  CT angiogram negative for pulmonary embolus.  GI prophylaxis with H2  blocker.    Obstructive sleep apnea: Further complicates issue.  Supplemental oxygen and pulse oximetry monitoring.  Patient has never been on CPAP.    Abnormal EKG, adverse reaction to ivermectin, hyperlipidemia:  EKG abnormal at admission but no ACS.  Likely ivermectin associated EKG changes.  Repeat EKG 1/4.  Recommend outpatient cardiology evaluation.  LDL notably at 106.  Start low-dose pravastatin, there is some evidence statin may be helpful with Covid infection.  ST depression in lateral leads resolved with discontinuing ivermectin.    Obesity:  Further complicates illness.  Supportive care and symptom treatment.  Eventual weight loss recommended.    History of lumbar degenerative disc disease and neuropathic pain:  No longer taking gabapentin.  Tylenol as needed for now.    Metabolic acidosis: Noted on admission.  Monitor, improved..    DVT Prophylaxis: Lovenox    Disposition: Pending course    CODE STATUS:   Code Status and Medical Interventions:   Ordered at: 01/04/22 1929     Level Of Support Discussed With:    Patient     Code Status (Patient has no pulse and is not breathing):    CPR (Attempt to Resuscitate)     Medical Interventions (Patient has pulse or is breathing):    Full Support       John Maher MD  01/05/22

## 2022-01-05 NOTE — PLAN OF CARE
Goal Outcome Evaluation:  Plan of Care Reviewed With: patient           Outcome Summary: 57yo obese white male admitted 1/2/22 with SOB and Covid pna. PMH includes recent R bicep tendon repair as well as acute resp failure with hypoxia, disc bulge. PLOF: Pt lives at home with wife with 2-3 ROSINA and 2 full flights of stairs (1 to upstairs bedroom and 1 to basement). Pt was independent with ADLs and IADLs including working at [employer]. Pt amb without AD. He is primarily limited at this tiem by generalized weakness and SOB with increased activities that decrease his functional mobility. Today, he req CGA for bedmobility and sit/stand from EOB. He performed seated ther ex. Pt perofrmed standing MIP and mini squats. Unable to progress amb due to optiflow tether and decreased O2 with activities (decreased to 78%). Pt would benefit from skilled PT acutely for ther ex, gt/transfer training, bed mobility, balance, endurance, and HEP instruction. Anticipate return home upon d/c.  Patient was not wearing a face mask during this therapy encounter. Therapist used appropriate personal protective equipment including gown, eye protection, mask and gloves.  Mask used was  capr . Appropriate PPE was worn during the entire therapy session. Hand hygiene was completed before and after therapy session. Patient is in enhanced droplet precautions.

## 2022-01-05 NOTE — PROGRESS NOTES
" LOS: 3 days     Chief Complaint: Covid pneumonia    Interval History: Patient reports she is somewhat disheartened this morning with his lack of progress.  Patient remains afebrile with slight increase in leukocytosis to 11 likely in the setting of steroids.  He reports tolerating antibiotics well.      He remains on 15 L high flow this morning.  CRP is responding nicely as well as ferritin and LDH.  Procalcitonin did increase and was changed to cefepime yesterday.    Vital Signs  Temp:  [99.1 °F (37.3 °C)-99.7 °F (37.6 °C)] 99.4 °F (37.4 °C)  Heart Rate:  [63-84] 75  Resp:  [18] 18  BP: (120-132)/(74-83) 132/82    Physical Exam:  General: In no acute distress  HEENT: Oropharynx clear, moist mucous membranes  Cardiovascular: RRR, normal S1 and S2, no M/R/G  Respiratory: Clear to ascultation bilaterally, no wheezing  GI: Soft, NT/ND, + bowel sounds bilaterally, no masses  Skin: No rashes or lesions  Extremities: No edema, cyanosis.  Right upper extremity with casting in place.  Access: Peripheral IV    Antibiotics:  Anti-Infectives (From admission, onward)    Ordered     Dose/Rate Route Frequency Start Stop    01/04/22 1105  cefepime 2 gm IVPB in 100 ml NS (VTB)        Ordering Provider: Tigre Ward,     2 g  over 4 Hours Intravenous Every 8 Hours 01/04/22 2000 01/09/22 2359    01/04/22 1105  cefepime 2 gm IVPB in 100 ml NS (VTB)        Ordering Provider: Tigre Ward, DO    2 g  over 30 Minutes Intravenous Once 01/04/22 1200 01/04/22 1156    01/02/22 2236  remdesivir 100 mg in sodium chloride 0.9 % 270 mL IVPB (powder vial)        Ordering Provider: Renaldo Cooper APRN   \"Followed by\" Linked Group Details    100 mg  over 60 Minutes Intravenous Every 24 Hours 01/03/22 2237 01/07/22 2237    01/02/22 2236  remdesivir 200 mg in sodium chloride 0.9 % 290 mL IVPB (powder vial)        Ordering Provider: Renaldo Cooper APRN   \"Followed by\" Linked Group Details    200 mg  over 60 Minutes " Intravenous Every 24 Hours 01/02/22 2238 01/03/22 0359    01/02/22 2150  cefTRIAXone (ROCEPHIN) 1 g in sodium chloride 0.9 % 100 mL IVPB-VTB        Ordering Provider: Krishna Hebert MD    1 g  200 mL/hr over 30 Minutes Intravenous Once 01/02/22 2152 01/02/22 2323           Results Review:     I reviewed the patient's new clinical results.    Lab Results   Component Value Date    WBC 11.04 (H) 01/05/2022    HGB 11.9 (L) 01/05/2022    HCT 35.5 (L) 01/05/2022    MCV 83.3 01/05/2022     01/05/2022     Lab Results   Component Value Date    GLUCOSE 141 (H) 01/05/2022    BUN 23 (H) 01/05/2022    CREATININE 0.92 01/05/2022    EGFRIFNONA 85 01/05/2022    BCR 25.0 01/05/2022    CO2 21.6 (L) 01/05/2022    CALCIUM 8.4 (L) 01/05/2022    ALBUMIN 3.10 (L) 01/05/2022    AST 50 (H) 01/05/2022    ALT 44 (H) 01/05/2022       Microbiology:  12/31 Covid positive  1/2 blood culture in process  1/2 Covid positive  1/4 respiratory culture in process     Radiology:  12/31 chest x-ray with bilateral patchy infiltrates consistent with pneumonia.     1/2 chest x-ray with bilateral patchy infiltrates     1/3 CT angiogram of the chest without pulmonary emboli and findings of diffuse bilateral patchy groundglass opacities    Assessment/Plan   #Acute hypoxic respiratory failure in the setting of bilateral Covid pneumonia  #Bacterial pneumonia  #History of JOSHUA complicating the above     Continuing IV remdesivir for 5 days and dexamethasone daily.    Will continue IV cefepime 2 g every 8 hours for empiric bacterial pneumonia coverage given elevated procalcitonin and due to this would not recommend IL-6 inhibitor therapy.  Would plan for minimum 5 days of therapy with antibiotics. Follow daily LFTs, CRP creatinine daily.  Respiratory culture in process.  Continue with supportive care    ID will follow.

## 2022-01-05 NOTE — PLAN OF CARE
Goal Outcome Evaluation:  Plan of Care Reviewed With: patient        Progress: no change  Outcome Summary: Has slept intervals. Denies pain soa with exertion. Remains maxed on airvo 60/100. Sats 88-91 %. VSS. Enc use of IS. Occ productive cough. No changes. No distress.

## 2022-01-05 NOTE — PROGRESS NOTES
"      [facility]         Dr Cary Sayied   LOS: 3 days   Patient Care Team:  Provider, No Known as PCP - General    Chief Complaint: Acute hypoxemic respiratory failure with COVID-19 pneumonia elevated dimer with CT angiogram negative for PE JOSHUA intolerant to CPAP    Interval History: Events noted chart reviewed.  He has been transitioned to heated high flow currently 100% with 60 L.  In good spirits but desats with minimal activity    REVIEW OF SYSTEMS:   CARDIOVASCULAR: No chest pain, chest pressure or chest discomfort. No palpitations or edema.   RESPIRATORY: Short of breath with activity  GASTROINTESTINAL: No anorexia, nausea, vomiting or diarrhea. No abdominal pain or blood.   HEMATOLOGIC: No bleeding or bruising.     Ventilator/Non-Invasive Ventilation Settings (From admission, onward)            None            Vital Signs  Temp:  [99.1 °F (37.3 °C)-99.8 °F (37.7 °C)] 99.8 °F (37.7 °C)  Heart Rate:  [63-84] 66  Resp:  [18] 18  BP: (120-132)/(74-82) 132/82    Intake/Output Summary (Last 24 hours) at 1/5/2022 1451  Last data filed at 1/5/2022 0900  Gross per 24 hour   Intake 720 ml   Output 775 ml   Net -55 ml     Flowsheet Rows      First Filed Value   Admission Height 175 cm (68.9\") Documented at 01/02/2022 2025   Admission Weight 111 kg (244 lb 11.4 oz) Documented at 01/02/2022 2025          Physical Exam:  Patient is examined using the personal protective equipment as per guidelines from infection control for this particular patient as enacted.  Hand hygiene was performed before and after patient interaction.   General Appearance:    Alert, cooperative, in no acute distress.  Following simple commands  Neck midline trachea, no thyromegaly   Lungs:    Diminished breath sounds with rhonchi bilaterally on the basis    Heart:    Regular rhythm and normal rate, normal S1 and S2, no            murmur, no gallop, no rub, no click   Chest Wall:    No abnormalities observed   Abdomen:     Normal " bowel sounds, no masses, no organomegaly, soft        nontender, nondistended, no guarding, no rebound                tenderness   Extremities:   Moves all extremities well, no edema, no cyanosis, no             redness  CNS no focal neurological deficits normal sensory exam  Skin no rashes no nodules  Musculoskeletal no cyanosis no clubbing normal range of motion     Results Review:        Results from last 7 days   Lab Units 01/05/22  0623 01/04/22  0908 01/04/22  0908 01/03/22 0207 01/03/22 0207   SODIUM mmol/L 138  --  134*  --  135*   POTASSIUM mmol/L 4.0  --  3.7  --  3.8   CHLORIDE mmol/L 103  --  102  --  100   CO2 mmol/L 21.6*  --  21.8*  --  22.2   BUN mg/dL 23*  --  25*  --  17   CREATININE mg/dL 0.92  --  0.80  --  1.18   GLUCOSE mg/dL 141*   < > 147*   < > 133*   CALCIUM mg/dL 8.4*  --  8.5*  --  8.4*    < > = values in this interval not displayed.     Results from last 7 days   Lab Units 01/05/22  0623 01/04/22  0908 01/03/22  1340 01/03/22  0207 01/02/22 2019 01/02/22 2019   CK TOTAL U/L 572* 844*  --   --   --  1,229*   TROPONIN T ng/mL  --  <0.010 <0.010 <0.010   < > <0.010    < > = values in this interval not displayed.     Results from last 7 days   Lab Units 01/05/22 0623 01/04/22  0908 01/03/22 0207   WBC 10*3/mm3 11.04* 10.41 8.23   HEMOGLOBIN g/dL 11.9* 12.2* 12.2*   HEMATOCRIT % 35.5* 37.5 36.4*   PLATELETS 10*3/mm3 447 407 292         Results from last 7 days   Lab Units 01/04/22  0908   CHOLESTEROL mg/dL 157         Results from last 7 days   Lab Units 01/04/22  0908   CHOLESTEROL mg/dL 157   TRIGLYCERIDES mg/dL 140   HDL CHOL mg/dL 26*   LDL CHOL mg/dL 106*           I reviewed the patient's new clinical results.  I personally viewed and interpreted the patient's chest x-ray.        Medication Review:   cefepime, 2 g, Intravenous, Q8H  cholecalciferol, 1,000 Units, Oral, Daily  dexamethasone, 6 mg, Oral, Q12H  enoxaparin, 40 mg, Subcutaneous, Q24H  famotidine, 20 mg, Oral,  Daily  pravastatin, 10 mg, Oral, Nightly  remdesivir, 100 mg, Intravenous, Q24H             ASSESSMENT:   Acute hypoxemic respiratory failure  COVID-19 pneumonia  Elevated D-dimer  JOSHUA  Obesity      PLAN:  Worsening oxygen requirement now transition to heated high flow 60 L 100%.  Will attempt to wean to keep sats above 90%.  D-dimer rising but CT angiogram -2 days ago.  Patient remains on DVT prophylaxis will increase to twice daily  Agree with current management with Decadron and remdesivir.  Appreciate input from ID further antibiotic management per ID  Intolerant to CPAP  Mobilize and ambulate  Continue to monitor clinical course closely.  High risk for decompensation and possibly requiring noninvasive ventilation      Raeann Whitehead MD  01/05/22  14:51 EST

## 2022-01-05 NOTE — PAYOR COMM NOTE
"Cecil Conde (56 y.o. Male)     PLEASE SEE ATTACHED FOR INPT CONTINUED STAY REVIEW.     REF#[id number]    PLEASE CALL [phone] OR  [phone]    THANK YOU    SHANTE KEVIN LPN CCP            Date of Birth Social Security Number Address Home Phone MRN    1965  [id number] [facility] [phone] [id number]    Jewish Marital Status             Sabianist        Admission Date Admission Type Admitting Provider Attending Provider Department, Room/Bed    1/2/22 Emergency John Maher MD Furlow, Stephen Matthew, MD [address], [address]    Discharge Date Discharge Disposition Discharge Destination                         Attending Provider: John Maher MD    Allergies: No Known Allergies    Isolation: Enh Drop/Con   Infection: COVID (confirmed) (01/01/22)   Code Status: CPR   Advance Care Planning Activity    Ht: 175 cm (68.9\")   Wt: 107 kg (236 lb 9.6 oz)    Admission Cmt: None   Principal Problem: Pneumonia due to COVID-19 virus [U07.1,J12.82]                 Active Insurance as of 1/2/2022     Primary Coverage     Payor Plan Insurance Group Employer/Plan Group    [employer] COMMERCIAL AETNA [id number]     Payor Plan Address Payor Plan Phone Number Payor Plan Fax Number Effective Dates    [address] [phone]  12/31/2021 - None Entered    [facility] [id number]       Subscriber Name Subscriber Birth Date Member ID       CECIL CONDE 1965 [id number]                 Emergency Contacts      (Rel.) Home Phone Work Phone Mobile Phone    Ban Conde (Spouse) [phone] -- --    Samson Conde (Brother) -- -- [phone]            Oxygen Therapy (last day)     Date/Time SpO2 Device (Oxygen Therapy) Flow (L/min) Oxygen Concentration (%) ETCO2 (mmHg)    01/05/22 0913 91 heated; humidified; high-flow nasal cannula 60 100 --    01/05/22 0827 94 heated; high-flow nasal cannula; humidified 60 100 --    " 01/05/22 0700 96 heated; high-flow nasal cannula -- -- --    01/04/22 2320 95 heated; high-flow nasal cannula -- -- --    01/04/22 2220 -- heated; high-flow nasal cannula 60 100 --    01/04/22 2053 94 high-flow nasal cannula; heated; humidified 60 100 --    01/04/22 1950 94 heated; high-flow nasal cannula; humidified -- -- --    01/04/22 1556 95 heated; high-flow nasal cannula; humidified 60 100 --    01/04/22 1405 -- heated; humidified; high-flow nasal cannula 60 100 --    01/04/22 1300 93 heated; high-flow nasal cannula; humidified -- -- --    01/04/22 0846 93 heated; high-flow nasal cannula; humidified 60 100 --    01/04/22 0706 91 heated; high-flow nasal cannula 60 100 --    01/04/22 0445 93 heated; high-flow nasal cannula -- -- --    01/04/22 0010 -- heated; high-flow nasal cannula 60 100 --          Intake & Output (last day)       01/04 0701 01/05 0700 01/05 0701 01/06 0700    P.O. 480 240    Total Intake(mL/kg) 480 (4.5) 240 (2.2)    Urine (mL/kg/hr) 1175 (0.5)     Total Output 1175     Net -695 +240              Lines, Drains & Airways     Active LDAs     Name Placement date Placement time Site Days    Peripheral IV 01/02/22 2023 Left Antecubital 01/02/22 2023  Antecubital  2                  Medication Administration Report for Renaldo Rodriguez as of 01/05/22 1218   Legend:    Given Hold Not Given Due Canceled Entry Other Actions    Time Time (Time) Time  Time-Action       Discontinued     Completed     Future     MAR Hold     Linked           Medications 01/03/22 01/04/22 01/05/22    acetaminophen (TYLENOL) tablet 650 mg  Dose: 650 mg  Freq: Every 4 Hours PRN Route: PO  PRN Reasons: Mild Pain ,Fever  Start: 01/02/22 2235   Admin Instructions:   Do not exceed 4 grams of acetaminophen in a 24 hr period.    If given for pain, use the following pain scale:   Mild Pain = Pain Score of 1-3, CPOT 1-2  Moderate Pain = Pain Score of 4-6, CPOT 3-4  Severe Pain = Pain Score of 7-10, CPOT 5-8  Do not exceed 4  grams of acetaminophen in a 24 hr period. Max dose of 2gm for AST/ALT greater than 120 units/L      If given for pain, use the following pain scale:   Mild Pain = Pain Score of 1-3, CPOT 1-2  Moderate Pain = Pain Score of 4-6, CPOT 3-4  Severe Pain = Pain Score of 7-10, CPOT 5-8          cefepime 2 gm IVPB in 100 ml NS (VTB)  Dose: 2 g  Freq: Every 8 Hours Route: IV  Indications of Use: PNEUMONIA  Start: 01/04/22 2000   End: 01/09/22 2359        2333-New Bag             0814-New Bag     1600            cholecalciferol (VITAMIN D3) tablet 1,000 Units  Dose: 1,000 Units  Freq: Daily Route: PO  Start: 01/03/22 1000       1120-Given             0837-Given             0853-Given             dexamethasone (DECADRON) tablet 6 mg  Dose: 6 mg  Freq: Every 12 Hours Scheduled Route: PO  Start: 01/03/22 0900   Admin Instructions:   Take with food.       1121-Given     2313-Given            0837-Given     2214-Given            0814-Given     2100            enoxaparin (LOVENOX) syringe 40 mg  Dose: 40 mg  Freq: Every 24 Hours Route: SC  Indications of Use: PROPHYLAXIS OF VENOUS THROMBOEMBOLISM  Start: 01/02/22 2330   Admin Instructions:   Give subcutaneous in abdomen only. Do not massage site after injection.       0215-Given     2314-Given            2213-Given     2330-Canceled Entry            2330             famotidine (PEPCID) tablet 20 mg  Dose: 20 mg  Freq: Daily Route: PO  Start: 01/03/22 0900       1338-Given             0836-Given             0814-Given             melatonin tablet 3 mg  Dose: 3 mg  Freq: Nightly PRN Route: PO  PRN Reason: Sleep  Start: 01/02/22 2235          nitroglycerin (NITROSTAT) SL tablet 0.4 mg  Dose: 0.4 mg  Freq: Every 5 Minutes PRN Route: SL  PRN Reason: Chest Pain  PRN Comment: Only if SBP Greater Than 100  Start: 01/02/22 2235   Admin Instructions:   If Pain Unrelieved After 3 Doses Notify MD          ondansetron (ZOFRAN) tablet 4 mg  Dose: 4 mg  Freq: Every 4 Hours PRN Route: PO  PRN  Reasons: Nausea,Vomiting  Start: 01/02/22 2235         Or  ondansetron (ZOFRAN) injection 4 mg  Dose: 4 mg  Freq: Every 4 Hours PRN Route: IV  PRN Reasons: Nausea,Vomiting  Start: 01/02/22 2235          pravastatin (PRAVACHOL) tablet 10 mg  Dose: 10 mg  Freq: Nightly Route: PO  Start: 01/04/22 2100   Admin Instructions:   Avoid grapefruit juice.        2214-Given             2100             remdesivir 200 mg in sodium chloride 0.9 % 290 mL IVPB (powder vial)  Dose: 200 mg  Freq: Every 24 Hours Route: IV  Start: 01/02/22 2238   End: 01/03/22 0359   Admin Instructions:   Ensure all of the drug is administered. Flush line with 30mL NS after administration.   Order specific questions:   Days Since Symptom Onset: </= 6 Days         0149-New Bag     0359-Stopped             Followed by  remdesivir 100 mg in sodium chloride 0.9 % 270 mL IVPB (powder vial)  Dose: 100 mg  Freq: Every 24 Hours Route: IV  Start: 01/03/22 2237   End: 01/07/22 2237   Admin Instructions:   Ensure all of the drug is administered. Flush line with 30mL NS after administration.        0009-New Bag     2213-New Bag            2238             sodium chloride 0.9 % flush 10 mL  Dose: 10 mL  Freq: As Needed Route: IV  PRN Reason: Line Care  Start: 01/02/22 2015        0837-Given             0814-Given            Completed Medications  Medications 01/03/22 01/04/22 01/05/22       cefepime 2 gm IVPB in 100 ml NS (VTB)  Dose: 2 g  Freq: Once Route: IV  Start: 01/04/22 1200   End: 01/04/22 1156        1126-New Bag              cefTRIAXone (ROCEPHIN) 1 g in sodium chloride 0.9 % 100 mL IVPB-VTB  Dose: 1 g  Freq: Once Route: IV  Indications of Use: PNEUMONIA  Start: 01/02/22 2152   End: 01/02/22 2323   Admin Instructions:   Caution: Look alike/sound alike drug alert          dexamethasone (DECADRON) injection 6 mg  Dose: 6 mg  Freq: Once Route: IV  Start: 01/02/22 2034   End: 01/02/22 2049   Admin Instructions:   For IV administration.  May be pushed over a  minimum of 1 minute.          iopamidol (ISOVUE-370) 76 % injection 100 mL  Dose: 100 mL  Freq: Once in Imaging Route: IV  Start: 22 0654   End: 22 0700       0700-Given by Other              Discontinued Medications  Medications 22       cefTRIAXone (ROCEPHIN) 1 g in sodium chloride 0.9 % 100 mL IVPB-VTB  Dose: 1 g  Freq: Every 24 Hours Route: IV  Indications of Use: PNEUMONIA  Start: 22 2100   End: 22 1105   Admin Instructions:   Caution: Look alike/sound alike drug alert       2314-New Bag               dexamethasone (DECADRON) tablet 6 mg  Dose: 6 mg  Freq: Daily With Breakfast Route: PO  Start: 22 0800   End: 22 0608   Admin Instructions:   Take with food.          Pharmacy Consult - Remdesivir  Freq: Continuous PRN Route: XX  PRN Reason: Consult  Start: 22   End: 22   Order specific questions:   Days Since Symptom Onset: </= 6 Days            Pharmacy to Dose enoxaparin (LOVENOX)  Freq: Continuous PRN Route: XX  PRN Reason: Consult  Indications Comment: Prophylaxis of Venous Thromboembolism  Start: 22   End: 22   Order specific questions:   Indication of use Prophylaxis                   Operative/Procedure Notes (last 24 hours)  Notes from 22 1218 through 22 1218   No notes of this type exist for this encounter.            Physician Progress Notes (last 24 hours)      John Maher MD at 22 1123              [facility] Medicine Services  PROGRESS NOTE    Patient Name: Renaldo Rodriguez  : 1965  MRN: [id number]    Date of Admission: 2022  Primary Care Physician: Provider, No Known    Subjective   Subjective     CC:  Follow-up Covid pneumonia    HPI:  Still feeling weak, shortness of breath stable.  Appetite starting to improve.  Denies new complaints.    Review of Systems  No current headache or lightheadedness  No current nausea, vomiting, or diarrhea  No current  chest pain or palpitations      Objective   Objective     Vital Signs:   Temp:  [99.1 °F (37.3 °C)-99.7 °F (37.6 °C)] 99.4 °F (37.4 °C)  Heart Rate:  [63-84] 75  Resp:  [18] 18  BP: (120-132)/(74-83) 132/82        Physical Exam:  Constitutional:Awake, alert, ill-appearing  HENT: NCAT, mucous membranes moist, neck supple  Respiratory: Persistent coarse respiratory sounds, cough is noted, breathing is labored, decreased tachypneic, currently on high oxygen needs  Cardiovascular: RRR, normal radial pulses  Gastrointestinal: Positive bowel sounds, soft, nontender, nondistended  Musculoskeletal: Significantly obese, BMI is 36, minimal lower extremity edema  Psychiatric: Calm affect, cooperative, conversational  Neurologic: No slurred speech or facial droop, follows commands  Skin: No rashes or jaundice, warm      Results Reviewed:  Results from last 7 days   Lab Units 01/05/22  0623 01/04/22  0908 01/03/22  0207 01/02/22 2019 01/02/22 2019   WBC 10*3/mm3 11.04* 10.41 8.23   < > 10.20   HEMOGLOBIN g/dL 11.9* 12.2* 12.2*   < > 14.2   HEMATOCRIT % 35.5* 37.5 36.4*   < > 40.9   PLATELETS 10*3/mm3 447 407 292   < > 329   PROCALCITONIN ng/mL  --  5.86*  --   --  2.38*    < > = values in this interval not displayed.     Results from last 7 days   Lab Units 01/05/22  0623 01/04/22  0908 01/03/22  1340 01/03/22  0207 01/02/22 2019 01/02/22 2019   SODIUM mmol/L 138 134*  --  135*   < > 136   POTASSIUM mmol/L 4.0 3.7  --  3.8   < > 3.9   CHLORIDE mmol/L 103 102  --  100   < > 96*   CO2 mmol/L 21.6* 21.8*  --  22.2   < > 19.8*   BUN mg/dL 23* 25*  --  17   < > 15   CREATININE mg/dL 0.92 0.80  --  1.18   < > 1.17   GLUCOSE mg/dL 141* 147*  --  133*   < > 128*   CALCIUM mg/dL 8.4* 8.5*  --  8.4*   < > 8.6   ALT (SGPT) U/L 44* 35  --  38   < > 46*  46*   AST (SGOT) U/L 50* 47*  --  54*   < > 72*  69*   TROPONIN T ng/mL  --  <0.010 <0.010 <0.010   < > <0.010   PROBNP pg/mL  --   --   --   --   --  355.9    < > = values in this  interval not displayed.     Estimated Creatinine Clearance: 106.6 mL/min (by C-G formula based on SCr of 0.92 mg/dL).    Microbiology Results Abnormal     Procedure Component Value - Date/Time    Respiratory Culture - Sputum, Cough [[id number]] Collected: 01/04/22 1552    Lab Status: Preliminary result Specimen: Sputum from Cough Updated: 01/05/22 0809     Respiratory Culture Light growth (2+) The culture consists of normal respiratory jana. This is a preliminary report; final report to follow.     Gram Stain Few (2+) WBCs per low power field      Moderate (3+) Mixed bacterial jana      Few (2+) Epithelial cells per low power field    Blood Culture - Blood, Arm, Left [[id number]]  (Normal) Collected: 01/02/22 2027    Lab Status: Preliminary result Specimen: Blood from Arm, Left Updated: 01/04/22 2045     Blood Culture No growth at 2 days          Imaging Results (Last 24 Hours)     ** No results found for the last 24 hours. **              I have reviewed the medications:  Scheduled Meds:cefepime, 2 g, Intravenous, Q8H  cholecalciferol, 1,000 Units, Oral, Daily  dexamethasone, 6 mg, Oral, Q12H  enoxaparin, 40 mg, Subcutaneous, Q24H  famotidine, 20 mg, Oral, Daily  pravastatin, 10 mg, Oral, Nightly  remdesivir, 100 mg, Intravenous, Q24H      Continuous Infusions:   PRN Meds:.•  acetaminophen  •  melatonin  •  nitroglycerin  •  ondansetron **OR** ondansetron  •  sodium chloride    Assessment/Plan   Assessment & Plan     Active Hospital Problems    Diagnosis  POA   • **Pneumonia due to COVID-19 virus [U07.1, J12.82]  Yes   • Hyperlipidemia, elevated LDL [E78.41]  Yes   • Lymphocytopenia [D72.810]  Yes   • Acute transaminitis due to COVID-19 viral infection [B17.9]  Yes   • Class 2 severe obesity with serious comorbidity in adult (HCC) [E66.01]  Yes   • JOSHUA (obstructive sleep apnea) [G47.33]  Yes   • Bacterial pneumonia [J15.9]  Yes   • Abnormal EKG [R94.31]  Yes   • Acute respiratory failure with hypoxia and  hypercapnia (HCC) [J96.01, J96.02]  Unknown   • Elevated liver enzymes [R74.8]  Yes   • Neuritis or radiculitis due to rupture of lumbar intervertebral disc [M51.16]  Yes      Resolved Hospital Problems    Diagnosis Date Resolved POA   • Metabolic acidosis [E87.2] 01/03/2022 Yes        Brief Hospital Course to date:  Renaldo Rodriguez is a 56 y.o. male presents to the hospital with COVID-19 pneumonia and secondary bacterial pneumonia.  Patient had been attempting to treat himself at home with equine agricultural ivermectin.    Discussion/plan for today:  Repeat chest x-ray today to reassess.  Ceftriaxone transition to cefepime per ID.  Completing remdesivir.  Continue dexamethasone.  Wean oxygen if tolerated later today seems to have plateaued.  Repeat EKG improved off ivermectin.    COVID-19 pneumonia with secondary respiratory failure, lymphocytopenia, transaminitis and bacterial pneumonia:  Aggressive treatment for COVID-19 with dexamethasone and remdesivir.  Encourage self proning, advance oxygen to OptiFlow.  May need BiPAP if worsens.  Patient wishes to be DNI after extensive conversation.  Lymphocytopenia is common with COVID-19.  Monitor blood counts.  Transaminitis likely related to Covid associated viral hepatitis.  Trend.  Supportive care and symptom treatment.  Flutter valve and incentive spirometer.  Cefepime treatment for bacterial pneumonia.  Trend inflammatory markers.  CT angiogram negative for pulmonary embolus.  GI prophylaxis with H2 blocker.    Obstructive sleep apnea: Further complicates issue.  Supplemental oxygen and pulse oximetry monitoring.  Patient has never been on CPAP.    Abnormal EKG, adverse reaction to ivermectin, hyperlipidemia:  EKG abnormal at admission but no ACS.  Likely ivermectin associated EKG changes.  Repeat EKG 1/4.  Recommend outpatient cardiology evaluation.  LDL notably at 106.  Start low-dose pravastatin, there is some evidence statin may be helpful with Covid  infection.  ST depression in lateral leads resolved with discontinuing ivermectin.    Obesity:  Further complicates illness.  Supportive care and symptom treatment.  Eventual weight loss recommended.    History of lumbar degenerative disc disease and neuropathic pain:  No longer taking gabapentin.  Tylenol as needed for now.    Metabolic acidosis: Noted on admission.  Monitor, improved..    DVT Prophylaxis: Lovenox    Disposition: Pending course    CODE STATUS:   Code Status and Medical Interventions:   Ordered at: 01/04/22 1929     Level Of Support Discussed With:    Patient     Code Status (Patient has no pulse and is not breathing):    CPR (Attempt to Resuscitate)     Medical Interventions (Patient has pulse or is breathing):    Full Support       John Maher MD  01/05/22      Electronically signed by John Maher MD at 01/05/22 1132     Tigre Ward DO at 01/05/22 0932           LOS: 3 days     Chief Complaint: Covid pneumonia    Interval History: Patient reports she is somewhat disheartened this morning with his lack of progress.  Patient remains afebrile with slight increase in leukocytosis to 11 likely in the setting of steroids.  He reports tolerating antibiotics well.      He remains on 15 L high flow this morning.  CRP is responding nicely as well as ferritin and LDH.  Procalcitonin did increase and was changed to cefepime yesterday.    Vital Signs  Temp:  [99.1 °F (37.3 °C)-99.7 °F (37.6 °C)] 99.4 °F (37.4 °C)  Heart Rate:  [63-84] 75  Resp:  [18] 18  BP: (120-132)/(74-83) 132/82    Physical Exam:  General: In no acute distress  HEENT: Oropharynx clear, moist mucous membranes  Cardiovascular: RRR, normal S1 and S2, no M/R/G  Respiratory: Clear to ascultation bilaterally, no wheezing  GI: Soft, NT/ND, + bowel sounds bilaterally, no masses  Skin: No rashes or lesions  Extremities: No edema, cyanosis.  Right upper extremity with casting in place.  Access: Peripheral  "IV    Antibiotics:  Anti-Infectives (From admission, onward)    Ordered     Dose/Rate Route Frequency Start Stop    01/04/22 1105  cefepime 2 gm IVPB in 100 ml NS (VTB)        Ordering Provider: Tigre Ward, DO    2 g  over 4 Hours Intravenous Every 8 Hours 01/04/22 2000 01/09/22 2359    01/04/22 1105  cefepime 2 gm IVPB in 100 ml NS (VTB)        Ordering Provider: Tigre Ward,     2 g  over 30 Minutes Intravenous Once 01/04/22 1200 01/04/22 1156    01/02/22 2236  remdesivir 100 mg in sodium chloride 0.9 % 270 mL IVPB (powder vial)        Ordering Provider: Renaldo Cooper APRN   \"Followed by\" Linked Group Details    100 mg  over 60 Minutes Intravenous Every 24 Hours 01/03/22 2237 01/07/22 2237    01/02/22 2236  remdesivir 200 mg in sodium chloride 0.9 % 290 mL IVPB (powder vial)        Ordering Provider: Renaldo Cooper APRN   \"Followed by\" Linked Group Details    200 mg  over 60 Minutes Intravenous Every 24 Hours 01/02/22 2238 01/03/22 0359    01/02/22 2150  cefTRIAXone (ROCEPHIN) 1 g in sodium chloride 0.9 % 100 mL IVPB-VTB        Ordering Provider: Krishna Hebert MD    1 g  200 mL/hr over 30 Minutes Intravenous Once 01/02/22 2152 01/02/22 2323           Results Review:     I reviewed the patient's new clinical results.    Lab Results   Component Value Date    WBC 11.04 (H) 01/05/2022    HGB 11.9 (L) 01/05/2022    HCT 35.5 (L) 01/05/2022    MCV 83.3 01/05/2022     01/05/2022     Lab Results   Component Value Date    GLUCOSE 141 (H) 01/05/2022    BUN 23 (H) 01/05/2022    CREATININE 0.92 01/05/2022    EGFRIFNONA 85 01/05/2022    BCR 25.0 01/05/2022    CO2 21.6 (L) 01/05/2022    CALCIUM 8.4 (L) 01/05/2022    ALBUMIN 3.10 (L) 01/05/2022    AST 50 (H) 01/05/2022    ALT 44 (H) 01/05/2022       Microbiology:  12/31 Covid positive  1/2 blood culture in process  1/2 Covid positive  1/4 respiratory culture in process     Radiology:  12/31 chest x-ray with bilateral patchy " infiltrates consistent with pneumonia.     1/2 chest x-ray with bilateral patchy infiltrates     1/3 CT angiogram of the chest without pulmonary emboli and findings of diffuse bilateral patchy groundglass opacities    Assessment/Plan   #Acute hypoxic respiratory failure in the setting of bilateral Covid pneumonia  #Bacterial pneumonia  #History of JOSHUA complicating the above     Continuing IV remdesivir for 5 days and dexamethasone daily.    Will continue IV cefepime 2 g every 8 hours for empiric bacterial pneumonia coverage given elevated procalcitonin and due to this would not recommend IL-6 inhibitor therapy.  Would plan for minimum 5 days of therapy with antibiotics. Follow daily LFTs, CRP creatinine daily.  Respiratory culture in process.  Continue with supportive care    ID will follow.       Electronically signed by Tigre Ward DO at 01/05/22 0919     Raeann Whitehead MD at 01/04/22 1511                [facility]         Dr Raeann Whitehead   LOS: 2 days   Patient Care Team:  Provider, No Known as PCP - General    Chief Complaint: Acute hypoxemic respiratory failure with COVID-19 pneumonia elevated dimer with CT angiogram negative for PE JOSHUA intolerant to CPAP    Interval History: Events noted chart reviewed.  Oxygen requirement between 13 to 15 L high flow.  Feels better today.    REVIEW OF SYSTEMS:   CARDIOVASCULAR: No chest pain, chest pressure or chest discomfort. No palpitations or edema.   RESPIRATORY: Short of breath with activity  GASTROINTESTINAL: No anorexia, nausea, vomiting or diarrhea. No abdominal pain or blood.   HEMATOLOGIC: No bleeding or bruising.     Ventilator/Non-Invasive Ventilation Settings (From admission, onward)            None            Vital Signs  Temp:  [98.7 °F (37.1 °C)-99.4 °F (37.4 °C)] 99.4 °F (37.4 °C)  Heart Rate:  [58-70] 70  Resp:  [18-24] 18  BP: (113-137)/() 127/83    Intake/Output Summary (Last 24 hours) at 1/4/2022 1511  Last data filed at  "1/4/2022 1131  Gross per 24 hour   Intake --   Output 725 ml   Net -725 ml     Flowsheet Rows      First Filed Value   Admission Height 175 cm (68.9\") Documented at 01/02/2022 2025   Admission Weight 111 kg (244 lb 11.4 oz) Documented at 01/02/2022 2025          Physical Exam:  Patient is examined using the personal protective equipment as per guidelines from infection control for this particular patient as enacted.  Hand hygiene was performed before and after patient interaction.   General Appearance:    Alert, cooperative, in no acute distress.  Following simple commands  Neck midline trachea, no thyromegaly   Lungs:    Diminished breath sounds with rhonchi bilaterally on the basis    Heart:    Regular rhythm and normal rate, normal S1 and S2, no            murmur, no gallop, no rub, no click   Chest Wall:    No abnormalities observed   Abdomen:     Normal bowel sounds, no masses, no organomegaly, soft        nontender, nondistended, no guarding, no rebound                tenderness   Extremities:   Moves all extremities well, no edema, no cyanosis, no             redness  CNS no focal neurological deficits normal sensory exam  Skin no rashes no nodules  Musculoskeletal no cyanosis no clubbing normal range of motion     Results Review:        Results from last 7 days   Lab Units 01/04/22  0908 01/03/22  0207 01/03/22  0207 01/02/22 2019 01/02/22 2019   SODIUM mmol/L 134*  --  135*  --  136   POTASSIUM mmol/L 3.7  --  3.8  --  3.9   CHLORIDE mmol/L 102  --  100  --  96*   CO2 mmol/L 21.8*  --  22.2  --  19.8*   BUN mg/dL 25*  --  17  --  15   CREATININE mg/dL 0.80  --  1.18  --  1.17   GLUCOSE mg/dL 147*   < > 133*   < > 128*   CALCIUM mg/dL 8.5*  --  8.4*  --  8.6    < > = values in this interval not displayed.     Results from last 7 days   Lab Units 01/04/22  0908 01/03/22  1340 01/03/22  0207 01/02/22 2019 01/02/22 2019   CK TOTAL U/L 844*  --   --   --  1,229*   TROPONIN T ng/mL <0.010 <0.010 <0.010   < > " <0.010    < > = values in this interval not displayed.     Results from last 7 days   Lab Units 01/04/22  0908 01/03/22  0207 01/02/22 2019   WBC 10*3/mm3 10.41 8.23 10.20   HEMOGLOBIN g/dL 12.2* 12.2* 14.2   HEMATOCRIT % 37.5 36.4* 40.9   PLATELETS 10*3/mm3 407 292 329         Results from last 7 days   Lab Units 01/04/22  0908   CHOLESTEROL mg/dL 157         Results from last 7 days   Lab Units 01/04/22  0908   CHOLESTEROL mg/dL 157   TRIGLYCERIDES mg/dL 140   HDL CHOL mg/dL 26*   LDL CHOL mg/dL 106*           I reviewed the patient's new clinical results.  I personally viewed and interpreted the patient's chest x-ray.        Medication Review:   cefepime, 2 g, Intravenous, Q8H  cholecalciferol, 1,000 Units, Oral, Daily  dexamethasone, 6 mg, Oral, Q12H  enoxaparin, 40 mg, Subcutaneous, Q24H  famotidine, 20 mg, Oral, Daily  pravastatin, 10 mg, Oral, Nightly  remdesivir, 100 mg, Intravenous, Q24H             ASSESSMENT:   Acute hypoxemic respiratory failure  COVID-19 pneumonia  Elevated D-dimer  JOSHUA  Obesity      PLAN:  Events noted chart reviewed.  Reviewed recommendations from previous pulmonologist  Oxygen requirement still significant high flow 13 to 15 L.  Continue to wean down as tolerated keep sats above 90%  Agree with current management with Decadron and remdesivir.  Appreciate input from ID further antibiotic management per ID  Intolerant to CPAP  Mobilize and ambulate  Continue to monitor clinical course closely.      Raeann Whitehead MD  01/04/22  15:11 EST            Electronically signed by Raeann Whitehead MD at 01/04/22 3326

## 2022-01-05 NOTE — NURSING NOTE
Discussed with pt his code status.  He would like to be full code to include full support, including intubation.

## 2022-01-06 NOTE — PROGRESS NOTES
"      [facility]         Dr Cary Sayied   LOS: 4 days   Patient Care Team:  Provider, No Known as PCP - General    Chief Complaint: Acute hypoxemic respiratory failure with COVID-19 pneumonia elevated dimer with CT angiogram negative for PE JOSHUA intolerant to CPAP    Interval History: Remains on heated high flow 6 L 100%.  Maintaining sats above 90%.  Shortness of breath and desaturation with minimal activity    REVIEW OF SYSTEMS:   CARDIOVASCULAR: No chest pain, chest pressure or chest discomfort. No palpitations or edema.   RESPIRATORY: Short of breath with activity  GASTROINTESTINAL: No anorexia, nausea, vomiting or diarrhea. No abdominal pain or blood.   HEMATOLOGIC: No bleeding or bruising.     Ventilator/Non-Invasive Ventilation Settings (From admission, onward)            None            Vital Signs  Temp:  [98.9 °F (37.2 °C)-101.3 °F (38.5 °C)] 99.5 °F (37.5 °C)  Heart Rate:  [53-73] 73  Resp:  [18] 18  BP: (119-134)/(75-90) 119/75    Intake/Output Summary (Last 24 hours) at 1/6/2022 1502  Last data filed at 1/6/2022 1400  Gross per 24 hour   Intake 740 ml   Output 1000 ml   Net -260 ml     Flowsheet Rows      First Filed Value   Admission Height 175 cm (68.9\") Documented at 01/02/2022 2025   Admission Weight 111 kg (244 lb 11.4 oz) Documented at 01/02/2022 2025          Physical Exam:  Patient is examined using the personal protective equipment as per guidelines from infection control for this particular patient as enacted.  Hand hygiene was performed before and after patient interaction.   General Appearance:    Alert, cooperative, in no acute distress.  Following simple commands  Neck midline trachea, no thyromegaly   Lungs:    Diminished breath sounds with rhonchi bilaterally on the basis    Heart:    Regular rhythm and normal rate, normal S1 and S2, no            murmur, no gallop, no rub, no click   Chest Wall:    No abnormalities observed   Abdomen:     Normal bowel sounds, no masses, " no organomegaly, soft        nontender, nondistended, no guarding, no rebound                tenderness   Extremities:   Moves all extremities well, no edema, no cyanosis, no             redness  CNS no focal neurological deficits normal sensory exam  Skin no rashes no nodules  Musculoskeletal no cyanosis no clubbing normal range of motion     Results Review:        Results from last 7 days   Lab Units 01/06/22  1018 01/05/22  0623 01/05/22  0623 01/04/22  0908 01/04/22  0908   SODIUM mmol/L 138  --  138  --  134*   POTASSIUM mmol/L 4.4  --  4.0  --  3.7   CHLORIDE mmol/L 105  --  103  --  102   CO2 mmol/L 21.5*  --  21.6*  --  21.8*   BUN mg/dL 21*  --  23*  --  25*   CREATININE mg/dL 0.90  --  0.92  --  0.80   GLUCOSE mg/dL 106*   < > 141*   < > 147*   CALCIUM mg/dL 8.2*  --  8.4*  --  8.5*    < > = values in this interval not displayed.     Results from last 7 days   Lab Units 01/06/22  1018 01/05/22  0623 01/04/22  0908 01/03/22  1340 01/03/22  0207 01/02/22 2019   CK TOTAL U/L 350* 572* 844*  --   --    < >   TROPONIN T ng/mL  --   --  <0.010 <0.010 <0.010  --     < > = values in this interval not displayed.     Results from last 7 days   Lab Units 01/06/22  1018 01/05/22  0623 01/04/22  0908   WBC 10*3/mm3 11.05* 11.04* 10.41   HEMOGLOBIN g/dL 12.7* 11.9* 12.2*   HEMATOCRIT % 38.3 35.5* 37.5   PLATELETS 10*3/mm3 429 447 407         Results from last 7 days   Lab Units 01/04/22  0908   CHOLESTEROL mg/dL 157         Results from last 7 days   Lab Units 01/04/22  0908   CHOLESTEROL mg/dL 157   TRIGLYCERIDES mg/dL 140   HDL CHOL mg/dL 26*   LDL CHOL mg/dL 106*           I reviewed the patient's new clinical results.  I personally viewed and interpreted the patient's chest x-ray.        Medication Review:   cefepime, 2 g, Intravenous, Q8H  cholecalciferol, 1,000 Units, Oral, Daily  dexamethasone, 6 mg, Oral, Q12H  enoxaparin, 40 mg, Subcutaneous, Q12H  famotidine, 20 mg, Oral, Daily  pravastatin, 10 mg, Oral,  Nightly  remdesivir, 100 mg, Intravenous, Q24H             ASSESSMENT:   Acute hypoxemic respiratory failure  COVID-19 pneumonia  Elevated D-dimer  JOSHUA  Obesity      PLAN:  Oxygen requirement stable but still requiring significant amount of oxygen 60 L 100% heated high flow.  Will attempt to wean to keep sats above 90%.  D-dimer was rising but CT angiogram was negative recently.  Patient remains on DVT high-dose prophylaxis.  If no improvement in oxygenation may need to repeat CT angiogram in the next 24 to 48 hours  Agree with current management with Decadron and remdesivir.  Appreciate input from ID further antibiotic management per ID  Intolerant to CPAP  Mobilize and ambulate  Continue to monitor clinical course closely.  High risk for decompensation and possibly requiring noninvasive ventilation versus intubation      Raeann Whitehead MD  01/06/22  15:02 EST

## 2022-01-06 NOTE — PROGRESS NOTES
[facility]  PROGRESS NOTE    Patient Name: Renaldo Rodriguez  : 1965  MRN: [id number]    Date of Admission: 2022  Primary Care Physician: Provider, No Known    Subjective   Subjective     CC:  Follow-up Covid pneumonia    HPI:  Still weak.  Notes more fevers today.  Breathing still poor but stable.  Eating continues to be improved.    Review of Systems  No current headache or lightheadedness  No current nausea, vomiting, or diarrhea  No current chest pain or palpitations    Objective   Objective     Vital Signs:   Temp:  [98.9 °F (37.2 °C)-101.3 °F (38.5 °C)] 100.3 °F (37.9 °C)  Heart Rate:  [53-70] 64  Resp:  [18] 18  BP: (120-165)/(78-90) 132/89        Physical Exam:  Constitutional:Awake, alert, ill-appearing  HENT: NCAT, mucous membranes moist, neck supple  Respiratory: Persistent coarse respiratory sounds with occasional wheeze, cough is noted, breathing is labored, mild tachypneic, currently on high oxygen needs  Cardiovascular: RRR, normal radial pulses  Gastrointestinal: Positive bowel sounds, soft, nontender, nondistended  Musculoskeletal: Significantly obese, BMI is 36, minimal lower extremity edema  Psychiatric: Calm affect, cooperative, conversational  Neurologic: No slurred speech or facial droop, follows commands  Skin: No rashes or jaundice, warm      Results Reviewed:  Results from last 7 days   Lab Units 22  0623 22  0908 22  0207 22   WBC 10*3/mm3 11.04* 10.41 8.23   < > 10.20   HEMOGLOBIN g/dL 11.9* 12.2* 12.2*   < > 14.2   HEMATOCRIT % 35.5* 37.5 36.4*   < > 40.9   PLATELETS 10*3/mm3 447 407 292   < > 329   PROCALCITONIN ng/mL  --  5.86*  --   --  2.38*    < > = values in this interval not displayed.     Results from last 7 days   Lab Units 22  0623 22  0908 22  1340 22  0207 22   SODIUM mmol/L 138 134*  --  135*   < > 136   POTASSIUM mmol/L 4.0 3.7  --  3.8   < > 3.9    CHLORIDE mmol/L 103 102  --  100   < > 96*   CO2 mmol/L 21.6* 21.8*  --  22.2   < > 19.8*   BUN mg/dL 23* 25*  --  17   < > 15   CREATININE mg/dL 0.92 0.80  --  1.18   < > 1.17   GLUCOSE mg/dL 141* 147*  --  133*   < > 128*   CALCIUM mg/dL 8.4* 8.5*  --  8.4*   < > 8.6   ALT (SGPT) U/L 44* 35  --  38   < > 46*  46*   AST (SGOT) U/L 50* 47*  --  54*   < > 72*  69*   TROPONIN T ng/mL  --  <0.010 <0.010 <0.010   < > <0.010   PROBNP pg/mL  --   --   --   --   --  355.9    < > = values in this interval not displayed.     Estimated Creatinine Clearance: 106.6 mL/min (by C-G formula based on SCr of 0.92 mg/dL).    Microbiology Results Abnormal     Procedure Component Value - Date/Time    Respiratory Culture - Sputum, Cough [[id number]] Collected: 01/04/22 1552    Lab Status: Final result Specimen: Sputum from Cough Updated: 01/06/22 0656     Respiratory Culture Light growth (2+) Normal respiratory jana. No S. aureus or Pseudomonas aeruginosa detected. Final report.     Gram Stain Few (2+) WBCs per low power field      Moderate (3+) Mixed bacterial jana      Few (2+) Epithelial cells per low power field    Blood Culture - Blood, Arm, Left [[id number]]  (Normal) Collected: 01/02/22 2027    Lab Status: Preliminary result Specimen: Blood from Arm, Left Updated: 01/05/22 2045     Blood Culture No growth at 3 days    MRSA Screen, PCR (Inpatient) - Swab, Nares [[id number]]  (Normal) Collected: 01/05/22 1435    Lab Status: Final result Specimen: Swab from Nares Updated: 01/05/22 1614     MRSA PCR No MRSA Detected          Imaging Results (Last 24 Hours)     Procedure Component Value Units Date/Time    XR Chest 1 View [[id number]] Collected: 01/05/22 1646     Updated: 01/05/22 1958    Narrative:      PORTABLE CHEST     HISTORY: COVID, respiratory failure.     COMPARISON: Chest x-ray 01/02/2022.     A single portable view of the chest demonstrates the heart to be within  normal limits in size. Bilateral pulmonary infiltrates are  appreciated  involving predominantly the right upper lobe and the left lung laterally  and inferolaterally. The infiltrate involving the right upper lobe is  more prominent as compared to 01/02/2022. The infiltrate involving the  left lung appears similar to slightly more prominent as compared to the  prior examination. There is no evidence of gross effusion.       Impression:      Bilateral pulmonary infiltrates as described with the  infiltrate involving the right upper lobe being more prominent as  compared to 01/02/2022. The larger area of infiltrate involving the left  lung laterally and at the left lung base appears similar to slightly  more prominent as compared to the prior examination.     This report was finalized on 1/5/2022 7:55 PM by Dr. John Dye M.D.                 I have reviewed the medications:  Scheduled Meds:cefepime, 2 g, Intravenous, Q8H  cholecalciferol, 1,000 Units, Oral, Daily  dexamethasone, 6 mg, Oral, Q12H  enoxaparin, 40 mg, Subcutaneous, Q12H  famotidine, 20 mg, Oral, Daily  pravastatin, 10 mg, Oral, Nightly  remdesivir, 100 mg, Intravenous, Q24H      Continuous Infusions:   PRN Meds:.•  acetaminophen  •  melatonin  •  nitroglycerin  •  ondansetron **OR** ondansetron  •  sodium chloride    Assessment/Plan   Assessment & Plan     Active Hospital Problems    Diagnosis  POA   • **Pneumonia due to COVID-19 virus [U07.1, J12.82]  Yes   • Hyperlipidemia, elevated LDL [E78.41]  Yes   • Lymphocytopenia [D72.810]  Yes   • Acute transaminitis due to COVID-19 viral infection [B17.9]  Yes   • Class 2 severe obesity with serious comorbidity in adult (HCC) [E66.01]  Yes   • JOSHUA (obstructive sleep apnea) [G47.33]  Yes   • Bacterial pneumonia [J15.9]  Yes   • Abnormal EKG [R94.31]  Yes   • Acute respiratory failure with hypoxia and hypercapnia ([facility]) [J96.01, J96.02]  Unknown   • Elevated liver enzymes [R74.8]  Yes   • Neuritis or radiculitis due to rupture of lumbar intervertebral disc [M51.16]   Yes      Resolved Hospital Problems    Diagnosis Date Resolved POA   • Metabolic acidosis [E87.2] 01/03/2022 Yes        Brief Hospital Course to date:  Renaldo Rodriguez is a 56 y.o. male presents to the hospital with COVID-19 pneumonia and secondary bacterial pneumonia.  Patient had been attempting to treat himself at home with equine agricultural ivermectin.    Discussion/plan for today:  Repeat x-ray images reviewed.  Infiltrates more prominent.  Patient remains severely ill on high oxygen needs but oxygen needs and.  To continue to have plateaued at current level.  Continuing antibacterial, antiviral, and steroid therapy and wean oxygen as tolerated.  Repeat EKG images reviewed and changes felt to be related to ivermectin-normalized, particularly ST depression noted in lateral leads on admission.  Case discussed with his wife over the phone for an update.      COVID-19 pneumonia with secondary respiratory failure, lymphocytopenia, transaminitis and bacterial pneumonia:  Aggressive treatment for COVID-19 with dexamethasone and remdesivir.  Encourage self proning, advance oxygen to [employer].  CODE STATUS updated patient now wishes to be full code and is okay with intubation.  Lymphocytopenia is common with COVID-19.  Monitor blood counts.  Transaminitis likely related to Covid associated viral hepatitis.  Trend.  Supportive care and symptom treatment.  Flutter valve and incentive spirometer.  Cefepime treatment for bacterial pneumonia including pseudomonal coverage, MRSA nasal PCR negative.  Trend inflammatory markers.  CT angiogram negative for pulmonary embolus.  GI prophylaxis with H2 blocker.    Obstructive sleep apnea: Further complicates issue.  Supplemental oxygen and pulse oximetry monitoring.  Patient has never been on CPAP.    Abnormal EKG, adverse reaction to ivermectin, hyperlipidemia:  EKG abnormal at admission but no ACS.  Likely ivermectin associated EKG changes.  Repeat EKG 1/4.  Recommend outpatient  cardiology evaluation.  LDL notably at 106.  Start low-dose pravastatin, there is some evidence statin may be helpful with Covid infection.  ST depression in lateral leads resolved with discontinuing ivermectin.    Obesity:  Further complicates illness.  Supportive care and symptom treatment.  Eventual weight loss recommended.    History of lumbar degenerative disc disease and neuropathic pain:  No longer taking gabapentin.  Tylenol as needed for now.    Metabolic acidosis: Noted on admission.  Monitor, improved..    Mixed hyperlipidemia: Continue pravastatin    DVT Prophylaxis: Lovenox    Disposition: Pending course    CODE STATUS:   Code Status and Medical Interventions:   Ordered at: 01/04/22 1920     Level Of Support Discussed With:    Patient     Code Status (Patient has no pulse and is not breathing):    CPR (Attempt to Resuscitate)     Medical Interventions (Patient has pulse or is breathing):    Full Support       John Maher MD  01/06/22

## 2022-01-06 NOTE — CASE MANAGEMENT/SOCIAL WORK
Continued Stay Note  [facility]     Patient Name: Renaldo Rodriguez  MRN: [id number]  Today's Date: 1/6/2022    Admit Date: 1/2/2022     Discharge Plan     Row Name 01/06/22 1535       Plan    Plan Home w/ family transport. Follow for home O2 needs    Patient/Family in Agreement with Plan yes    Plan Comments CCP spoke to patient’s spouse over the phone regarding d/c planning. Plan remains home, spouse to transport. Patient’s spouse reports they have a hospital bed they can put on the main floor if he is unable to get up the stairs to his bedroom. Patient is currently on high flow oxygen. If he needs oxygen at d/c, they are agreeable to using [facility]. CCP to follow for needs. DEJON Crandall               Discharge Codes    No documentation.               Expected Discharge Date and Time     Expected Discharge Date Expected Discharge Time    Jan 6, 2022             DEJON BENAVIDES

## 2022-01-06 NOTE — PLAN OF CARE
Goal Outcome Evaluation:  Plan of Care Reviewed With: patient        Progress: no change  Outcome Summary: Has slept long intervals. Remains on Airvo 60/100 using NRB mask prn with activity. Temp max 101.3 tylenol given with good results. Other vss.

## 2022-01-06 NOTE — PROGRESS NOTES
" LOS: 4 days     Chief Complaint: Covid pneumonia    Interval History: Patient reports he is in a better mood this morning.  Reports stable oxygen requirements and no worsening of his respiratory symptoms.  Reports he is still having a cough but largely clearish production.    He remains on continued high flow oxygen at the same settings as yesterday however no worsening.  Slight increase in inflammatory markers today however still improved overall.  Tolerating antibiotics well.    Vital Signs  Temp:  [98.9 °F (37.2 °C)-101.3 °F (38.5 °C)] 100.3 °F (37.9 °C)  Heart Rate:  [53-70] 61  Resp:  [18] 18  BP: (120-165)/(78-90) 132/89    Physical Exam:  General: In no acute distress  HEENT: Oropharynx clear, moist mucous membranes  Cardiovascular: RRR, normal S1 and S2, no M/R/G  Respiratory: Clear to ascultation bilaterally, no wheezing.  No increased respiratory effort  GI: Soft, NT/ND, + bowel sounds bilaterally, no masses  Skin: No rashes or lesions  Extremities: No edema, cyanosis.  Right upper extremity with casting in place.  Access: Peripheral IV    Antibiotics:  Anti-Infectives (From admission, onward)    Ordered     Dose/Rate Route Frequency Start Stop    01/04/22 1105  cefepime 2 gm IVPB in 100 ml NS (VTB)        Ordering Provider: Tigre Ward, DO    2 g  over 4 Hours Intravenous Every 8 Hours 01/04/22 2000 01/09/22 2359    01/04/22 1105  cefepime 2 gm IVPB in 100 ml NS (VTB)        Ordering Provider: Tigre Ward,     2 g  over 30 Minutes Intravenous Once 01/04/22 1200 01/04/22 1156    01/02/22 2236  remdesivir 100 mg in sodium chloride 0.9 % 270 mL IVPB (powder vial)        Ordering Provider: Renaldo Cooper APRN   \"Followed by\" Linked Group Details    100 mg  over 60 Minutes Intravenous Every 24 Hours 01/03/22 2237 01/07/22 2237    01/02/22 2236  remdesivir 200 mg in sodium chloride 0.9 % 290 mL IVPB (powder vial)        Ordering Provider: Renaldo Cooper APRN   \"Followed by\" " Linked Group Details    200 mg  over 60 Minutes Intravenous Every 24 Hours 01/02/22 2238 01/03/22 0359    01/02/22 2150  cefTRIAXone (ROCEPHIN) 1 g in sodium chloride 0.9 % 100 mL IVPB-VTB        Ordering Provider: Krishna Hebert MD    1 g  200 mL/hr over 30 Minutes Intravenous Once 01/02/22 2152 01/02/22 2323           Results Review:     I reviewed the patient's new clinical results.    Lab Results   Component Value Date    WBC 11.05 (H) 01/06/2022    HGB 12.7 (L) 01/06/2022    HCT 38.3 01/06/2022    MCV 83.4 01/06/2022     01/06/2022     Lab Results   Component Value Date    GLUCOSE 106 (H) 01/06/2022    BUN 21 (H) 01/06/2022    CREATININE 0.90 01/06/2022    EGFRIFNONA 87 01/06/2022    BCR 23.3 01/06/2022    CO2 21.5 (L) 01/06/2022    CALCIUM 8.2 (L) 01/06/2022    ALBUMIN 2.70 (L) 01/06/2022    AST 46 (H) 01/06/2022    ALT 47 (H) 01/06/2022       Microbiology:  12/31 Covid positive  1/2 blood culture in process  1/2 Covid positive  1/4 respiratory culture normal jana  1/5 MRSA screen negative     Radiology:  12/31 chest x-ray with bilateral patchy infiltrates consistent with pneumonia.     1/2 chest x-ray with bilateral patchy infiltrates     1/3 CT angiogram of the chest without pulmonary emboli and findings of diffuse bilateral patchy groundglass opacities    Assessment/Plan   #Acute hypoxic respiratory failure in the setting of bilateral Covid pneumonia  #Suspected concomitant bacterial pneumonia  #History of JOSHUA complicating the above     Continuing IV remdesivir for 5 days (final day today) and dexamethasone 6 mg twice daily. Follow daily LFTs, CRP creatinine daily. Continue with supportive care    Continue IV cefepime 2 g every 8 hours for empiric bacterial pneumonia coverage given elevated procalcitonin and only normal respiratory jana on sputu, culture. MRSA nares negative. Would plan for minimum 7 days of therapy with antibiotics given his prolonged response and will plan on rechecking  procalcitonin tomorrow.      ID will follow.

## 2022-01-07 NOTE — PROGRESS NOTES
[facility] Medicine Services  PROGRESS NOTE    Patient Name: Renaldo Rodriguez  : 1965  MRN: [id number]    Date of Admission: 2022  Primary Care Physician: Provider, No Known    Subjective   Subjective     CC:  Follow-up Covid pneumonia    HPI:  Patient states he feels perhaps a little bit better today.  He is still weak.  Still short of breath.  Appetite appears to be improving.      Review of Systems  No current headache or lightheadedness  No current nausea, vomiting, or diarrhea  No current chest pain or palpitations    Objective   Objective     Vital Signs:   Temp:  [98.9 °F (37.2 °C)-99.6 °F (37.6 °C)] 98.9 °F (37.2 °C)  Heart Rate:  [60-73] 64  Resp:  [18-22] 22  BP: (119-135)/(75-85) 135/85        Physical Exam:  Constitutional:Awake, alert, ill-appearing  HENT: NCAT, mucous membranes moist, neck supple  Respiratory: Persistent coarse respiratory sounds with occasional wheeze, less cough is noted, breathing is labored, mild tachypneic, currently on high oxygen needs Optiflow  Cardiovascular: RRR, normal radial pulses  Gastrointestinal: Positive bowel sounds, soft, nontender, nondistended  Musculoskeletal: Significantly obese, BMI is 36, minimal lower extremity edema  Psychiatric: Calm affect, cooperative, conversational  Neurologic: No slurred speech or facial droop, follows commands  Skin: No rashes or jaundice, warm      Results Reviewed:  Results from last 7 days   Lab Units 22  1018 22  0623 22  0908 22  0207 22  2019   WBC 10*3/mm3 11.05* 11.04* 10.41   < > 10.20   HEMOGLOBIN g/dL 12.7* 11.9* 12.2*   < > 14.2   HEMATOCRIT % 38.3 35.5* 37.5   < > 40.9   PLATELETS 10*3/mm3 429 447 407   < > 329   PROCALCITONIN ng/mL  --   --  5.86*  --  2.38*    < > = values in this interval not displayed.     Results from last 7 days   Lab Units 22  1018 22  0623 22  0908 22  1340 0122   SODIUM  mmol/L 138 138 134*  --    < > 135*   < > 136   POTASSIUM mmol/L 4.4 4.0 3.7  --    < > 3.8   < > 3.9   CHLORIDE mmol/L 105 103 102  --    < > 100   < > 96*   CO2 mmol/L 21.5* 21.6* 21.8*  --    < > 22.2   < > 19.8*   BUN mg/dL 21* 23* 25*  --    < > 17   < > 15   CREATININE mg/dL 0.90 0.92 0.80  --    < > 1.18   < > 1.17   GLUCOSE mg/dL 106* 141* 147*  --    < > 133*   < > 128*   CALCIUM mg/dL 8.2* 8.4* 8.5*  --    < > 8.4*   < > 8.6   ALT (SGPT) U/L 47* 44* 35  --    < > 38   < > 46*  46*   AST (SGOT) U/L 46* 50* 47*  --    < > 54*   < > 72*  69*   TROPONIN T ng/mL  --   --  <0.010 <0.010  --  <0.010   < > <0.010   PROBNP pg/mL  --   --   --   --   --   --   --  355.9    < > = values in this interval not displayed.     Estimated Creatinine Clearance: 109 mL/min (by C-G formula based on SCr of 0.9 mg/dL).    Microbiology Results Abnormal     Procedure Component Value - Date/Time    Blood Culture - Blood, Arm, Left [[id number]]  (Normal) Collected: 01/02/22 2027    Lab Status: Preliminary result Specimen: Blood from Arm, Left Updated: 01/06/22 2045     Blood Culture No growth at 4 days    Respiratory Culture - Sputum, Cough [[id number]] Collected: 01/04/22 1552    Lab Status: Final result Specimen: Sputum from Cough Updated: 01/06/22 0656     Respiratory Culture Light growth (2+) Normal respiratory jana. No S. aureus or Pseudomonas aeruginosa detected. Final report.     Gram Stain Few (2+) WBCs per low power field      Moderate (3+) Mixed bacterial jana      Few (2+) Epithelial cells per low power field    MRSA Screen, PCR (Inpatient) - Swab, Nares [[id number]]  (Normal) Collected: 01/05/22 1435    Lab Status: Final result Specimen: Swab from Nares Updated: 01/05/22 1614     MRSA PCR No MRSA Detected          Imaging Results (Last 24 Hours)     ** No results found for the last 24 hours. **              I have reviewed the medications:  Scheduled Meds:cefepime, 2 g, Intravenous, Q8H  cholecalciferol, 1,000 Units,  Oral, Daily  dexamethasone, 6 mg, Oral, Q12H  enoxaparin, 40 mg, Subcutaneous, Q12H  famotidine, 20 mg, Oral, Daily  pravastatin, 10 mg, Oral, Nightly      Continuous Infusions:   PRN Meds:.•  acetaminophen  •  melatonin  •  nitroglycerin  •  ondansetron **OR** ondansetron  •  sodium chloride    Assessment/Plan   Assessment & Plan     Active Hospital Problems    Diagnosis  POA   • **Pneumonia due to COVID-19 virus [U07.1, J12.82]  Yes   • Hyperlipidemia, elevated LDL [E78.41]  Yes   • Lymphocytopenia [D72.810]  Yes   • Acute transaminitis due to COVID-19 viral infection [B17.9]  Yes   • Class 2 severe obesity with serious comorbidity in adult (HCC) [E66.01]  Yes   • JOSHUA (obstructive sleep apnea) [G47.33]  Yes   • Bacterial pneumonia [J15.9]  Yes   • Abnormal EKG [R94.31]  Yes   • Acute respiratory failure with hypoxia and hypercapnia (HCC) [J96.01, J96.02]  Unknown   • Elevated liver enzymes [R74.8]  Yes   • Neuritis or radiculitis due to rupture of lumbar intervertebral disc [M51.16]  Yes      Resolved Hospital Problems    Diagnosis Date Resolved POA   • Metabolic acidosis [E87.2] 01/03/2022 Yes        Brief Hospital Course to date:  Renaldo Rodriguez is a 56 y.o. male presents to the hospital with COVID-19 pneumonia and secondary bacterial pneumonia.  Patient had been attempting to treat himself at home with equine agricultural ivermectin.    Discussion/plan for today:  Patient is still on high OptiFlow settings.  Attempts to wean have been unsuccessful so far.  Plan to attempt trials to wean again today.  Plan to recheck procalcitonin.  Considering repeat CT angiogram noted for pulmonology.  Repeat x-ray images reviewed.  Infiltrates more prominent.  Patient remains severely ill on high oxygen needs but oxygen needs plateaued at current level.  Continuing antibacterial, antiviral, and steroid therapy and wean oxygen as tolerated.  Repeat EKG images reviewed and changes felt to be related to ivermectin-normalized,  particularly ST depression noted in lateral leads on admission now resolved.         COVID-19 pneumonia with secondary respiratory failure, lymphocytopenia, transaminitis and bacterial pneumonia:  Aggressive treatment for COVID-19 with dexamethasone and remdesivir.  Encourage self proning, advance oxygen to OptiFlow.  CODE STATUS updated patient now wishes to be full code and is okay with intubation.  Lymphocytopenia is common with COVID-19.  Monitor blood counts.  Transaminitis likely related to Covid associated viral hepatitis.  Trend.  Supportive care and symptom treatment.  Flutter valve and incentive spirometer.  Cefepime treatment for bacterial pneumonia including pseudomonal coverage, MRSA nasal PCR negative.  Trend inflammatory markers.  CT angiogram negative for pulmonary embolus.  GI prophylaxis with H2 blocker.    Obstructive sleep apnea: Further complicates issue.  Supplemental oxygen and pulse oximetry monitoring.  Patient has never been on CPAP.    Abnormal EKG, adverse reaction to ivermectin, hyperlipidemia:  EKG abnormal at admission but no ACS.  Likely ivermectin associated EKG changes.  Repeat EKG 1/4.  Recommend outpatient cardiology evaluation.  LDL notably at 106.  Start low-dose pravastatin, there is some evidence statin may be helpful with Covid infection.  ST depression in lateral leads resolved with discontinuing ivermectin.    Obesity:  Further complicates illness.  Supportive care and symptom treatment.  Eventual weight loss recommended.    History of lumbar degenerative disc disease and neuropathic pain:  No longer taking gabapentin.  Tylenol as needed for now.    Metabolic acidosis: Noted on admission.  Monitor, improved..    Mixed hyperlipidemia: Continue pravastatin    DVT Prophylaxis: Lovenox    Disposition: Pending course    CODE STATUS:   Code Status and Medical Interventions:   Ordered at: 01/04/22 4955     Level Of Support Discussed With:    Patient     Code Status (Patient has no  pulse and is not breathing):    CPR (Attempt to Resuscitate)     Medical Interventions (Patient has pulse or is breathing):    Full Support       John Maher MD  01/07/22

## 2022-01-07 NOTE — PROGRESS NOTES
"      [facility]         Dr Cary Sayied   LOS: 5 days   Patient Care Team:  Provider, No Known as PCP - General    Chief Complaint: Acute hypoxemic respiratory failure with COVID-19 pneumonia elevated dimer with CT angiogram negative for PE JOSHUA intolerant to CPAP    Interval History: Remains on heated high flow 60 L 100% plus nonrebreather.  Maintaining sats above 90%.  Shortness of breath and desaturation with minimal activity. This morning had an episode in which he desaturated but recovered. Reports some anxiety this morning.    REVIEW OF SYSTEMS:   CARDIOVASCULAR: No chest pain, chest pressure or chest discomfort. No palpitations or edema.   RESPIRATORY: Short of breath with activity  GASTROINTESTINAL: No anorexia, nausea, vomiting or diarrhea. No abdominal pain or blood.   HEMATOLOGIC: No bleeding or bruising.     Ventilator/Non-Invasive Ventilation Settings (From admission, onward)            None            Vital Signs  Temp:  [98.9 °F (37.2 °C)-99.6 °F (37.6 °C)] 98.9 °F (37.2 °C)  Heart Rate:  [60-73] 65  Resp:  [18-24] 24  BP: (119-135)/(75-85) 135/85    Intake/Output Summary (Last 24 hours) at 1/7/2022 1317  Last data filed at 1/7/2022 1302  Gross per 24 hour   Intake 1690 ml   Output 1060 ml   Net 630 ml     Flowsheet Rows      First Filed Value   Admission Height 175 cm (68.9\") Documented at 01/02/2022 2025   Admission Weight 111 kg (244 lb 11.4 oz) Documented at 01/02/2022 2025          Physical Exam:  Patient is examined using the personal protective equipment as per guidelines from infection control for this particular patient as enacted.  Hand hygiene was performed before and after patient interaction.   General Appearance:    Alert, cooperative, in no acute distress.  Following simple commands  Neck midline trachea, no thyromegaly   Lungs:    Diminished breath sounds with rhonchi bilaterally on the basis    Heart:    Regular rhythm and normal rate, normal S1 and S2, no            " murmur, no gallop, no rub, no click   Chest Wall:    No abnormalities observed   Abdomen:     Normal bowel sounds, no masses, no organomegaly, soft        nontender, nondistended, no guarding, no rebound                tenderness   Extremities:   Moves all extremities well, no edema, no cyanosis, no             redness  CNS no focal neurological deficits normal sensory exam  Skin no rashes no nodules  Musculoskeletal no cyanosis no clubbing normal range of motion     Results Review:        Results from last 7 days   Lab Units 01/07/22  0755 01/06/22  1018 01/06/22  1018 01/05/22  0623 01/05/22 0623   SODIUM mmol/L 137  --  138  --  138   POTASSIUM mmol/L 4.4  --  4.4  --  4.0   CHLORIDE mmol/L 105  --  105  --  103   CO2 mmol/L 21.8*  --  21.5*  --  21.6*   BUN mg/dL 20  --  21*  --  23*   CREATININE mg/dL 0.72*  --  0.90  --  0.92   GLUCOSE mg/dL 115*   < > 106*   < > 141*   CALCIUM mg/dL 7.9*  --  8.2*  --  8.4*    < > = values in this interval not displayed.     Results from last 7 days   Lab Units 01/07/22  0755 01/06/22  1018 01/05/22 0623 01/04/22  0908 01/04/22  0908 01/03/22  1340 01/03/22  0207 01/02/22 2019   CK TOTAL U/L 230* 350* 572*   < > 844*  --   --    < >   TROPONIN T ng/mL  --   --   --   --  <0.010 <0.010 <0.010  --     < > = values in this interval not displayed.     Results from last 7 days   Lab Units 01/07/22  0755 01/06/22  1018 01/05/22 0623   WBC 10*3/mm3 11.09* 11.05* 11.04*   HEMOGLOBIN g/dL 11.9* 12.7* 11.9*   HEMATOCRIT % 35.0* 38.3 35.5*   PLATELETS 10*3/mm3 418 429 447         Results from last 7 days   Lab Units 01/04/22  0908   CHOLESTEROL mg/dL 157         Results from last 7 days   Lab Units 01/04/22  0908   CHOLESTEROL mg/dL 157   TRIGLYCERIDES mg/dL 140   HDL CHOL mg/dL 26*   LDL CHOL mg/dL 106*           I reviewed the patient's new clinical results.  I personally viewed and interpreted the patient's chest x-ray.        Medication Review:   cefepime, 2 g, Intravenous,  Q8H  cholecalciferol, 1,000 Units, Oral, Daily  dexamethasone, 6 mg, Oral, Q12H  enoxaparin, 40 mg, Subcutaneous, Q12H  famotidine, 20 mg, Oral, Daily  pravastatin, 10 mg, Oral, Nightly             ASSESSMENT:   Acute hypoxemic respiratory failure  COVID-19 pneumonia  Elevated D-dimer  JOSHUA  Obesity      PLAN:  Oxygen requirement significantly high and still requiring significant amount of oxygen 60 L 100% heated high flow plus in between 15 L nonrebreather.  Will attempt to wean to keep sats above 90%.  D-dimer not significantly elevated and worsening oxygen status. I will repeat CT angiogram. He is already on high-dose DVT prophylaxis but may need therapeutic Lovenox  Agree with current management with Decadron and remdesivir.  Appreciate input from ID further antibiotic management per ID  Intolerant to CPAP  Mobilize and ambulate if able to  Continue to monitor clinical course closely.  High risk for decompensation and possibly requiring noninvasive ventilation versus intubation  I will transfer him close monitoring to ICU.    Critical care time 35 minutes      Raeann Whitehead MD  01/07/22  13:17 EST

## 2022-01-07 NOTE — CONSULTS
"Adult Nutrition  Assessment/PES    Patient Name:  Renaldo Rodriguez  YOB: 1965  MRN: [id number]  Admit Date:  1/2/2022    Assessment Date:  1/7/2022    Comments: MST = 2. Pt reported his appetite has been good since admission, and that it has improved since admission. 75% average intake x 5 meals. He said he has not noticed any recent weight changes. Will continue to monitor and make recommendations as needed.     Reason for Assessment     Row Name 01/07/22 0840          Reason for Assessment    Reason For Assessment identified at risk by screening criteria     Diagnosis other (see comments)  Admit w/ PNA due to COVID, bulging disc of backbone, acute resp failure, elevated liver enzymes, lympocytes decerased, acute transaminitis, class 2 severe obesity, sleep apnea, PNA, abnormal EKG, HLD, elevated LDL     Identified At Risk by Screening Criteria MST SCORE 2+                Nutrition/Diet History     Row Name 01/07/22 0842          Nutrition/Diet History    Typical Food/Fluid Intake Reported appetite has improved since admission.                Anthropometrics     Row Name 01/07/22 0843          Anthropometrics    Height 175 cm (68.9\")     Weight 107 kg (235 lb 14.3 oz)  not weighed by RD            Ideal Body Weight (IBW)    Ideal Body Weight (IBW) (kg) 73.4     % Ideal Body Weight 145.78            Body Mass Index (BMI)    BMI (kg/m2) 35.01     BMI Assessment BMI 35-39.9: obesity grade II                Labs/Tests/Procedures/Meds     Row Name 01/07/22 0844          Labs/Procedures/Meds    Lab Results Reviewed reviewed     Lab Results Comments Glucose (high), BUN (high), ALT (high), Albumin (low), C-reactive Protein (high), Procalcitonin (high)            Diagnostic Tests/Procedures    Diagnostic Test/Procedure Reviewed reviewed     Diagnostic Test/Procedures Comments XR chest            Medications    Pertinent Medications Reviewed reviewed     Pertinent Medications Comments cefepime, vitamin D3, " "decadron, lovenox, pepcid, pravastatin, PRN: sodium chloride                  Estimated/Assessed Needs     Row Name 01/07/22 0843          Calculation Measurements    Height 175 cm (68.9\")                Nutrition Prescription Ordered     Row Name 01/07/22 0845          Nutrition Prescription PO    Current PO Diet Regular     Common Modifiers Cardiac                Evaluation of Received Nutrient/Fluid Intake     Row Name 01/07/22 0845          PO Evaluation    % PO Intake 75% average x 5 meals                     Problem/Interventions:   Problem 1     Row Name 01/07/22 0846          Nutrition Diagnoses Problem 1    Problem 1 Overweight/Obesity     Etiology (related to) Factors Affecting Nutrition     Food Habit/Preferences Other  caloric intake > caloric needs     Signs/Symptoms (evidenced by) BMI     BMI 30 - 34.9                      Intervention Goal     Row Name 01/07/22 0848          Intervention Goal    General Maintain nutrition; Provide information regarding MNT for treatment/condition     PO Maintain intake; Continue positive trend; Meet estimated needs     Weight Appropriate weight loss                Nutrition Intervention     Row Name 01/07/22 0849          Nutrition Intervention    RD/Tech Action Follow Tx progress; Care plan reviewd                  Education/Evaluation     Row Name 01/07/22 0849          Education    Education Will Instruct as appropriate            Monitor/Evaluation    Monitor Per protocol; PO intake; Weight; Symptoms                 Electronically signed by:  Deyanira Carrillo RD  01/07/22 08:49 EST  "

## 2022-01-07 NOTE — PLAN OF CARE
Problem: Breathing Pattern Ineffective  Goal: Effective Breathing Pattern  Outcome: Ongoing, Not Progressing     Problem: Adult Inpatient Plan of Care  Goal: Plan of Care Review  Outcome: Ongoing, Progressing  Flowsheets (Taken 1/7/2022 0428)  Outcome Summary: Tmax 99.6. 0300 O2 saturation 84%. saturation increased to 92-94% after deep breathing.  Goal: Patient-Specific Goal (Individualized)  Outcome: Ongoing, Progressing  Goal: Absence of Hospital-Acquired Illness or Injury  Outcome: Ongoing, Progressing  Intervention: Identify and Manage Fall Risk  Recent Flowsheet Documentation  Taken 1/7/2022 0000 by Jennifer Charles RN  Safety Promotion/Fall Prevention:   safety round/check completed   room organization consistent   nonskid shoes/slippers when out of bed   lighting adjusted   clutter free environment maintained   assistive device/personal items within reach   activity supervised  Taken 1/6/2022 2340 by Jennifer Charles RN  Safety Promotion/Fall Prevention:   safety round/check completed   room organization consistent   nonskid shoes/slippers when out of bed   lighting adjusted   clutter free environment maintained   assistive device/personal items within reach   activity supervised  Taken 1/6/2022 2200 by Jennifer Charles RN  Safety Promotion/Fall Prevention:   safety round/check completed   room organization consistent   nonskid shoes/slippers when out of bed   lighting adjusted   fall prevention program maintained   activity supervised   clutter free environment maintained   assistive device/personal items within reach  Taken 1/6/2022 2035 by Jennifer Charles, RN  Safety Promotion/Fall Prevention:   activity supervised   assistive device/personal items within reach   clutter free environment maintained   fall prevention program maintained   gait belt   lighting adjusted   nonskid shoes/slippers when out of bed   room organization consistent   safety round/check completed  Intervention: Prevent Skin  Injury  Recent Flowsheet Documentation  Taken 1/6/2022 2340 by Jennifer Charles RN  Body Position: position changed independently  Taken 1/6/2022 2035 by Jennifer Charles RN  Body Position: position changed independently  Skin Protection:   adhesive use limited   tubing/devices free from skin contact   incontinence pads utilized   transparent dressing maintained   skin-to-skin areas padded  Intervention: Prevent and Manage VTE (venous thromboembolism) Risk  Recent Flowsheet Documentation  Taken 1/6/2022 2035 by Jennifer Charles RN  VTE Prevention/Management:   bilateral   dorsiflexion/plantar flexion performed  Intervention: Prevent Infection  Recent Flowsheet Documentation  Taken 1/7/2022 0000 by Jennifer Charles RN  Infection Prevention:   environmental surveillance performed   equipment surfaces disinfected   hand hygiene promoted   rest/sleep promoted   single patient room provided   visitors restricted/screened   personal protective equipment utilized  Taken 1/6/2022 2340 by Jennifer Charles RN  Infection Prevention:   environmental surveillance performed   equipment surfaces disinfected   hand hygiene promoted   personal protective equipment utilized   rest/sleep promoted   single patient room provided   visitors restricted/screened  Taken 1/6/2022 2200 by Jennifer Charles RN  Infection Prevention:   environmental surveillance performed   equipment surfaces disinfected   hand hygiene promoted   personal protective equipment utilized   rest/sleep promoted   single patient room provided   visitors restricted/screened  Taken 1/6/2022 2035 by Jennifer Charles RN  Infection Prevention:   visitors restricted/screened   single patient room provided   rest/sleep promoted   personal protective equipment utilized   hand hygiene promoted   equipment surfaces disinfected   environmental surveillance performed  Goal: Optimal Comfort and Wellbeing  Outcome: Ongoing, Progressing  Intervention: Provide Person-Centered Care  Recent  Flowsheet Documentation  Taken 1/6/2022 2035 by Jennifer Charles RN  Trust Relationship/Rapport:   care explained   choices provided   emotional support provided   empathic listening provided   questions answered   questions encouraged   reassurance provided   thoughts/feelings acknowledged  Goal: Readiness for Transition of Care  Outcome: Ongoing, Progressing     Problem: Fall Injury Risk  Goal: Absence of Fall and Fall-Related Injury  Outcome: Ongoing, Progressing  Intervention: Identify and Manage Contributors to Fall Injury Risk  Recent Flowsheet Documentation  Taken 1/6/2022 2035 by Jennifer Charles RN  Medication Review/Management: medications reviewed  Intervention: Promote Injury-Free Environment  Recent Flowsheet Documentation  Taken 1/7/2022 0000 by Jennifer Charles RN  Safety Promotion/Fall Prevention:   safety round/check completed   room organization consistent   nonskid shoes/slippers when out of bed   lighting adjusted   clutter free environment maintained   assistive device/personal items within reach   activity supervised  Taken 1/6/2022 2340 by Jennifer Charles RN  Safety Promotion/Fall Prevention:   safety round/check completed   room organization consistent   nonskid shoes/slippers when out of bed   lighting adjusted   clutter free environment maintained   assistive device/personal items within reach   activity supervised  Taken 1/6/2022 2200 by Jennifer Charles RN  Safety Promotion/Fall Prevention:   safety round/check completed   room organization consistent   nonskid shoes/slippers when out of bed   lighting adjusted   fall prevention program maintained   activity supervised   clutter free environment maintained   assistive device/personal items within reach  Taken 1/6/2022 2035 by Jennifer Charles RN  Safety Promotion/Fall Prevention:   activity supervised   assistive device/personal items within reach   clutter free environment maintained   fall prevention program maintained   gait belt   lighting  adjusted   nonskid shoes/slippers when out of bed   room organization consistent   safety round/check completed   Goal Outcome Evaluation:              Outcome Summary: Tmax 99.6. 0300 O2 saturation 84%. saturation increased to 92-94% after deep breathing.

## 2022-01-07 NOTE — PROGRESS NOTES
D Dimer more elevated.  Repeat CTA noted, Pulm to transfer to ICU.  Pro-aaron better.    Electronically signed by John Maher MD, 01/07/22, 1:23 PM EST.

## 2022-01-07 NOTE — PAYOR COMM NOTE
"Cecil Conde (56 y.o. Male)    PLEASE SEE ATTACHED.     REF#[id number]    PLEASE CALL [phone] OR  [phone] WITH INPT CONTINUED STAY REVIEW/    THANK YOU    SHANTE JOHNSTON LPN CCP[             Date of Birth Social Security Number Address Home Phone MRN    1965  [id number] [address] [facility] [phone] [id number]    Episcopalian Marital Status             Rastafari        Admission Date Admission Type Admitting Provider Attending Provider Department, Room/Bed    1/2/22 Emergency John Maher MD Furlow, Stephen Matthew, MD [address], [address]    Discharge Date Discharge Disposition Discharge Destination                         Attending Provider: John Maher MD    Allergies: No Known Allergies    Isolation: Enh Drop/Con   Infection: COVID (confirmed) (01/01/22)   Code Status: CPR   Advance Care Planning Activity    Ht: 175 cm (68.9\")   Wt: 107 kg (235 lb 14.3 oz)    Admission Cmt: None   Principal Problem: Pneumonia due to COVID-19 virus [U07.1,J12.82]                 Active Insurance as of 1/2/2022     Primary Coverage     Payor Plan Insurance Group Employer/Plan Group    AET[employer] COMMERCIAL AETNA [id number]     Payor Plan Address Payor Plan Phone Number Payor Plan Fax Number Effective Dates    [address] [phone]  12/31/2021 - None Entered    [facility] [address]       Subscriber Name Subscriber Birth Date Member ID       CECIL CONDE 1965 [id number]                 Emergency Contacts      (Rel.) Home Phone Work Phone Mobile Phone    Ban Conde (Spouse) [phone] -- --    Samson Conde (Brother) -- -- [phone]            Oxygen Therapy (last day)     Date/Time SpO2 Device (Oxygen Therapy) Flow (L/min) Oxygen Concentration (%) ETCO2 (mmHg)    01/07/22 1055 95 heated; humidified; high-flow nasal cannula; nonrebreather mask 60 100 --    01/07/22 0734 92 heated; high-flow nasal cannula " -- -- --    01/07/22 0700 91 humidified; heated; high-flow nasal cannula 60 100 --    01/07/22 0410 -- heated; high-flow nasal cannula 60 100 --    01/07/22 0005 -- heated; high-flow nasal cannula 60 100 --    01/06/22 2340 94 heated; high-flow nasal cannula -- -- --    01/06/22 2100 -- heated; high-flow nasal cannula 60 100 --    01/06/22 2037 90 heated; humidified; high-flow nasal cannula 60 -- --    01/06/22 2035 -- humidified; heated; high-flow nasal cannula 60 -- --    01/06/22 1500 93 heated; humidified; high-flow nasal cannula 60 100 --    01/06/22 1402 92 heated; high-flow nasal cannula -- -- --    01/06/22 0918 93 humidified; heated; high-flow nasal cannula 60 100 --    01/06/22 0915 -- humidified; heated; high-flow nasal cannula 60 100 --    01/06/22 0750 92 -- 60 100 --    01/06/22 0737 91 heated; high-flow nasal cannula -- -- --    01/06/22 0341 93 heated; high-flow nasal cannula; humidified -- -- --    01/06/22 0300 95 high-flow nasal cannula; heated; humidified 60 100 --    01/06/22 0000 92 high-flow nasal cannula; humidified; heated 60 100 --          Intake & Output (last day)       01/06 0701 01/07 0700 01/07 0701  01/08 0700    P.O. 1340 240    IV Piggyback 370     Total Intake(mL/kg) 1710 (16) 240 (2.2)    Urine (mL/kg/hr) 1030 (0.4) 430 (0.9)    Stool  0    Total Output 1030 430    Net +680 -190          Stool Unmeasured Occurrence  1 x        Lines, Drains & Airways     Active LDAs     Name Placement date Placement time Site Days    Peripheral IV 01/07/22 0110 Left; Upper Arm 01/07/22  0110  Arm  less than 1                  Medication Administration Report for Renaldo Rodriguez as of 01/07/22 1139   Legend:    Given Hold Not Given Due Canceled Entry Other Actions    Time Time (Time) Time  Time-Action       Discontinued     Completed     Future     MAR Hold     Linked           Medications 01/05/22 01/06/22 01/07/22    acetaminophen (TYLENOL) tablet 650 mg  Dose: 650 mg  Freq: Every 4 Hours PRN  Route: PO  PRN Reasons: Mild Pain ,Fever  Start: 01/02/22 2235   Admin Instructions:   Do not exceed 4 grams of acetaminophen in a 24 hr period.    If given for pain, use the following pain scale:   Mild Pain = Pain Score of 1-3, CPOT 1-2  Moderate Pain = Pain Score of 4-6, CPOT 3-4  Severe Pain = Pain Score of 7-10, CPOT 5-8  Do not exceed 4 grams of acetaminophen in a 24 hr period. Max dose of 2gm for AST/ALT greater than 120 units/L      If given for pain, use the following pain scale:   Mild Pain = Pain Score of 1-3, CPOT 1-2  Moderate Pain = Pain Score of 4-6, CPOT 3-4  Severe Pain = Pain Score of 7-10, CPOT 5-8       1533-Given     2156-Given              cefepime 2 gm IVPB in 100 ml NS (VTB)  Dose: 2 g  Freq: Every 8 Hours Route: IV  Indications of Use: PNEUMONIA  Start: 01/04/22 2000   End: 01/09/22 2359       0814-New Bag     1529-New Bag     2317-New Bag           0916-New Bag     1759-New Bag     2150-Stopped           0103-New Bag     0931-New Bag     1600           cholecalciferol (VITAMIN D3) tablet 1,000 Units  Dose: 1,000 Units  Freq: Daily Route: PO  Start: 01/03/22 1000       0853-Given             0916-Given             0932-Given             dexamethasone (DECADRON) tablet 6 mg  Dose: 6 mg  Freq: Every 12 Hours Scheduled Route: PO  Start: 01/03/22 0900   Admin Instructions:   Take with food.       0814-Given     2151-Given            0916-Given     2042-Given            0932-Given     2100            enoxaparin (LOVENOX) syringe 40 mg  Dose: 40 mg  Freq: Every 12 Hours Route: SC  Indications of Use: PROPHYLAXIS OF VENOUS THROMBOEMBOLISM  Start: 01/05/22 1500   Admin Instructions:   Give subcutaneous in abdomen only. Do not massage site after injection.       1528-Given             0408-Given     1759-Given            0336-Given     1545            famotidine (PEPCID) tablet 20 mg  Dose: 20 mg  Freq: Daily Route: PO  Start: 01/03/22 0900       0814-Given             0916-Given              0932-Given             melatonin tablet 3 mg  Dose: 3 mg  Freq: Nightly PRN Route: PO  PRN Reason: Sleep  Start: 01/02/22 2235          nitroglycerin (NITROSTAT) SL tablet 0.4 mg  Dose: 0.4 mg  Freq: Every 5 Minutes PRN Route: SL  PRN Reason: Chest Pain  PRN Comment: Only if SBP Greater Than 100  Start: 01/02/22 2235   Admin Instructions:   If Pain Unrelieved After 3 Doses Notify MD          ondansetron (ZOFRAN) tablet 4 mg  Dose: 4 mg  Freq: Every 4 Hours PRN Route: PO  PRN Reasons: Nausea,Vomiting  Start: 01/02/22 2235         Or  ondansetron (ZOFRAN) injection 4 mg  Dose: 4 mg  Freq: Every 4 Hours PRN Route: IV  PRN Reasons: Nausea,Vomiting  Start: 01/02/22 2235          pravastatin (PRAVACHOL) tablet 10 mg  Dose: 10 mg  Freq: Nightly Route: PO  Start: 01/04/22 2100   Admin Instructions:   Avoid grapefruit juice.       2151-Given             2044-Given             2100             sodium chloride 0.9 % flush 10 mL  Dose: 10 mL  Freq: As Needed Route: IV  PRN Reason: Line Care  Start: 01/02/22 2015 0814-Given              Completed Medications  Medications 01/05/22 01/06/22 01/07/22       cefepime 2 gm IVPB in 100 ml NS (VTB)  Dose: 2 g  Freq: Once Route: IV  Start: 01/04/22 1200   End: 01/04/22 1156          cefTRIAXone (ROCEPHIN) 1 g in sodium chloride 0.9 % 100 mL IVPB-VTB  Dose: 1 g  Freq: Once Route: IV  Indications of Use: PNEUMONIA  Start: 01/02/22 2152   End: 01/02/22 2323   Admin Instructions:   Caution: Look alike/sound alike drug alert          dexamethasone (DECADRON) injection 6 mg  Dose: 6 mg  Freq: Once Route: IV  Start: 01/02/22 2034   End: 01/02/22 2049   Admin Instructions:   For IV administration.  May be pushed over a minimum of 1 minute.          iopamidol (ISOVUE-370) 76 % injection 100 mL  Dose: 100 mL  Freq: Once in Imaging Route: IV  Start: 01/03/22 0654   End: 01/03/22 0700          remdesivir 200 mg in sodium chloride 0.9 % 290 mL IVPB (powder vial)  Dose: 200 mg  Freq: Every 24  Hours Route: IV  Start: 01/02/22 2238   End: 01/03/22 0359   Admin Instructions:   Ensure all of the drug is administered. Flush line with 30mL NS after administration.   Order specific questions:   Days Since Symptom Onset: </= 6 Days           Followed by  remdesivir 100 mg in sodium chloride 0.9 % 270 mL IVPB (powder vial)  Dose: 100 mg  Freq: Every 24 Hours Route: IV  Start: 01/03/22 2237   End: 01/06/22 2322   Admin Instructions:   Ensure all of the drug is administered. Flush line with 30mL NS after administration.       2151-New Bag             2212-New Bag     2322-Stopped            Discontinued Medications  Medications 01/05/22 01/06/22 01/07/22       cefTRIAXone (ROCEPHIN) 1 g in sodium chloride 0.9 % 100 mL IVPB-VTB  Dose: 1 g  Freq: Every 24 Hours Route: IV  Indications of Use: PNEUMONIA  Start: 01/03/22 2100   End: 01/04/22 1105   Admin Instructions:   Caution: Look alike/sound alike drug alert          dexamethasone (DECADRON) tablet 6 mg  Dose: 6 mg  Freq: Daily With Breakfast Route: PO  Start: 01/03/22 0800   End: 01/03/22 0608   Admin Instructions:   Take with food.          enoxaparin (LOVENOX) syringe 40 mg  Dose: 40 mg  Freq: Every 24 Hours Route: SC  Indications of Use: PROPHYLAXIS OF VENOUS THROMBOEMBOLISM  Start: 01/02/22 2330   End: 01/05/22 1454   Admin Instructions:   Give subcutaneous in abdomen only. Do not massage site after injection.          Pharmacy Consult - Remdesivir  Freq: Continuous PRN Route: XX  PRN Reason: Consult  Start: 01/02/22 2236   End: 01/03/22 0819   Order specific questions:   Days Since Symptom Onset: </= 6 Days            Pharmacy to Dose enoxaparin (LOVENOX)  Freq: Continuous PRN Route: XX  PRN Reason: Consult  Indications Comment: Prophylaxis of Venous Thromboembolism  Start: 01/02/22 2235   End: 01/03/22 0818   Order specific questions:   Indication of use Prophylaxis                   Operative/Procedure Notes (last 24 hours)  Notes from 01/06/22 1139 through  01/07/22 1139   No notes of this type exist for this encounter.            Physician Progress Notes (last 24 hours)      Tigre Ward, DO at 01/07/22 0927           LOS: 5 days     Chief Complaint: Covid pneumonia    Interval History: Patient states he has had episodes of hypoxia today with removing his nonrebreather mask to attempt to eat.  Reports he has a good appetite.  Denies any fevers or chills.  States his shortness of breath feels slightly better however he has some concern given his oxygen monitor alarming.    He remains on continued high flow oxygen at the same settings as yesterday however no worsening.  Overall inflammatory markers remain largely stable however slight decrease today.  Tolerating antibiotics well.  Pro-Alon markedly improved today at 0.49.    Vital Signs  Temp:  [98.9 °F (37.2 °C)-99.6 °F (37.6 °C)] 98.9 °F (37.2 °C)  Heart Rate:  [60-73] 64  Resp:  [18-22] 22  BP: (119-135)/(75-85) 135/85    Physical Exam:  General: In no acute distress  HEENT: Oropharynx clear, moist mucous membranes  Cardiovascular: RRR, normal S1 and S2, no M/R/G  Respiratory: Right lower lobe rhonchi, no wheezing.  No increased respiratory effort on high flow nasal cannula  GI: Soft, NT/ND, + bowel sounds bilaterally, no masses  Skin: No rashes or lesions  Extremities: No edema, cyanosis.  Right upper extremity with casting in place.  Access: Peripheral IV    Antibiotics:  Anti-Infectives (From admission, onward)    Ordered     Dose/Rate Route Frequency Start Stop    01/04/22 1105  cefepime 2 gm IVPB in 100 ml NS (VTB)        Ordering Provider: Tigre Ward, DO    2 g  over 4 Hours Intravenous Every 8 Hours 01/04/22 2000 01/09/22 2359    01/04/22 1105  cefepime 2 gm IVPB in 100 ml NS (VTB)        Ordering Provider: Tigre Ward, DO    2 g  over 30 Minutes Intravenous Once 01/04/22 1200 01/04/22 1156    01/02/22 2236  remdesivir 100 mg in sodium chloride 0.9 % 270 mL IVPB (powder vial)       "  Ordering Provider: Renaldo Cooper APRN   \"Followed by\" Linked Group Details    100 mg  over 60 Minutes Intravenous Every 24 Hours 01/03/22 2237 01/06/22 2322 01/02/22 2236  remdesivir 200 mg in sodium chloride 0.9 % 290 mL IVPB (powder vial)        Ordering Provider: Renaldo Cooper APRN   \"Followed by\" Linked Group Details    200 mg  over 60 Minutes Intravenous Every 24 Hours 01/02/22 2238 01/03/22 0359 01/02/22 2150  cefTRIAXone (ROCEPHIN) 1 g in sodium chloride 0.9 % 100 mL IVPB-VTB        Ordering Provider: Krishna Hebert MD    1 g  200 mL/hr over 30 Minutes Intravenous Once 01/02/22 2152 01/02/22 2323           Results Review:     I reviewed the patient's new clinical results.    Lab Results   Component Value Date    WBC 11.09 (H) 01/07/2022    HGB 11.9 (L) 01/07/2022    HCT 35.0 (L) 01/07/2022    MCV 81.0 01/07/2022     01/07/2022     Lab Results   Component Value Date    GLUCOSE 115 (H) 01/07/2022    BUN 20 01/07/2022    CREATININE 0.72 (L) 01/07/2022    EGFRIFNONA 113 01/07/2022    BCR 27.8 (H) 01/07/2022    CO2 21.8 (L) 01/07/2022    CALCIUM 7.9 (L) 01/07/2022    ALBUMIN 2.80 (L) 01/07/2022    AST 36 01/07/2022    ALT 42 (H) 01/07/2022       Microbiology:  12/31 Covid positive  1/2 blood culture in process  1/2 Covid positive  1/4 respiratory culture normal jana  1/5 MRSA screen negative     New imaging:  No new imaging today    Assessment/Plan   #Acute hypoxic respiratory failure in the setting of bilateral Covid pneumonia  #Suspected concomitant bacterial pneumonia  #History of JOSHUA complicating the above      He remains stable on his oxygen requirement with no worsening hypoxia however no improvement either.  He remains on likely maximal targeted therapy as he is received 5 days total of remdesivir (completed) and is on dexamethasone 6 mg twice daily.  We will need to continue to monitor inflammatory markers daily. Continue with supportive care. He remains unable to " receive IL-6 inhibitor due to concomitant bacterial pneumonia.     Continue IV cefepime 2 g every 8 hours for empiric bacterial pneumonia coverage given elevated procalcitonin (now markedly improved). Would plan for minimum 7 days of therapy with antibiotics through 1/10.          Electronically signed by Tigre Ward DO at 22 0933     John Maher MD at 22 0836              [facility] Medicine Services  PROGRESS NOTE    Patient Name: Renaldo Rodriguez  : 1965  MRN: [id number]    Date of Admission: 2022  Primary Care Physician: Provider, No Known    Subjective   Subjective     CC:  Follow-up Covid pneumonia    HPI:  Patient states he feels perhaps a little bit better today.  He is still weak.  Still short of breath.  Appetite appears to be improving.      Review of Systems  No current headache or lightheadedness  No current nausea, vomiting, or diarrhea  No current chest pain or palpitations    Objective   Objective     Vital Signs:   Temp:  [98.9 °F (37.2 °C)-99.6 °F (37.6 °C)] 98.9 °F (37.2 °C)  Heart Rate:  [60-73] 64  Resp:  [18-22] 22  BP: (119-135)/(75-85) 135/85        Physical Exam:  Constitutional:Awake, alert, ill-appearing  HENT: NCAT, mucous membranes moist, neck supple  Respiratory: Persistent coarse respiratory sounds with occasional wheeze, less cough is noted, breathing is labored, mild tachypneic, currently on high oxygen needs Optiflow  Cardiovascular: RRR, normal radial pulses  Gastrointestinal: Positive bowel sounds, soft, nontender, nondistended  Musculoskeletal: Significantly obese, BMI is 36, minimal lower extremity edema  Psychiatric: Calm affect, cooperative, conversational  Neurologic: No slurred speech or facial droop, follows commands  Skin: No rashes or jaundice, warm      Results Reviewed:  Results from last 7 days   Lab Units 22  1018 22  0623 22  0908 22  0207 22   WBC 10*3/mm3 11.05* 11.04* 10.41   < >  10.20   HEMOGLOBIN g/dL 12.7* 11.9* 12.2*   < > 14.2   HEMATOCRIT % 38.3 35.5* 37.5   < > 40.9   PLATELETS 10*3/mm3 429 447 407   < > 329   PROCALCITONIN ng/mL  --   --  5.86*  --  2.38*    < > = values in this interval not displayed.     Results from last 7 days   Lab Units 01/06/22  1018 01/05/22  0623 01/04/22  0908 01/03/22  1340 01/03/22  0207 01/03/22  0207 01/02/22 2019 01/02/22 2019   SODIUM mmol/L 138 138 134*  --    < > 135*   < > 136   POTASSIUM mmol/L 4.4 4.0 3.7  --    < > 3.8   < > 3.9   CHLORIDE mmol/L 105 103 102  --    < > 100   < > 96*   CO2 mmol/L 21.5* 21.6* 21.8*  --    < > 22.2   < > 19.8*   BUN mg/dL 21* 23* 25*  --    < > 17   < > 15   CREATININE mg/dL 0.90 0.92 0.80  --    < > 1.18   < > 1.17   GLUCOSE mg/dL 106* 141* 147*  --    < > 133*   < > 128*   CALCIUM mg/dL 8.2* 8.4* 8.5*  --    < > 8.4*   < > 8.6   ALT (SGPT) U/L 47* 44* 35  --    < > 38   < > 46*  46*   AST (SGOT) U/L 46* 50* 47*  --    < > 54*   < > 72*  69*   TROPONIN T ng/mL  --   --  <0.010 <0.010  --  <0.010   < > <0.010   PROBNP pg/mL  --   --   --   --   --   --   --  355.9    < > = values in this interval not displayed.     Estimated Creatinine Clearance: 109 mL/min (by C-G formula based on SCr of 0.9 mg/dL).    Microbiology Results Abnormal     Procedure Component Value - Date/Time    Blood Culture - Blood, Arm, Left [[id number]]  (Normal) Collected: 01/02/22 2027    Lab Status: Preliminary result Specimen: Blood from Arm, Left Updated: 01/06/22 2045     Blood Culture No growth at 4 days    Respiratory Culture - Sputum, Cough [[id number]] Collected: 01/04/22 1552    Lab Status: Final result Specimen: Sputum from Cough Updated: 01/06/22 0656     Respiratory Culture Light growth (2+) Normal respiratory jana. No S. aureus or Pseudomonas aeruginosa detected. Final report.     Gram Stain Few (2+) WBCs per low power field      Moderate (3+) Mixed bacterial jana      Few (2+) Epithelial cells per low power field    MRSA  Screen, PCR (Inpatient) - Swab, Nares [[id number]]  (Normal) Collected: 01/05/22 1435    Lab Status: Final result Specimen: Swab from Nares Updated: 01/05/22 1614     MRSA PCR No MRSA Detected          Imaging Results (Last 24 Hours)     ** No results found for the last 24 hours. **              I have reviewed the medications:  Scheduled Meds:cefepime, 2 g, Intravenous, Q8H  cholecalciferol, 1,000 Units, Oral, Daily  dexamethasone, 6 mg, Oral, Q12H  enoxaparin, 40 mg, Subcutaneous, Q12H  famotidine, 20 mg, Oral, Daily  pravastatin, 10 mg, Oral, Nightly      Continuous Infusions:   PRN Meds:.•  acetaminophen  •  melatonin  •  nitroglycerin  •  ondansetron **OR** ondansetron  •  sodium chloride    Assessment/Plan   Assessment & Plan     Active Hospital Problems    Diagnosis  POA   • **Pneumonia due to COVID-19 virus [U07.1, J12.82]  Yes   • Hyperlipidemia, elevated LDL [E78.41]  Yes   • Lymphocytopenia [D72.810]  Yes   • Acute transaminitis due to COVID-19 viral infection [B17.9]  Yes   • Class 2 severe obesity with serious comorbidity in adult (HCC) [E66.01]  Yes   • JOSHUA (obstructive sleep apnea) [G47.33]  Yes   • Bacterial pneumonia [J15.9]  Yes   • Abnormal EKG [R94.31]  Yes   • Acute respiratory failure with hypoxia and hypercapnia (HCC) [J96.01, J96.02]  Unknown   • Elevated liver enzymes [R74.8]  Yes   • Neuritis or radiculitis due to rupture of lumbar intervertebral disc [M51.16]  Yes      Resolved Hospital Problems    Diagnosis Date Resolved POA   • Metabolic acidosis [E87.2] 01/03/2022 Yes        Brief Hospital Course to date:  Renaldo Rodriguez is a 56 y.o. male presents to the hospital with COVID-19 pneumonia and secondary bacterial pneumonia.  Patient had been attempting to treat himself at home with equine agricultural ivermectin.    Discussion/plan for today:  Patient is still on high OptiFlow settings.  Attempts to wean have been unsuccessful so far.  Plan to attempt trials to wean again today.  Plan to  recheck procalcitonin.  Considering repeat CT angiogram noted for pulmonology.  Repeat x-ray images reviewed.  Infiltrates more prominent.  Patient remains severely ill on high oxygen needs but oxygen needs plateaued at current level.  Continuing antibacterial, antiviral, and steroid therapy and wean oxygen as tolerated.  Repeat EKG images reviewed and changes felt to be related to ivermectin-normalized, particularly ST depression noted in lateral leads on admission now resolved.         COVID-19 pneumonia with secondary respiratory failure, lymphocytopenia, transaminitis and bacterial pneumonia:  Aggressive treatment for COVID-19 with dexamethasone and remdesivir.  Encourage self proning, advance oxygen to OptiFlow.  CODE STATUS updated patient now wishes to be full code and is okay with intubation.  Lymphocytopenia is common with COVID-19.  Monitor blood counts.  Transaminitis likely related to Covid associated viral hepatitis.  Trend.  Supportive care and symptom treatment.  Flutter valve and incentive spirometer.  Cefepime treatment for bacterial pneumonia including pseudomonal coverage, MRSA nasal PCR negative.  Trend inflammatory markers.  CT angiogram negative for pulmonary embolus.  GI prophylaxis with H2 blocker.    Obstructive sleep apnea: Further complicates issue.  Supplemental oxygen and pulse oximetry monitoring.  Patient has never been on CPAP.    Abnormal EKG, adverse reaction to ivermectin, hyperlipidemia:  EKG abnormal at admission but no ACS.  Likely ivermectin associated EKG changes.  Repeat EKG 1/4.  Recommend outpatient cardiology evaluation.  LDL notably at 106.  Start low-dose pravastatin, there is some evidence statin may be helpful with Covid infection.  ST depression in lateral leads resolved with discontinuing ivermectin.    Obesity:  Further complicates illness.  Supportive care and symptom treatment.  Eventual weight loss recommended.    History of lumbar degenerative disc disease and  "neuropathic pain:  No longer taking gabapentin.  Tylenol as needed for now.    Metabolic acidosis: Noted on admission.  Monitor, improved..    Mixed hyperlipidemia: Continue pravastatin    DVT Prophylaxis: Lovenox    Disposition: Pending course    CODE STATUS:   Code Status and Medical Interventions:   Ordered at: 01/04/22 1929     Level Of Support Discussed With:    Patient     Code Status (Patient has no pulse and is not breathing):    CPR (Attempt to Resuscitate)     Medical Interventions (Patient has pulse or is breathing):    Full Support       John Maher MD  01/07/22      Electronically signed by John Maher MD at 01/07/22 0834     Raeann Whitehead MD at 01/06/22 1502                [facility]         Dr Raeann Whitehead   LOS: 4 days   Patient Care Team:  Provider, No Known as PCP - General    Chief Complaint: Acute hypoxemic respiratory failure with COVID-19 pneumonia elevated dimer with CT angiogram negative for PE JOSHUA intolerant to CPAP    Interval History: Remains on heated high flow 6 L 100%.  Maintaining sats above 90%.  Shortness of breath and desaturation with minimal activity    REVIEW OF SYSTEMS:   CARDIOVASCULAR: No chest pain, chest pressure or chest discomfort. No palpitations or edema.   RESPIRATORY: Short of breath with activity  GASTROINTESTINAL: No anorexia, nausea, vomiting or diarrhea. No abdominal pain or blood.   HEMATOLOGIC: No bleeding or bruising.     Ventilator/Non-Invasive Ventilation Settings (From admission, onward)            None            Vital Signs  Temp:  [98.9 °F (37.2 °C)-101.3 °F (38.5 °C)] 99.5 °F (37.5 °C)  Heart Rate:  [53-73] 73  Resp:  [18] 18  BP: (119-134)/(75-90) 119/75    Intake/Output Summary (Last 24 hours) at 1/6/2022 1502  Last data filed at 1/6/2022 1400  Gross per 24 hour   Intake 740 ml   Output 1000 ml   Net -260 ml     Flowsheet Rows      First Filed Value   Admission Height 175 cm (68.9\") Documented at 01/02/2022 " 2025   Admission Weight 111 kg (244 lb 11.4 oz) Documented at 01/02/2022 2025          Physical Exam:  Patient is examined using the personal protective equipment as per guidelines from infection control for this particular patient as enacted.  Hand hygiene was performed before and after patient interaction.   General Appearance:    Alert, cooperative, in no acute distress.  Following simple commands  Neck midline trachea, no thyromegaly   Lungs:    Diminished breath sounds with rhonchi bilaterally on the basis    Heart:    Regular rhythm and normal rate, normal S1 and S2, no            murmur, no gallop, no rub, no click   Chest Wall:    No abnormalities observed   Abdomen:     Normal bowel sounds, no masses, no organomegaly, soft        nontender, nondistended, no guarding, no rebound                tenderness   Extremities:   Moves all extremities well, no edema, no cyanosis, no             redness  CNS no focal neurological deficits normal sensory exam  Skin no rashes no nodules  Musculoskeletal no cyanosis no clubbing normal range of motion     Results Review:        Results from last 7 days   Lab Units 01/06/22 1018 01/05/22 0623 01/05/22 0623 01/04/22  0908 01/04/22  0908   SODIUM mmol/L 138  --  138  --  134*   POTASSIUM mmol/L 4.4  --  4.0  --  3.7   CHLORIDE mmol/L 105  --  103  --  102   CO2 mmol/L 21.5*  --  21.6*  --  21.8*   BUN mg/dL 21*  --  23*  --  25*   CREATININE mg/dL 0.90  --  0.92  --  0.80   GLUCOSE mg/dL 106*   < > 141*   < > 147*   CALCIUM mg/dL 8.2*  --  8.4*  --  8.5*    < > = values in this interval not displayed.     Results from last 7 days   Lab Units 01/06/22  1018 01/05/22  0623 01/04/22  0908 01/03/22  1340 01/03/22  0207 01/02/22  2019   CK TOTAL U/L 350* 572* 844*  --   --    < >   TROPONIN T ng/mL  --   --  <0.010 <0.010 <0.010  --     < > = values in this interval not displayed.     Results from last 7 days   Lab Units 01/06/22 1018 01/05/22 0623 01/04/22  0908   WBC  10*3/mm3 11.05* 11.04* 10.41   HEMOGLOBIN g/dL 12.7* 11.9* 12.2*   HEMATOCRIT % 38.3 35.5* 37.5   PLATELETS 10*3/mm3 429 447 407         Results from last 7 days   Lab Units 01/04/22  0908   CHOLESTEROL mg/dL 157         Results from last 7 days   Lab Units 01/04/22  0908   CHOLESTEROL mg/dL 157   TRIGLYCERIDES mg/dL 140   HDL CHOL mg/dL 26*   LDL CHOL mg/dL 106*           I reviewed the patient's new clinical results.  I personally viewed and interpreted the patient's chest x-ray.        Medication Review:   cefepime, 2 g, Intravenous, Q8H  cholecalciferol, 1,000 Units, Oral, Daily  dexamethasone, 6 mg, Oral, Q12H  enoxaparin, 40 mg, Subcutaneous, Q12H  famotidine, 20 mg, Oral, Daily  pravastatin, 10 mg, Oral, Nightly  remdesivir, 100 mg, Intravenous, Q24H             ASSESSMENT:   Acute hypoxemic respiratory failure  COVID-19 pneumonia  Elevated D-dimer  JOSHUA  Obesity      PLAN:  Oxygen requirement stable but still requiring significant amount of oxygen 60 L 100% heated high flow.  Will attempt to wean to keep sats above 90%.  D-dimer was rising but CT angiogram was negative recently.  Patient remains on DVT high-dose prophylaxis.  If no improvement in oxygenation may need to repeat CT angiogram in the next 24 to 48 hours  Agree with current management with Decadron and remdesivir.  Appreciate input from ID further antibiotic management per ID  Intolerant to CPAP  Mobilize and ambulate  Continue to monitor clinical course closely.  High risk for decompensation and possibly requiring noninvasive ventilation versus intubation      Raeann Whitehead MD  01/06/22  15:02 EST            Electronically signed by Raeann Whitehead MD at 01/06/22 9204     Tigre Ward DO at 01/06/22 1317           LOS: 4 days     Chief Complaint: Covid pneumonia    Interval History: Patient reports he is in a better mood this morning.  Reports stable oxygen requirements and no worsening of his respiratory symptoms.  Reports he is still  "having a cough but largely clearish production.    He remains on continued high flow oxygen at the same settings as yesterday however no worsening.  Slight increase in inflammatory markers today however still improved overall.  Tolerating antibiotics well.    Vital Signs  Temp:  [98.9 °F (37.2 °C)-101.3 °F (38.5 °C)] 100.3 °F (37.9 °C)  Heart Rate:  [53-70] 61  Resp:  [18] 18  BP: (120-165)/(78-90) 132/89    Physical Exam:  General: In no acute distress  HEENT: Oropharynx clear, moist mucous membranes  Cardiovascular: RRR, normal S1 and S2, no M/R/G  Respiratory: Clear to ascultation bilaterally, no wheezing.  No increased respiratory effort  GI: Soft, NT/ND, + bowel sounds bilaterally, no masses  Skin: No rashes or lesions  Extremities: No edema, cyanosis.  Right upper extremity with casting in place.  Access: Peripheral IV    Antibiotics:  Anti-Infectives (From admission, onward)    Ordered     Dose/Rate Route Frequency Start Stop    01/04/22 1105  cefepime 2 gm IVPB in 100 ml NS (VTB)        Ordering Provider: Tigre Ward, DO    2 g  over 4 Hours Intravenous Every 8 Hours 01/04/22 2000 01/09/22 2359    01/04/22 1105  cefepime 2 gm IVPB in 100 ml NS (VTB)        Ordering Provider: Tigre Ward, DO    2 g  over 30 Minutes Intravenous Once 01/04/22 1200 01/04/22 1156    01/02/22 2236  remdesivir 100 mg in sodium chloride 0.9 % 270 mL IVPB (powder vial)        Ordering Provider: Renaldo Cooper APRN   \"Followed by\" Linked Group Details    100 mg  over 60 Minutes Intravenous Every 24 Hours 01/03/22 2237 01/07/22 2237    01/02/22 2236  remdesivir 200 mg in sodium chloride 0.9 % 290 mL IVPB (powder vial)        Ordering Provider: Renaldo Cooper APRN   \"Followed by\" Linked Group Details    200 mg  over 60 Minutes Intravenous Every 24 Hours 01/02/22 2238 01/03/22 0359    01/02/22 2150  cefTRIAXone (ROCEPHIN) 1 g in sodium chloride 0.9 % 100 mL IVPB-VTB        Ordering Provider: Krishna Hebert " MD Noe    1 g  200 mL/hr over 30 Minutes Intravenous Once 01/02/22 2152 01/02/22 7249           Results Review:     I reviewed the patient's new clinical results.    Lab Results   Component Value Date    WBC 11.05 (H) 01/06/2022    HGB 12.7 (L) 01/06/2022    HCT 38.3 01/06/2022    MCV 83.4 01/06/2022     01/06/2022     Lab Results   Component Value Date    GLUCOSE 106 (H) 01/06/2022    BUN 21 (H) 01/06/2022    CREATININE 0.90 01/06/2022    EGFRIFNONA 87 01/06/2022    BCR 23.3 01/06/2022    CO2 21.5 (L) 01/06/2022    CALCIUM 8.2 (L) 01/06/2022    ALBUMIN 2.70 (L) 01/06/2022    AST 46 (H) 01/06/2022    ALT 47 (H) 01/06/2022       Microbiology:  12/31 Covid positive  1/2 blood culture in process  1/2 Covid positive  1/4 respiratory culture normal jana  1/5 MRSA screen negative     Radiology:  12/31 chest x-ray with bilateral patchy infiltrates consistent with pneumonia.     1/2 chest x-ray with bilateral patchy infiltrates     1/3 CT angiogram of the chest without pulmonary emboli and findings of diffuse bilateral patchy groundglass opacities    Assessment/Plan   #Acute hypoxic respiratory failure in the setting of bilateral Covid pneumonia  #Suspected concomitant bacterial pneumonia  #History of JOSHUA complicating the above     Continuing IV remdesivir for 5 days (final day today) and dexamethasone 6 mg twice daily. Follow daily LFTs, CRP creatinine daily. Continue with supportive care    Continue IV cefepime 2 g every 8 hours for empiric bacterial pneumonia coverage given elevated procalcitonin and only normal respiratory jana on sputu, culture. MRSA nares negative. Would plan for minimum 7 days of therapy with antibiotics given his prolonged response and will plan on rechecking procalcitonin tomorrow.      ID will follow.       Electronically signed by Tigre Ward DO at 01/06/22 3808

## 2022-01-07 NOTE — PROGRESS NOTES
" LOS: 5 days     Chief Complaint: Covid pneumonia    Interval History: Patient states he has had episodes of hypoxia today with removing his nonrebreather mask to attempt to eat.  Reports he has a good appetite.  Denies any fevers or chills.  States his shortness of breath feels slightly better however he has some concern given his oxygen monitor alarming.    He remains on continued high flow oxygen at the same settings as yesterday however no worsening.  Overall inflammatory markers remain largely stable however slight decrease today.  Tolerating antibiotics well.  Pro-Alon markedly improved today at 0.49.    Vital Signs  Temp:  [98.9 °F (37.2 °C)-99.6 °F (37.6 °C)] 98.9 °F (37.2 °C)  Heart Rate:  [60-73] 64  Resp:  [18-22] 22  BP: (119-135)/(75-85) 135/85    Physical Exam:  General: In no acute distress  HEENT: Oropharynx clear, moist mucous membranes  Cardiovascular: RRR, normal S1 and S2, no M/R/G  Respiratory: Right lower lobe rhonchi, no wheezing.  No increased respiratory effort on high flow nasal cannula  GI: Soft, NT/ND, + bowel sounds bilaterally, no masses  Skin: No rashes or lesions  Extremities: No edema, cyanosis.  Right upper extremity with casting in place.  Access: Peripheral IV    Antibiotics:  Anti-Infectives (From admission, onward)    Ordered     Dose/Rate Route Frequency Start Stop    01/04/22 1105  cefepime 2 gm IVPB in 100 ml NS (VTB)        Ordering Provider: Tigre Ward, DO    2 g  over 4 Hours Intravenous Every 8 Hours 01/04/22 2000 01/09/22 2359    01/04/22 1105  cefepime 2 gm IVPB in 100 ml NS (VTB)        Ordering Provider: Tigre Ward, DO    2 g  over 30 Minutes Intravenous Once 01/04/22 1200 01/04/22 1156    01/02/22 2236  remdesivir 100 mg in sodium chloride 0.9 % 270 mL IVPB (powder vial)        Ordering Provider: Renaldo Cooper APRN   \"Followed by\" Linked Group Details    100 mg  over 60 Minutes Intravenous Every 24 Hours 01/03/22 2237 01/06/22 2322    " "01/02/22 2236  remdesivir 200 mg in sodium chloride 0.9 % 290 mL IVPB (powder vial)        Ordering Provider: Renaldo Cooper APRN   \"Followed by\" Linked Group Details    200 mg  over 60 Minutes Intravenous Every 24 Hours 01/02/22 2238 01/03/22 0359    01/02/22 2150  cefTRIAXone (ROCEPHIN) 1 g in sodium chloride 0.9 % 100 mL IVPB-VTB        Ordering Provider: Krishna Hebert MD    1 g  200 mL/hr over 30 Minutes Intravenous Once 01/02/22 2152 01/02/22 2323           Results Review:     I reviewed the patient's new clinical results.    Lab Results   Component Value Date    WBC 11.09 (H) 01/07/2022    HGB 11.9 (L) 01/07/2022    HCT 35.0 (L) 01/07/2022    MCV 81.0 01/07/2022     01/07/2022     Lab Results   Component Value Date    GLUCOSE 115 (H) 01/07/2022    BUN 20 01/07/2022    CREATININE 0.72 (L) 01/07/2022    EGFRIFNONA 113 01/07/2022    BCR 27.8 (H) 01/07/2022    CO2 21.8 (L) 01/07/2022    CALCIUM 7.9 (L) 01/07/2022    ALBUMIN 2.80 (L) 01/07/2022    AST 36 01/07/2022    ALT 42 (H) 01/07/2022       Microbiology:  12/31 Covid positive  1/2 blood culture in process  1/2 Covid positive  1/4 respiratory culture normal jana  1/5 MRSA screen negative     New imaging:  No new imaging today    Assessment/Plan   #Acute hypoxic respiratory failure in the setting of bilateral Covid pneumonia  #Suspected concomitant bacterial pneumonia  #History of JOSHUA complicating the above      He remains stable on his oxygen requirement with no worsening hypoxia however no improvement either.  He remains on likely maximal targeted therapy as he is received 5 days total of remdesivir (completed) and is on dexamethasone 6 mg twice daily.  We will need to continue to monitor inflammatory markers daily. Continue with supportive care. He remains unable to receive IL-6 inhibitor due to concomitant bacterial pneumonia.     Continue IV cefepime 2 g every 8 hours for empiric bacterial pneumonia coverage given elevated " procalcitonin (now markedly improved). Would plan for minimum 7 days of therapy with antibiotics through 1/10.

## 2022-01-08 NOTE — PROGRESS NOTES
Patient transferred to ICU.  Discussed with Dr. Whitehead earlier.  Currently sats about 90% or higher.  Tolerating OptiFlow with nonrebreather.  Note plans for CTA with D-dimer above 20.  Will change Lovenox to full dose.  Morning team to assume care.  Duplex also requested.

## 2022-01-08 NOTE — PROGRESS NOTES
Pernell Alcantar MD                          [phone]      Patient ID:    Name:  Renaldo Rodriguez    MRN:  [id number]    1965   56 y.o.  male            Patient Care Team:  Provider, No Known as PCP - General    CC/ Reason for visit: Acute respiratory failure, acute COVID-19 pneumonia, unvaccinated patient, DVT/PE    Subjective: Pt seen and examined this AM. Acute overnight events noted. Events during CAT scan yesterday noted. Patient on significant support. Sats in the mid 80s -noninvasive ventilator ordered. Patient already on full anticoagulation. No bleeding concerns. No new fevers. Tolerating antibiotics and steroids.    ROS: Unable to obtain more than about due to severe respiratory failure    Objective     Vital Signs past 24hrs    BP range: BP: (118-149)/() 149/93  Pulse range: Heart Rate:  [55-78] 67  Resp rate range: Resp:  [22-24] 22  Temp range: Temp (24hrs), Av.1 °F (37.3 °C), Min:97.4 °F (36.3 °C), Max:99.9 °F (37.7 °C)      Ventilator/Non-Invasive Ventilation Settings (From admission, onward)            None          Device (Oxygen Therapy): high-flow nasal cannula; humidified; heated; nonrebreather mask       108 kg (238 lb 8.6 oz); Body mass index is 35.33 kg/m².      Intake/Output Summary (Last 24 hours) at 2022 1034  Last data filed at 2022 0900  Gross per 24 hour   Intake 850 ml   Output 2130 ml   Net -1280 ml       PHYSICAL EXAM   Constitutional: Middle aged pt in bed, morbidly obese white male in mild respiratory distress while on significant oxygen support  Head: - NCAT  Eyes: No pallor.  Anicteric sclerae, EOMI.  ENMT:  Mallampati 4, no oral thrush. Moist MM.   NECK: Trachea midline, No thyromegaly, no palpable cervical lymphadenopathy  Heart: RRR, no murmur. Trace bilateral pedal edema   Lungs: RENATO +, decreased breath sounds at the bases full no wheezes/ crackles heard    Abdomen: Soft. Obese. No tenderness, guarding or rigidity.  No palpable masses  Extremities: Extremities warm and well perfused. No cyanosis/ clubbing  Neuro: Conscious, answers appropriately, no gross focal neuro deficits  Psych: Mood and affect -anxious    PPE recommended per [facility] infectious disease Isolation protocol for the current clinical scenario(as mentioned below) was followed.     Scheduled meds:  cefepime, 2 g, Intravenous, Q8H  cholecalciferol, 1,000 Units, Oral, Daily  dexamethasone, 6 mg, Oral, Q12H  enoxaparin, 1 mg/kg, Subcutaneous, Q12H  famotidine, 20 mg, Oral, Daily  pravastatin, 10 mg, Oral, Nightly  sodium chloride, 10 mL, Intravenous, Q12H        IV meds:                      Pharmacy to Dose enoxaparin (LOVENOX),         Data Review:      Results from last 7 days   Lab Units 01/08/22  0638 01/07/22  0755 01/06/22  1018 01/05/22  0623 01/04/22  0908 01/03/22  0207 01/02/22 2019   SODIUM mmol/L 137 137 138   < > 134*   < > 136   POTASSIUM mmol/L 4.5 4.4 4.4   < > 3.7   < > 3.9   CHLORIDE mmol/L 104 105 105   < > 102   < > 96*   CO2 mmol/L 21.9* 21.8* 21.5*   < > 21.8*   < > 19.8*   BUN mg/dL 22* 20 21*   < > 25*   < > 15   CREATININE mg/dL 0.76 0.72* 0.90   < > 0.80   < > 1.17   CALCIUM mg/dL 8.1* 7.9* 8.2*   < > 8.5*   < > 8.6   BILIRUBIN mg/dL 0.8 0.6 0.7   < > 0.4   < > 0.8  0.8   ALK PHOS U/L 78 68 62   < > 59   < > 76  74   ALT (SGPT) U/L 45* 42* 47*   < > 35   < > 46*  46*   AST (SGOT) U/L 38 36 46*   < > 47*   < > 72*  69*   GLUCOSE mg/dL 120* 115* 106*   < > 147*   < > 128*   WBC 10*3/mm3 11.79* 11.09* 11.05*   < > 10.41   < > 10.20   HEMOGLOBIN g/dL 12.7* 11.9* 12.7*   < > 12.2*   < > 14.2   PLATELETS 10*3/mm3 360 418 429   < > 407   < > 329   PROBNP pg/mL  --   --   --   --   --   --  355.9   PROCALCITONIN ng/mL  --  0.49*  --   --  5.86*  --  2.38*    < > = values in this interval not displayed.       Lab Results   Component Value Date    CALCIUM 8.1 (L) 01/08/2022    PHOS 3.8 01/08/2022       Results from last 7 days   Lab  Units 01/04/22  1552 01/02/22 2027   BLOODCX   --  No growth at 5 days   RESPCX  Light growth (2+) Normal respiratory jana. No S. aureus or Pseudomonas aeruginosa detected. Final report.  --        COVID LABS:  Results From Last 14 Days   Lab Units 01/08/22  0638 01/07/22  0755 01/06/22  1018 01/05/22  0623 01/05/22  0623 01/04/22  0908 01/04/22  0908 01/03/22  1340 01/03/22  0207 01/02/22 2019 01/02/22 2019   PROBNP pg/mL  --   --   --   --   --   --   --   --   --   --  355.9   CK TOTAL U/L 160 230* 350*   < > 572*   < > 844*  --   --    < > 1,229*   CRP mg/dL 8.52* 7.35* 8.13*   < > 5.83*   < > 12.47*  --   --    < > 26.49*  25.13*   D DIMER QUANT MCGFEU/mL  --  >20.00*  --   --  3.20*  --   --   --  2.26*   < > 2.50*   FERRITIN ng/mL 2,391.00* 1,778.00* 1,802.00*   < > 1,697.00*   < > 2,938.00*  --   --    < > 3,621.00*   LACTATE mmol/L  --   --   --   --   --   --   --   --  1.6  --   --    LDH U/L  --  665*  --   --  585*  --   --   --   --   --  849*   PROCALCITONIN ng/mL  --  0.49*  --   --   --   --  5.86*  --   --   --  2.38*   TROPONIN T ng/mL  --   --   --   --   --   --  <0.010 <0.010 <0.010   < > <0.010   TRIGLYCERIDES mg/dL  --   --   --   --   --   --  140  --   --   --   --     < > = values in this interval not displayed.            Results Review:    I have reviewed the relevant laboratory results and independently reviewed the chest imaging from this hospitalization including the available echocardiogram reports personally and summarized it if/ when appropriate below    Assessment    Acute hypoxic respiratory failure s/p HHFNC/ NRB  Noninvasive ventilator use  Acute COVID-19 pneumonia - unvaccinated patient  Acute pulmonary embolism on full AC  Secondary bacterial pneumonia on antibiotics  Cytokine release syndrome s/p remdesivir  Previous biceps surgery  JOSHUA - Noncompliant with PAP  Morbid obesity    PLAN:  Work-up done so far as well as recommendations from prior intensivist noted.  Overnight events noted. Patient transferred to ICU due to progressive decline with regards to respiratory failure currently with sats in the mid 80s while on 100% heated high flow nasal cannula and nonrebreather. Will use noninvasive ventilator now.  Noted new diagnosis of acute pulmonary embolism and patient already on full anticoagulation. Will discontinue lower extremity venous duplex as it will not .  Continue with high-dose steroids but will increase dose.   Patient already on antibiotic course for secondary pneumonia and tolerating it well  Previous biceps surgery noted and no acute indication for intervention at this point  We will use as needed diuretics to keep him as negative as possible  Full code    DVT/gi prophylaxis    Very guarded prognosis. High risk for need for intubation mechanical ventilation. Unfortunately unvaccinated patient with multiple comorbidities. Discussed the same with the patient and he understands.    D/w RN/ RT    CC - 40 mins    Pernell Alcantar MD  1/8/2022

## 2022-01-08 NOTE — PLAN OF CARE
Problem: Adult Inpatient Plan of Care  Goal: Plan of Care Review  Outcome: Ongoing, Progressing   Goal Outcome Evaluation:      Received from floor with dx covid pna on nrb and hi palmira. Placed on opti-palmira 60/100. Sat's 85-93%. No distress. To ct for r/o pulm emboli. Traveled on hi palmira n/c and nrb. Both on 100% O2. Did not tolerate layong flat or switching of O2 delivery for travel. While on CT table sat's dropped to mid 50's. Skin cool and clammy. Did not lose normal mentation. Sat's increased to mid 80's with upright position and hi 80's-low 90's when returned to room and placed back on opt-palmira 60/100. Radiologist called to report CT showed pulmonary emboli. Dr. Solares had increased dose of lovenox earlier in shift. Information given to Dr. Solares with NNO's.

## 2022-01-08 NOTE — PLAN OF CARE
Anesthesia Pre Eval Note    OB Evaluation    Anesthesia ROS/Med Hx        Anesthetic Complication History:  Patient does not have a history of anesthetic complications      Pulmonary Review:  History of: asthma - well controlled  The patient is a smoker. (currently vapes)    Neuro/Psych Review:    Positive for psychiatric history - Depression    Cardiovascular Review:    Positive for hyperlipidemia    GI/HEPATIC/RENAL Review:    Positive for GERD - well controlled  Positive for renal disease    End/Other Review:    Positive for substance abuse (hx of currently on subutex ) - opioids      Relevant Problems   No relevant active problems       Physical Exam     Airway   Mallampati: II  TM Distance: >3 FB  Neck ROM: Full  TMJ Mobility: Good    Cardiovascular  Cardiovascular exam normal    Head Assessment  Head assessment: Normocephalic and Atraumatic    General Assessment  General Assessment: Alert and oriented and No acute distress    Dental Exam  Dental exam normal    Pulmonary Exam  Pulmonary exam normal    Abdominal Exam    Patient Demonstrates:  Gravid      Anesthesia Plan    ASA Status: 2  Anesthesia Type: Spinal      Checklist  Reviewed: Patient Summary, Allergies, Past Med History, Medications, Beta Blocker Status, NPO Status, Problem list and Lab Results    Informed Consent  The proposed anesthetic plan, including its risks and benefits, have been discussed with the Patient  - along with the risks and benefits of alternatives.  Questions were encouraged and answered and the patient and/or representative understands and agrees to proceed.     Blood Products: Not Anticipated    Comments  Plan Comments: After discussing anesthesia plan, risks, benefits and alternatives as available; the patient and family have had any questions answered and patient and or family verbalized an understanding of the anesthesia planned for today's procedure.     Goal Outcome Evaluation:    Pt remain in CCU on bipap sating 94-95%. Pt A & O, follows command. Uses urinal. Vital stable. Will keep monitoring.

## 2022-01-08 NOTE — PROGRESS NOTES
"Pharmacy Consult - Lovenox    Renaldo Rodriguez has been consulted for pharmacy to dose enoxaparin for DVT/PE per Dr. Solares's request.       Relevant clinical data and objective history reviewed:  56 y.o. male 175 cm (68.9\") 107 kg (235 lb 14.3 oz)    Home Anticoagulation: none    Past Medical History:   Diagnosis Date    Biceps muscle strain 11/2021    torn bicep tendon    Injury of back     lifted something at work, playing golf    Lumbar herniated disc     Numbness and tingling of right lower extremity     Right leg pain      has No Known Allergies.    Lab Results   Component Value Date    WBC 11.09 (H) 01/07/2022    HGB 11.9 (L) 01/07/2022    HCT 35.0 (L) 01/07/2022    MCV 81.0 01/07/2022     01/07/2022     Lab Results   Component Value Date    GLUCOSE 115 (H) 01/07/2022    CALCIUM 7.9 (L) 01/07/2022     01/07/2022    K 4.4 01/07/2022    CO2 21.8 (L) 01/07/2022     01/07/2022    BUN 20 01/07/2022    CREATININE 0.72 (L) 01/07/2022    EGFRIFNONA 113 01/07/2022    BCR 27.8 (H) 01/07/2022    ANIONGAP 10.2 01/07/2022       Estimated Creatinine Clearance: 136.3 mL/min (A) (by C-G formula based on SCr of 0.72 mg/dL (L)).    Active Inpatient Anticoagulation Orders:     Assessment/Plan    Will start patient on 110 mg (1 mg/kg) subcutaneous every 12 hours (Lovenox 80 mg now due to patient received lovenox 40 mg at 1700), adjusted for renal function. Labs to be ordered.  Pharmacy will continue to follow.  D-Dimer > 20.     Vicente Mack III, [facility]    "

## 2022-01-09 NOTE — PROGRESS NOTES
Pernell Alcantar MD                          [phone]      Patient ID:    Name:  Renaldo Rodriguez    MRN:  [id number]    1965   56 y.o.  male            Patient Care Team:  Provider, No Known as PCP - General    CC/ Reason for visit: Acute respiratory failure, acute COVID-19 pneumonia, unvaccinated patient, DVT/PE    Subjective: Pt seen and examined this AM.  Patient remains on noninvasive ventilator and is dependent on it.  Unable to take long breaks.  Able to tolerate anticoagulation.  On high-dose steroids.  Responded well to diuretics.  No new fevers.    ROS: Unable to obtain more than about due to severe respiratory failure    Objective     Vital Signs past 24hrs    BP range: BP: (116-138)/(80-94) 119/88  Pulse range: Heart Rate:  [59-78] 62  Resp rate range: Resp:  [22-32] 25  Temp range: Temp (24hrs), Av.5 °F (36.4 °C), Min:96.8 °F (36 °C), Max:98.2 °F (36.8 °C)      Ventilator/Non-Invasive Ventilation Settings (From admission, onward)            None          Device (Oxygen Therapy): NPPV/NIV       108 kg (237 lb 14 oz); Body mass index is 35.23 kg/m².      Intake/Output Summary (Last 24 hours) at 2022 1228  Last data filed at 2022 0530  Gross per 24 hour   Intake 680 ml   Output 2700 ml   Net -2020 ml       PHYSICAL EXAM   Constitutional: Middle aged pt in bed, morbidly obese white male in mild respiratory distress while on NIPPV  Head: - NCAT  Eyes: No pallor.  Anicteric sclerae, EOMI.  ENMT:  Mask +, no oral thrush. Moist MM.   NECK: Trachea midline, No thyromegaly, no palpable cervical lymphadenopathy  Heart: RRR, no murmur. Trace bilateral pedal edema   Lungs: RENATO +, decreased breath sounds at the bases full no wheezes/ crackles heard    Abdomen: Soft. Obese. No tenderness, guarding or rigidity. No palpable masses  Extremities: Extremities warm and well perfused. No cyanosis/ clubbing  Neuro: Conscious, answers appropriately, no gross focal neuro  deficits  Psych: Mood and affect -anxious    PPE recommended per [facility] infectious disease Isolation protocol for the current clinical scenario(as mentioned below) was followed.     Scheduled meds:  cefepime, 2 g, Intravenous, Q8H  cholecalciferol, 1,000 Units, Oral, Daily  dexamethasone, 10 mg, Oral, Q12H  enoxaparin, 1 mg/kg, Subcutaneous, Q12H  famotidine, 20 mg, Oral, Daily  ipratropium-albuterol, 3 mL, Nebulization, 4x Daily - RT  pravastatin, 10 mg, Oral, Nightly  sodium chloride, 10 mL, Intravenous, Q12H        IV meds:                           Data Review:      Results from last 7 days   Lab Units 01/09/22  0559 01/08/22  0638 01/07/22  0755 01/05/22  0623 01/04/22  0908 01/03/22  0207 01/02/22 2019   SODIUM mmol/L 140 137 137   < > 134*   < > 136   POTASSIUM mmol/L 4.9 4.5 4.4   < > 3.7   < > 3.9   CHLORIDE mmol/L 105 104 105   < > 102   < > 96*   CO2 mmol/L 21.6* 21.9* 21.8*   < > 21.8*   < > 19.8*   BUN mg/dL 30* 22* 20   < > 25*   < > 15   CREATININE mg/dL 0.89 0.76 0.72*   < > 0.80   < > 1.17   CALCIUM mg/dL 8.4* 8.1* 7.9*   < > 8.5*   < > 8.6   BILIRUBIN mg/dL 0.9 0.8 0.6   < > 0.4   < > 0.8  0.8   ALK PHOS U/L 73 78 68   < > 59   < > 76  74   ALT (SGPT) U/L 47* 45* 42*   < > 35   < > 46*  46*   AST (SGOT) U/L 31 38 36   < > 47*   < > 72*  69*   GLUCOSE mg/dL 137* 120* 115*   < > 147*   < > 128*   WBC 10*3/mm3 11.90* 11.79* 11.09*   < > 10.41   < > 10.20   HEMOGLOBIN g/dL 13.1 12.7* 11.9*   < > 12.2*   < > 14.2   PLATELETS 10*3/mm3 380 360 418   < > 407   < > 329   PROBNP pg/mL  --   --   --   --   --   --  355.9   PROCALCITONIN ng/mL  --   --  0.49*  --  5.86*  --  2.38*    < > = values in this interval not displayed.       Lab Results   Component Value Date    CALCIUM 8.4 (L) 01/09/2022    PHOS 3.9 01/09/2022       Results from last 7 days   Lab Units 01/04/22  1552 01/02/22 2027   BLOODCX   --  No growth at 5 days   RESPCX  Light growth (2+) Normal respiratory jana. No S. aureus or  Pseudomonas aeruginosa detected. Final report.  --        COVID LABS:  Results From Last 14 Days   Lab Units 01/09/22  0559 01/08/22  0638 01/07/22  0755 01/06/22  1018 01/05/22  0623 01/04/22  0908 01/04/22  0908 01/03/22  1340 01/03/22  0207 01/02/22 2019 01/02/22 2019   PROBNP pg/mL  --   --   --   --   --   --   --   --   --   --  355.9   CK TOTAL U/L 84 160 230*   < > 572*   < > 844*  --   --    < > 1,229*   CRP mg/dL 12.78* 8.52* 7.35*   < > 5.83*   < > 12.47*  --   --    < > 26.49*  25.13*   D DIMER QUANT MCGFEU/mL 12.26*  --  >20.00*  --  3.20*  --   --   --  2.26*   < > 2.50*   FERRITIN ng/mL 3,565.00* 2,391.00* 1,778.00*   < > 1,697.00*   < > 2,938.00*  --   --    < > 3,621.00*   LACTATE mmol/L  --   --   --   --   --   --   --   --  1.6  --   --    LDH U/L 614*  --  665*  --  585*  --   --   --   --    < > 849*   PROCALCITONIN ng/mL  --   --  0.49*  --   --   --  5.86*  --   --   --  2.38*   TROPONIN T ng/mL  --   --   --   --   --   --  <0.010 <0.010 <0.010   < > <0.010   TRIGLYCERIDES mg/dL  --   --   --   --   --   --  140  --   --   --   --     < > = values in this interval not displayed.            Results Review:    I have reviewed the relevant laboratory results and independently reviewed the chest imaging from this hospitalization including the available echocardiogram reports personally and summarized it if/ when appropriate below    Assessment    Acute hypoxic respiratory failure s/p HHFNC/ NRB  Noninvasive ventilator use  Acute COVID-19 pneumonia - unvaccinated patient  Acute pulmonary embolism on full AC  Secondary bacterial pneumonia on antibiotics  Cytokine release syndrome s/p remdesivir  Previous biceps surgery  JOSHUA - Noncompliant with PAP  Morbid obesity    PLAN:  Patient continues to be a noninvasive ventilator dependent at this point and ventilator settings have been adjusted as appropriate.  He is able to tolerate it fairly well so far.  We will continue current treatment plan of  steroids and anticoagulation for DVT/PE.  Inflammatory markers still elevating and patient is a high risk for further decompensation.  Patient already on antibiotic course for secondary pneumonia   We will use as needed diuretics to keep him as negative as possible  Full code    DVT/gi prophylaxis    Very guarded prognosis. High risk for need for intubation mechanical ventilation. Unfortunately unvaccinated patient with multiple comorbidities. Discussed the same with the patient and he understands.     D/w RN/ RT    CC - 40 mins    Pernell Alcantar MD  1/9/2022

## 2022-01-10 NOTE — PROGRESS NOTES
Pernell Alcantar MD                          [phone]      Patient ID:    Name:  Renaldo Rodriguez    MRN:  [id number]    1965   56 y.o.  male            Patient Care Team:  Provider, No Known as PCP - General    CC/ Reason for visit: Acute respiratory failure, acute COVID-19 pneumonia, unvaccinated patient, DVT/PE    Subjective: Pt seen and examined this AM.  Patient continues to need noninvasive ventilator.  Able to take longer breaks now.  No new fevers.  Tolerating current treatment plan of high-dose steroids and anticoagulation.  Feels like he is getting little better    ROS: Unable to obtain more than about due to severe respiratory failure    Objective     Vital Signs past 24hrs    BP range: BP: (100-133)/(67-93) 120/75  Pulse range: Heart Rate:  [55-81] 81  Resp rate range: Resp:  [22-28] 22  Temp range: Temp (24hrs), Av.6 °F (37 °C), Min:97.8 °F (36.6 °C), Max:99.1 °F (37.3 °C)      Ventilator/Non-Invasive Ventilation Settings (From admission, onward)            None          Device (Oxygen Therapy): heated; humidified; high-flow nasal cannula; nonrebreather mask       102 kg (225 lb 12 oz); Body mass index is 33.44 kg/m².      Intake/Output Summary (Last 24 hours) at 1/10/2022 1250  Last data filed at 1/10/2022 1200  Gross per 24 hour   Intake 1379 ml   Output 2025 ml   Net -646 ml       PHYSICAL EXAM   Constitutional: Middle aged pt in bed, morbidly obese white male in mild respiratory distress  Head: - NCAT  Eyes: No pallor.  Anicteric sclerae, EOMI.  ENMT:  Mallampati 4, no oral thrush. Moist MM.   NECK: Trachea midline, No thyromegaly, no palpable cervical lymphadenopathy  Heart: RRR, no murmur. Trace bilateral pedal edema   Lungs: REANTO +, decreased breath sounds at the bases full no wheezes/ crackles heard    Abdomen: Soft. Obese. No tenderness, guarding or rigidity. No palpable masses  Extremities: Extremities warm and well perfused. No cyanosis/  clubbing  Neuro: Conscious, answers appropriately, no gross focal neuro deficits  Psych: Mood and affect -anxious    PPE recommended per [facility] infectious disease Isolation protocol for the current clinical scenario(as mentioned below) was followed.     Scheduled meds:  cholecalciferol, 1,000 Units, Oral, Daily  dexamethasone, 10 mg, Oral, Q12H  enoxaparin, 1 mg/kg, Subcutaneous, Q12H  famotidine, 20 mg, Oral, Daily  furosemide, 40 mg, Intravenous, Daily  ipratropium-albuterol, 3 mL, Nebulization, 4x Daily - RT  polyethylene glycol, 17 g, Oral, Daily  pravastatin, 10 mg, Oral, Nightly  senna-docusate sodium, 2 tablet, Oral, BID  sodium chloride, 10 mL, Intravenous, Q12H        IV meds:                           Data Review:      Results from last 7 days   Lab Units 01/10/22  0647 01/09/22  0559 01/08/22  0638 01/07/22  0755 01/07/22  0755 01/05/22  0623 01/04/22  0908   SODIUM mmol/L 136 140 137   < > 137   < > 134*   POTASSIUM mmol/L 4.7 4.9 4.5   < > 4.4   < > 3.7   CHLORIDE mmol/L 103 105 104   < > 105   < > 102   CO2 mmol/L 22.5 21.6* 21.9*   < > 21.8*   < > 21.8*   BUN mg/dL 34* 30* 22*   < > 20   < > 25*   CREATININE mg/dL 0.93 0.89 0.76   < > 0.72*   < > 0.80   CALCIUM mg/dL 8.7 8.4* 8.1*   < > 7.9*   < > 8.5*   BILIRUBIN mg/dL 0.9 0.9 0.8   < > 0.6   < > 0.4   ALK PHOS U/L 72 73 78   < > 68   < > 59   ALT (SGPT) U/L 40 47* 45*   < > 42*   < > 35   AST (SGOT) U/L 26 31 38   < > 36   < > 47*   GLUCOSE mg/dL 142* 137* 120*   < > 115*   < > 147*   WBC 10*3/mm3 14.21* 11.90* 11.79*   < > 11.09*   < > 10.41   HEMOGLOBIN g/dL 13.1 13.1 12.7*   < > 11.9*   < > 12.2*   PLATELETS 10*3/mm3 392 380 360   < > 418   < > 407   PROCALCITONIN ng/mL  --   --   --   --  0.49*  --  5.86*    < > = values in this interval not displayed.       Lab Results   Component Value Date    CALCIUM 8.7 01/10/2022    PHOS 3.8 01/10/2022       Results from last 7 days   Lab Units 01/04/22  1552   RESPCX  Light growth (2+) Normal  respiratory jana. No S. aureus or Pseudomonas aeruginosa detected. Final report.       COVID LABS:  Results From Last 14 Days   Lab Units 01/10/22  0647 01/09/22  0559 01/08/22  0638 01/07/22  0755 01/07/22  0755 01/06/22  1018 01/05/22  0623 01/04/22  0908 01/04/22  0908 01/03/22  1340 01/03/22  0207 01/02/22 2019 01/02/22 2019   PROBNP pg/mL  --   --   --   --   --   --   --   --   --   --   --   --  355.9   CK TOTAL U/L 50 84 160   < > 230*   < > 572*   < > 844*  --   --    < > 1,229*   CRP mg/dL 8.14* 12.78* 8.52*   < > 7.35*   < > 5.83*   < > 12.47*  --   --    < > 26.49*  25.13*   D DIMER QUANT MCGFEU/mL  --  12.26*  --   --  >20.00*  --  3.20*  --   --   --  2.26*   < > 2.50*   FERRITIN ng/mL 3,970.00* 3,565.00* 2,391.00*   < > 1,778.00*   < > 1,697.00*   < > 2,938.00*  --   --    < > 3,621.00*   LACTATE mmol/L  --   --   --   --   --   --   --   --   --   --  1.6  --   --    LDH U/L  --  614*  --   --  665*  --  585*  --   --   --   --    < > 849*   PROCALCITONIN ng/mL  --   --   --   --  0.49*  --   --   --  5.86*  --   --   --  2.38*   TROPONIN T ng/mL  --   --   --   --   --   --   --   --  <0.010 <0.010 <0.010   < > <0.010   TRIGLYCERIDES mg/dL  --   --   --   --   --   --   --   --  140  --   --   --   --     < > = values in this interval not displayed.            Results Review:    I have reviewed the relevant laboratory results and independently reviewed the chest imaging from this hospitalization including the available echocardiogram reports personally and summarized it if/ when appropriate below    Assessment    Acute hypoxic respiratory failure s/p HHFNC/ NRB  Noninvasive ventilator use  Acute COVID-19 pneumonia - unvaccinated patient  Acute pulmonary embolism on full AC  Secondary bacterial pneumonia on antibiotics  Cytokine release syndrome s/p remdesivir  Previous biceps surgery  JOSHUA - Noncompliant with PAP  Morbid obesity    PLAN:  Patient continues to be on noninvasive ventilator - able  to take more breaks now. rec'd to use with naps and night without fail.   C/w steroids and anticoagulation for DVT/PE.    Inflammatory markers are turning around finally.  Patient already on antibiotic course for secondary pneumonia   We will use as needed diuretics to keep him as negative as possible  Full code    DVT/gi prophylaxis    Very guarded prognosis. High risk for need for intubation mechanical ventilation. Unfortunately unvaccinated patient with multiple comorbidities. Discussed the same with the patient and he understands.     D/w RN/ RT    CC - 40 mins    Pernell Alcantar MD  1/10/2022

## 2022-01-10 NOTE — PLAN OF CARE
Goal Outcome Evaluation:  Plan of Care Reviewed With: patient, spouse        Progress: no change  Pt remains in CCU for COVID treatment.  A&O.  VSS.  Tolerated opti-flow and nonrebreather all of shift; plan is for bipap tonight.  Poor appetite but drinking water well.  UOP via urinal.  No BM; bowel regimen ordered; pt refused.  Discussed plan with patient and then spouse via telephone.

## 2022-01-10 NOTE — SIGNIFICANT NOTE
Pt requests lab to stick for blood. States vessels are bruising after sticks. Only has one limb to stick

## 2022-01-10 NOTE — PAYOR COMM NOTE
"Cecil Rodriguez (56 y.o. Male)                     ATTENTION;   CONTINUED STAY CLINICALS     AUTH#[id number]                    PATIENT TRANSFERRED TO CORONARY CARE UNIT 1/7 @8284, REPLY TO UR DEPT FAX                    [phone] OR CALL   NATHAN [facility]                      Date of Birth Social Security Number Address Home Phone MRN    1965  [id number] [facility] [place] [address] [phone] [id number]    Restoration Marital Status             Mu-ism        Admission Date Admission Type Admitting Provider Attending Provider Department, Room/Bed    1/2/22 Emergency John Maher MD Snyder, Perry, MD [facility] CORONARY CARE, N331/1    Discharge Date Discharge Disposition Discharge Destination                         Attending Provider: Giorgi Mann MD    Allergies: No Known Allergies    Isolation: Enh Drop/Con   Infection: COVID (confirmed) (01/01/22)   Code Status: CPR   Advance Care Planning Activity    Ht: 175 cm (68.9\")   Wt: 102 kg (225 lb 12 oz)    Admission Cmt: None   Principal Problem: Pneumonia due to COVID-19 virus [U07.1,J12.82]                 Active Insurance as of 1/2/2022     Primary Coverage     Payor Plan Insurance Group Employer/Plan Group    [employer]CIAL AETNA [id number]     Payor Plan Address Payor Plan Phone Number Payor Plan Fax Number Effective Dates    [address] [phone]  12/31/2021 - None Entered    [facility] [address]       Subscriber Name Subscriber Birth Date Member ID       CECIL RODRIGUEZ 1965 [id number]                 Emergency Contacts      (Rel.) Home Phone Work Phone Mobile Phone    Ban Rodriguez (Spouse) [phone] -- --    Samson Rodriguez (Brother) -- -- [phone]            Vital Signs (last day)     Date/Time Temp Temp src Pulse Resp BP Patient Position SpO2    01/10/22 1400 -- -- 71 -- 123/83 -- 89    01/10/22 1300 -- -- 75 -- 118/81 -- 89    01/10/22 1200 -- -- " 81 -- 120/75 -- 90    01/10/22 1134 -- -- -- 22 -- -- --    01/10/22 1100 -- -- 74 -- 115/79 -- 90    01/10/22 1007 -- -- -- -- -- -- 88    01/10/22 1000 -- -- 70 -- 128/91 -- --    01/10/22 0900 -- -- 60 -- 121/74 -- 90    01/10/22 0800 -- -- 68 -- 118/85 -- 87    01/10/22 0726 -- -- 58 24 -- -- 88    01/10/22 0700 -- -- 58 -- 112/76 -- 89    01/10/22 0600 -- -- 55 -- 106/72 -- 89    01/10/22 0500 -- -- 56 -- 106/71 -- 88    01/10/22 0400 99.1 (37.3) Oral 63 -- 113/73 -- 90    01/10/22 0300 -- -- 72 -- 107/76 -- 86    01/10/22 0200 -- -- 58 -- 100/67 -- 87    01/10/22 0100 -- -- 60 -- 116/68 -- 92    01/10/22 0000 -- -- 58 -- 107/68 -- 94    01/09/22 2338 -- -- 59 -- -- -- 93    01/09/22 2300 -- -- 58 -- 104/70 -- 94    01/09/22 2200 -- -- 62 -- 124/76 -- 94    01/09/22 2100 -- -- 64 -- 119/77 -- 95    01/09/22 2000 98.9 (37.2) Oral 66 -- 116/76 -- 96    01/09/22 1951 -- -- 67 28 -- -- 94    01/09/22 1900 -- -- 69 -- 133/87 -- 93    01/09/22 1800 -- -- 77 -- 114/79 -- 89    01/09/22 1700 -- -- 68 -- 116/87 -- 94    01/09/22 1617 97.8 (36.6) Oral -- -- -- -- --    01/09/22 1600 -- -- 70 22 127/93 Lying 92    01/09/22 1513 -- -- 59 24 -- -- 95    01/09/22 1500 -- Oral 61 -- 123/85 -- 95    01/09/22 1400 -- -- 64 -- 124/87 -- 92    01/09/22 1300 -- -- 80 -- 133/90 -- 89    01/09/22 1200 -- -- 75 22 95/79 Lying 90    01/09/22 1151 98.2 (36.8) Axillary -- -- -- -- --    01/09/22 1134 -- -- 62 25 -- -- 94    01/09/22 1100 -- -- 60 -- 112/85 -- 92    01/09/22 1000 -- -- 61 -- 119/88 -- 90    01/09/22 0900 -- -- 60 -- 130/85 -- 91    01/09/22 0802 -- -- 60 32 -- -- 88    01/09/22 0800 -- -- 59 24 126/86 Lying 90    01/09/22 0750 98.1 (36.7) Axillary -- -- -- -- --    01/09/22 0700 -- -- 70 -- 130/93 -- 92    01/09/22 0600 -- -- 66 -- 126/87 -- 93    01/09/22 0500 -- -- 61 -- 116/84 -- 88    01/09/22 0405 -- -- 69 22 -- -- 92    01/09/22 0400 -- -- 67 -- 124/83 -- 90    01/09/22 0300 96.8 (36) Axillary 63 -- 121/82 -- 92     01/09/22 0200 -- -- 75 -- 130/87 -- 90    01/09/22 0100 -- -- 62 -- 127/87 -- 89    01/09/22 0013 -- -- 64 23 -- -- 90    01/09/22 0000 -- -- 64 -- 117/85 -- 91          Oxygen Therapy (last day)     Date/Time SpO2 Device (Oxygen Therapy) Flow (L/min) Oxygen Concentration (%) ETCO2 (mmHg)    01/10/22 1400 89 -- -- -- --    01/10/22 1300 89 -- -- -- --    01/10/22 1200 90 heated; humidified; high-flow nasal cannula; nonrebreather mask 60 100 --    01/10/22 1134 -- heated; humidified; high-flow nasal cannula; nonrebreather mask 60 100 --    01/10/22 1100 90 -- -- -- --    01/10/22 1049 -- humidified; heated; high-flow nasal cannula 60 100 --    01/10/22 1007 88 -- -- -- --    01/10/22 0909 -- heated; humidified; high-flow nasal cannula; nonrebreather mask 60 100 --    01/10/22 0900 90 -- -- -- --    01/10/22 0800 87 -- -- -- --    01/10/22 0726 88 -- 60 100 --    01/10/22 0700 89 -- -- -- --    01/10/22 0600 89 -- -- -- --    01/10/22 0500 88 -- -- -- --    01/10/22 0400 90 heated; high-flow nasal cannula 60 100 --    01/10/22 0300 86 -- -- -- --    01/10/22 0200 87 -- -- -- --    01/10/22 0100 92 -- -- -- --    01/10/22 0000 94 heated; high-flow nasal cannula 60 100 --    01/09/22 2338 93 heated; high-flow nasal cannula; humidified; nonrebreather mask 60 100 --    01/09/22 2300 94 -- -- -- --    01/09/22 2200 94 -- -- -- --    01/09/22 2100 95 -- -- -- --    01/09/22 2000 96 -- -- -- --    01/09/22 1951 94 heated; high-flow nasal cannula; humidified; nonrebreather mask 60 100 --    01/09/22 1900 93 -- -- -- --    01/09/22 1800 89 -- -- -- --    01/09/22 1700 94 -- -- -- --    01/09/22 1600 92 NPPV/NIV -- 100 --    01/09/22 1514 -- -- -- 100 --    01/09/22 1513 95 NPPV/NIV -- 100 --    01/09/22 1500 95 -- -- -- --    01/09/22 1400 92 -- -- -- --    01/09/22 1300 89 -- -- -- --    01/09/22 1230 -- NPPV/NIV -- 100 --    01/09/22 1200 90 -- -- -- --    01/09/22 1134 94 NPPV/NIV -- 100 --    01/09/22 1100 92 -- -- -- --     01/09/22 1000 90 -- -- -- --    01/09/22 0900 91 -- -- -- --    01/09/22 0815 -- NPPV/NIV -- 100 --    01/09/22 0802 88 NPPV/NIV -- 100 --    01/09/22 0800 90 -- -- -- --    01/09/22 0700 92 -- -- -- --    01/09/22 0600 93 -- -- -- --    01/09/22 0500 88 -- -- -- --    01/09/22 0412 -- NPPV/NIV -- -- --    01/09/22 0405 92 NPPV/NIV -- 100 --    01/09/22 0400 90 -- -- -- --    01/09/22 0300 92 -- -- -- --    01/09/22 0200 90 -- -- -- --    01/09/22 0100 89 -- -- -- --    01/09/22 0019 -- -- -- 100 --    01/09/22 0013 90 NPPV/NIV -- 100 --    01/09/22 0000 91 NPPV/NIV -- -- --          Orders (last 24 hrs)      Start     Ordered    01/10/22 2100  famotidine (PEPCID) tablet 20 mg  2 Times Daily         01/10/22 1253    01/10/22 1330  sennosides-docusate (PERICOLACE) 8.6-50 MG per tablet 2 tablet  2 Times Daily         01/10/22 1235    01/10/22 1330  polyethylene glycol (MIRALAX) packet 17 g  Daily         01/10/22 1235    01/10/22 0855  POC Glucose Once  PROCEDURE ONCE         01/10/22 0826    01/10/22 0758  Manual Differential  Once         01/10/22 0757    01/10/22 0712  Manual Differential  Once,   Status:  Canceled         01/10/22 0711    01/10/22 0600  CBC Auto Differential  PROCEDURE ONCE         01/09/22 2203    01/10/22 0600  Bilirubin, Direct  PROCEDURE ONCE         01/09/22 2203 01/09/22 1807  POC Glucose Once  PROCEDURE ONCE         01/09/22 1805    01/09/22 1315  furosemide (LASIX) injection 40 mg  Daily         01/09/22 1229    01/08/22 2100  dexamethasone (DECADRON) tablet 10 mg  Every 12 Hours Scheduled         01/08/22 1117    01/08/22 1230  ipratropium-albuterol (DUO-NEB) nebulizer solution 3 mL  4 Times Daily - RT         01/08/22 1117    01/08/22 1200  enoxaparin (LOVENOX) syringe 110 mg  Every 12 Hours         01/07/22 2353    01/08/22 0900  sodium chloride 0.9 % flush 10 mL  Every 12 Hours Scheduled         01/08/22 0531    01/08/22 0600  Vital Signs Every Hour and Per Hospital Policy Based  on Patient Condition  Every Hour       01/08/22 0531    01/08/22 0600  Intake and Output  Every Hour       01/08/22 0531    01/08/22 0600  Incentive Spirometry  Every 2 Hours While Awake       01/08/22 0531    01/08/22 0532  Daily Weights  Daily       01/08/22 0531    01/08/22 0532  Basic Metabolic Panel  Daily       01/08/22 0531    01/08/22 0532  CBC & Differential  Daily       01/08/22 0531    01/08/22 0532  Magnesium  Daily       01/08/22 0531    01/08/22 0532  Phosphorus  Daily       01/08/22 0531    01/08/22 0519  Oral care for patients not on NPPV or intubated  Every Shift      Comments: Oral care for patients not on NPPV or intubated    01/08/22 0531    01/08/22 0518  sodium chloride 0.9 % flush 10 mL  As Needed         01/08/22 0531    01/07/22 1317  ALPRAZolam (XANAX) tablet 0.5 mg  3 Times Daily PRN         01/07/22 1317    01/04/22 2100  pravastatin (PRAVACHOL) tablet 10 mg  Nightly         01/04/22 1036    01/04/22 2000  cefepime 2 gm IVPB in 100 ml NS (VTB)  Every 8 Hours         01/04/22 1105    01/03/22 1630  Oscillating Positive Expiratory Pressure (OPEP)  4 Times Daily - RT       01/03/22 1241    01/03/22 1000  cholecalciferol (VITAMIN D3) tablet 1,000 Units  Daily         01/03/22 0855    01/03/22 0900  famotidine (PEPCID) tablet 20 mg  Daily,   Status:  Discontinued         01/02/22 2236 01/03/22 0600  Incentive Spirometry  Every 4 Hours While Awake       01/02/22 2236 01/03/22 0600  CBC Auto Differential  Daily       01/02/22 2236 01/03/22 0600  CK  Daily       01/02/22 2236 01/03/22 0600  Comprehensive Metabolic Panel  Daily       01/02/22 2236 01/03/22 0600  Ferritin  Daily       01/02/22 2236 01/03/22 0600  C-reactive Protein  Daily       01/02/22 2236 01/03/22 0600  Lactate Dehydrogenase  Every 48 Hours       01/02/22 2236 01/03/22 0600  D-dimer, Quantitative  Every 48 Hours       01/02/22 2236 01/03/22 0600  Fibrinogen  Every 48 Hours       01/02/22 2236     "01/03/22 0000  Vital Signs  Every 4 Hours      Comments: Per per hospital policy    01/02/22 2236 01/02/22 2237  Hepatic Function Panel  Daily       01/02/22 2236 01/02/22 2237  Creatinine, Serum  Daily       01/02/22 2236 01/02/22 2236  Intake & Output  Every Shift       01/02/22 2236 01/02/22 2235  ondansetron (ZOFRAN) tablet 4 mg  Every 4 Hours PRN        \"Or\" Linked Group Details    01/02/22 2236 01/02/22 2235  ondansetron (ZOFRAN) injection 4 mg  Every 4 Hours PRN        \"Or\" Linked Group Details    01/02/22 2236 01/02/22 2235  melatonin tablet 3 mg  Nightly PRN         01/02/22 2236 01/02/22 2235  acetaminophen (TYLENOL) tablet 650 mg  Every 4 Hours PRN         01/02/22 2236 01/02/22 2235  nitroglycerin (NITROSTAT) SL tablet 0.4 mg  Every 5 Minutes PRN         01/02/22 2236 01/02/22 2015  sodium chloride 0.9 % flush 10 mL  As Needed         01/02/22 2017    Unscheduled  ECG 12 Lead  As Needed      Comments: Nurse to Release if Patient Expericences Acute Chest Pain or Dysrhythmias    01/02/22 2236    Unscheduled  Potassium  As Needed      Comments: For Ventricular Arrhythmias      01/02/22 2236    Unscheduled  Magnesium  As Needed      Comments: For Ventricular Arrhythmias      01/02/22 2236    Unscheduled  Troponin  As Needed      Comments: For Chest Pain      01/02/22 2236    Unscheduled  Blood Gas, Arterial -  As Needed      Comments: Per O2 PolicyNotify Physician      01/02/22 2236    Unscheduled  ECG 12 Lead  As Needed      Comments: For ICU/CCU Protocol Orders.  Nurse to Release if Patient Experiences Acute Chest Pain or Dysrhythmias    01/08/22 0531    Unscheduled  Potassium  As Needed        Comments: Sudden Ventricular Dysrhythmias. Notify Physician.      01/08/22 0531    Unscheduled  Magnesium  As Needed        Comments: For ICU/CCU Protocol      01/08/22 0531    --  ivermectin (STROMECTOL) 3 MG tablet tablet  Once         01/02/22 2110    --  SCANNED - TELEMETRY           " 22 0000    --  SCANNED - TELEMETRY           22 0000    --  SCANNED - TELEMETRY           22 0000    --  SCANNED - TELEMETRY           22 0000    --  SCANNED - TELEMETRY           22 0000    --  SCANNED - TELEMETRY           22 0000    --  SCANNED - TELEMETRY           22 0000    --  SCANNED - TELEMETRY           22 0000                   Physician Progress Notes (last 24 hours)      Pernell Alcantar MD at 01/10/22 1250                                      Pernell Alcantar MD                          [phone]      Patient ID:    Name:  Renaldo Rodriguez    MRN:  [id number]    1965   56 y.o.  male            Patient Care Team:  Provider, No Known as PCP - General    CC/ Reason for visit: Acute respiratory failure, acute COVID-19 pneumonia, unvaccinated patient, DVT/PE    Subjective: Pt seen and examined this AM.  Patient continues to need noninvasive ventilator.  Able to take longer breaks now.  No new fevers.  Tolerating current treatment plan of high-dose steroids and anticoagulation.  Feels like he is getting little better    ROS: Unable to obtain more than about due to severe respiratory failure    Objective     Vital Signs past 24hrs    BP range: BP: (100-133)/(67-93) 120/75  Pulse range: Heart Rate:  [55-81] 81  Resp rate range: Resp:  [22-28] 22  Temp range: Temp (24hrs), Av.6 °F (37 °C), Min:97.8 °F (36.6 °C), Max:99.1 °F (37.3 °C)        Device (Oxygen Therapy): heated; humidified; high-flow nasal cannula; nonrebreather mask       102 kg (225 lb 12 oz); Body mass index is 33.44 kg/m².      Intake/Output Summary (Last 24 hours) at 1/10/2022 1250  Last data filed at 1/10/2022 1200  Gross per 24 hour   Intake 1379 ml   Output 2025 ml   Net -646 ml       PHYSICAL EXAM   Constitutional: Middle aged pt in bed, morbidly obese white male in mild respiratory distress  Head: - NCAT  Eyes: No pallor.  Anicteric sclerae, EOMI.  ENMT:  Mallampati  4, no oral thrush. Moist MM.   NECK: Trachea midline, No thyromegaly, no palpable cervical lymphadenopathy  Heart: RRR, no murmur. Trace bilateral pedal edema   Lungs: RENATO +, decreased breath sounds at the bases full no wheezes/ crackles heard    Abdomen: Soft. Obese. No tenderness, guarding or rigidity. No palpable masses  Extremities: Extremities warm and well perfused. No cyanosis/ clubbing  Neuro: Conscious, answers appropriately, no gross focal neuro deficits  Psych: Mood and affect -anxious    PPE recommended per [facility] infectious disease Isolation protocol for the current clinical scenario(as mentioned below) was followed.     Scheduled meds:  cholecalciferol, 1,000 Units, Oral, Daily  dexamethasone, 10 mg, Oral, Q12H  enoxaparin, 1 mg/kg, Subcutaneous, Q12H  famotidine, 20 mg, Oral, Daily  furosemide, 40 mg, Intravenous, Daily  ipratropium-albuterol, 3 mL, Nebulization, 4x Daily - RT  polyethylene glycol, 17 g, Oral, Daily  pravastatin, 10 mg, Oral, Nightly  senna-docusate sodium, 2 tablet, Oral, BID  sodium chloride, 10 mL, Intravenous, Q12H        IV meds:                           Data Review:      Results from last 7 days   Lab Units 01/10/22  0647 01/09/22  0559 01/08/22  0638 01/07/22  0755 01/07/22  0755 01/05/22  0623 01/04/22  0908   SODIUM mmol/L 136 140 137   < > 137   < > 134*   POTASSIUM mmol/L 4.7 4.9 4.5   < > 4.4   < > 3.7   CHLORIDE mmol/L 103 105 104   < > 105   < > 102   CO2 mmol/L 22.5 21.6* 21.9*   < > 21.8*   < > 21.8*   BUN mg/dL 34* 30* 22*   < > 20   < > 25*   CREATININE mg/dL 0.93 0.89 0.76   < > 0.72*   < > 0.80   CALCIUM mg/dL 8.7 8.4* 8.1*   < > 7.9*   < > 8.5*   BILIRUBIN mg/dL 0.9 0.9 0.8   < > 0.6   < > 0.4   ALK PHOS U/L 72 73 78   < > 68   < > 59   ALT (SGPT) U/L 40 47* 45*   < > 42*   < > 35   AST (SGOT) U/L 26 31 38   < > 36   < > 47*   GLUCOSE mg/dL 142* 137* 120*   < > 115*   < > 147*   WBC 10*3/mm3 14.21* 11.90* 11.79*   < > 11.09*   < > 10.41   HEMOGLOBIN  g/dL 13.1 13.1 12.7*   < > 11.9*   < > 12.2*   PLATELETS 10*3/mm3 392 380 360   < > 418   < > 407   PROCALCITONIN ng/mL  --   --   --   --  0.49*  --  5.86*    < > = values in this interval not displayed.       Lab Results   Component Value Date    CALCIUM 8.7 01/10/2022    PHOS 3.8 01/10/2022       Results from last 7 days   Lab Units 01/04/22  1552   RESPCX  Light growth (2+) Normal respiratory jana. No S. aureus or Pseudomonas aeruginosa detected. Final report.       COVID LABS:  Results From Last 14 Days   Lab Units 01/10/22  0647 01/09/22  0559 01/08/22  0638 01/07/22  0755 01/07/22  0755 01/06/22  1018 01/05/22  0623 01/04/22  0908 01/04/22  0908 01/03/22  1340 01/03/22  0207 01/02/22 2019 01/02/22 2019   PROBNP pg/mL  --   --   --   --   --   --   --   --   --   --   --   --  355.9   CK TOTAL U/L 50 84 160   < > 230*   < > 572*   < > 844*  --   --    < > 1,229*   CRP mg/dL 8.14* 12.78* 8.52*   < > 7.35*   < > 5.83*   < > 12.47*  --   --    < > 26.49*  25.13*   D DIMER QUANT MCGFEU/mL  --  12.26*  --   --  >20.00*  --  3.20*  --   --   --  2.26*   < > 2.50*   FERRITIN ng/mL 3,970.00* 3,565.00* 2,391.00*   < > 1,778.00*   < > 1,697.00*   < > 2,938.00*  --   --    < > 3,621.00*   LACTATE mmol/L  --   --   --   --   --   --   --   --   --   --  1.6  --   --    LDH U/L  --  614*  --   --  665*  --  585*  --   --   --   --    < > 849*   PROCALCITONIN ng/mL  --   --   --   --  0.49*  --   --   --  5.86*  --   --   --  2.38*   TROPONIN T ng/mL  --   --   --   --   --   --   --   --  <0.010 <0.010 <0.010   < > <0.010   TRIGLYCERIDES mg/dL  --   --   --   --   --   --   --   --  140  --   --   --   --     < > = values in this interval not displayed.            Results Review:    I have reviewed the relevant laboratory results and independently reviewed the chest imaging from this hospitalization including the available echocardiogram reports personally and summarized it if/ when appropriate below    Assessment     Acute hypoxic respiratory failure s/p HHFNC/ NRB  Noninvasive ventilator use  Acute COVID-19 pneumonia - unvaccinated patient  Acute pulmonary embolism on full AC  Secondary bacterial pneumonia on antibiotics  Cytokine release syndrome s/p remdesivir  Previous biceps surgery  JOSHUA - Noncompliant with PAP  Morbid obesity    PLAN:  Patient continues to be on noninvasive ventilator - able to take more breaks now. rec'd to use with naps and night without fail.   C/w steroids and anticoagulation for DVT/PE.    Inflammatory markers are turning around finally.  Patient already on antibiotic course for secondary pneumonia   We will use as needed diuretics to keep him as negative as possible  Full code    DVT/gi prophylaxis    Very guarded prognosis. High risk for need for intubation mechanical ventilation. Unfortunately unvaccinated patient with multiple comorbidities. Discussed the same with the patient and he understands.     D/w RN/ RT    CC - 40 mins    Pernell Alcantar MD  1/10/2022    Electronically signed by Pernell Alcantar MD at 01/10/22 4748

## 2022-01-10 NOTE — PLAN OF CARE
Goal Outcome Evaluation:   Remained on opti-palmira with nrb during night. Slept most of night. Sat's low to mid 90's, rare 84-86% while asleep r/t sleep apnea. When sat's dropped while asleep would quickly climb back to low 90's. Mental status remains normal

## 2022-01-10 NOTE — PLAN OF CARE
Goal Outcome Evaluation:    Pt remain in CCU on bipap today. Around 1800 switched to 60 L 100% optiflow and 15L NRB sating 91-92% tolerating well. Given Abx and lasix. Uses urinal. A & O X 4. Vital stable . Will keep monitoring.

## 2022-01-11 NOTE — THERAPY TREATMENT NOTE
Patient Name: Renaldo Rodriguez  : 1965    MRN: [id number]                              Today's Date: 2022       Admit Date: 2022    Visit Dx:     ICD-10-CM ICD-9-CM   1. Pneumonia due to COVID-19 virus  U07.1 480.8    J12.82 079.89   2. Acute respiratory failure with hypoxia (HCC)  J96.01 518.81     Patient Active Problem List   Diagnosis   • Neuritis or radiculitis due to rupture of lumbar intervertebral disc   • Pneumonia due to COVID-19 virus   • Acute respiratory failure with hypoxia and hypercapnia (HCC)   • Elevated liver enzymes   • Lymphocytopenia   • Acute transaminitis due to COVID-19 viral infection   • Class 2 severe obesity with serious comorbidity in adult (HCC)   • JOSHUA (obstructive sleep apnea)   • Bacterial pneumonia   • Abnormal EKG   • Hyperlipidemia, elevated LDL     Past Medical History:   Diagnosis Date   • Biceps muscle strain 2021    torn bicep tendon   • Injury of back     lifted something at work, playing golf   • Lumbar herniated disc    • Numbness and tingling of right lower extremity    • Right leg pain      Past Surgical History:   Procedure Laterality Date   • BICEPS TENDON REPAIR Right    • LUMBAR DISCECTOMY Right 10/4/2019    Procedure: Right lumbar 4 to lumbar 5 laminectomy and discectomy with metrx;  Surgeon: Ghanshyam Canas MD;  Location: [facility];  Service: Neurosurgery   • NO PAST SURGERIES        General Information     Row Name 22 1636          Physical Therapy Time and Intention    Document Type therapy note (daily note)  -AR     Mode of Treatment physical therapy  -AR     Row Name 22 9156          General Information    Patient Profile Reviewed yes  -AR     Existing Precautions/Restrictions right; lifting; other (see comments)  R UE no lifting, h/oR bicep tendon repair  -AR           User Key  (r) = Recorded By, (t) = Taken By, (c) = Cosigned By    Initials Name Provider Type    AR Jenn Samson PT Physical Therapist                Mobility     Row Name 01/11/22 1637          Bed Mobility    Bed Mobility supine-sit  -AR     Supine-Sit Fannin (Bed Mobility) standby assist; verbal cues  -AR     Sit-Supine Fannin (Bed Mobility) standby assist  -AR     Assistive Device (Bed Mobility) bed rails; head of bed elevated  -AR     Comment (Bed Mobility) no verbal cues required, no safety concerns.  -AR           User Key  (r) = Recorded By, (t) = Taken By, (c) = Cosigned By    Initials Name Provider Type    AR Jenn Samson, PT Physical Therapist               Obj/Interventions     Row Name 01/11/22 1639          Motor Skills    Functional Endurance Poor, SP02 to 84% after sitting up for few minutes, returned to supine.  Lowest seen at 79%, RN aware.  Sp02 returned to high 80s after several minutes  -AR     Therapeutic Exercise --  AP, heel slides in bed, 10x, SP02 maintained in upper 80s  -AR     Row Name 01/11/22 1639          Balance    Balance Assessment sitting static balance  -AR     Static Sitting Balance WNL  -AR           User Key  (r) = Recorded By, (t) = Taken By, (c) = Cosigned By    Initials Name Provider Type    Jenn Jon, PT Physical Therapist               Goals/Plan    No documentation.                Clinical Impression     [facility] Name 01/11/22 1641          Pain    Additional Documentation Pain Scale: Numbers Pre/Post-Treatment (Group)  -AR     [facility] Name 01/11/22 1641          Pain Scale: Numbers Pre/Post-Treatment    Pretreatment Pain Rating 0/10 - no pain  -AR     Posttreatment Pain Rating 0/10 - no pain  -AR     Pain Intervention(s) Repositioned  -AR     [facility] Name 01/11/22 1641          Plan of Care Review    Plan of Care Reviewed With patient  -AR     Outcome Summary Limited progress towards goals during PT today due to 02 saturations once sitting up.  SP02 to 79%.  Pt able to move supine<>sit with stand by assist, no safety concerns. SP02 returned to high 80s on 15L NRB and optiflow.  -AR     [facility] Name 01/11/22  1641          Therapy Assessment/Plan (PT)    Rehab Potential (PT) fair, will monitor progress closely  -AR     Criteria for Skilled Interventions Met (PT) yes  -AR     Row Name 01/11/22 1641          Vital Signs    Pre SpO2 (%) 89  -AR     O2 Delivery Pre Treatment --  NRB and optiflow maxed out  -AR     Intra SpO2 (%) 79  -AR     Post SpO2 (%) 87  RN present  -AR     Row Name 01/11/22 1641          Positioning and Restraints    Pre-Treatment Position in bed  -AR     Post Treatment Position bed  -AR     In Bed supine; call light within reach; with nsg  -AR           User Key  (r) = Recorded By, (t) = Taken By, (c) = Cosigned By    Initials Name Provider Type    AR Jenn Samson, PT Physical Therapist               Outcome Measures     Row Name 01/11/22 1644          How much help from another person do you currently need...    Turning from your back to your side while in flat bed without using bedrails? 4  -AR     Moving from lying on back to sitting on the side of a flat bed without bedrails? 3  -AR     Moving to and from a bed to a chair (including a wheelchair)? 2  -AR     Standing up from a chair using your arms (e.g., wheelchair, bedside chair)? 3  -AR     Climbing 3-5 steps with a railing? 1  -AR     To walk in hospital room? 3  -AR     AM-PAC 6 Clicks Score (PT) 16  -AR     Row Name 01/11/22 1644          Functional Assessment    Outcome Measure Options AM-PAC 6 Clicks Basic Mobility (PT)  -AR           User Key  (r) = Recorded By, (t) = Taken By, (c) = Cosigned By    Initials Name Provider Type    Jenn Jon, PT Physical Therapist                             Physical Therapy Education                 Title: PT OT SLP Therapies (Done)     Topic: Physical Therapy (Done)     Point: Mobility training (Done)     Learning Progress Summary           Patient Acceptance, ALFREDA ECHAVARRIA by AR at 1/11/2022 1645    Acceptance, TB, QUENTIN by DEAN at 1/5/2022 1547                   Point: Home exercise program (Done)      Learning Progress Summary           Patient Acceptance, E, VU by AR at 1/11/2022 1645    Acceptance, TB, DU by DJ at 1/5/2022 1547                   Point: Body mechanics (Done)     Learning Progress Summary           Patient Acceptance, E, VU by AR at 1/11/2022 1645    Acceptance, TB, DU by DJ at 1/5/2022 1547                   Point: Precautions (Done)     Learning Progress Summary           Patient Acceptance, E, VU by AR at 1/11/2022 1645    Acceptance, TB, DU by DJ at 1/5/2022 1547                               User Key     Initials Effective Dates Name Provider Type Discipline    AR 06/16/21 -  Jenn Samson, PT Physical Therapist PT    DJ 10/25/19 -  Marilu Hutchinson PT Physical Therapist PT              PT Recommendation and Plan     Plan of Care Reviewed With: patient  Outcome Summary: Limited progress towards goals during PT today due to 02 saturations once sitting up.  SP02 to 79%.  Pt able to move supine<>sit with stand by assist, no safety concerns. SP02 returned to high 80s on 15L NRB and optiflow.     Time Calculation:    PT Charges     Row Name 01/11/22 1635             Time Calculation    Start Time 1609  -AR      Stop Time 1636  -AR      Time Calculation (min) 27 min  -AR      PT Received On 01/11/22  -AR      PT - Next Appointment 01/14/22  -AR            User Key  (r) = Recorded By, (t) = Taken By, (c) = Cosigned By    Initials Name Provider Type    AR Jenn Samson, PT Physical Therapist              Therapy Charges for Today     Code Description Service Date Service Provider Modifiers Qty    96620540575 HC PT THERAPEUTIC ACT EA 15 MIN 1/11/2022 Jenn Samson, PT GP 2          PT G-Codes  Outcome Measure Options: AM-PAC 6 Clicks Basic Mobility (PT)  AM-PAC 6 Clicks Score (PT): 16    Jenn Samson PT  1/11/2022

## 2022-01-11 NOTE — PROGRESS NOTES
Adult Nutrition  Assessment/PES    Patient Name:  Renaldo Rodriguez  YOB: 1965  MRN: [id number]  Admit Date:  1/2/2022    Assessment Date:  1/11/2022    Comments:  Follow up note. Pt now in CCU for increase oxygen needs. Pt on Regular diet with decrease po intake. Po encouraged. Will offer Boost Breeze. Last BM 1/4 on laxative. Will discuss care in rounds and continue to follow.      Reason for Assessment     Row Name 01/11/22 0936          Reason for Assessment    Reason For Assessment follow-up protocol                Nutrition/Diet History     Row Name 01/11/22 0936          Nutrition/Diet History    Typical Food/Fluid Intake Poor appetite but drinking water well, 25% po                Anthropometrics     Row Name 01/11/22 0600          Anthropometrics    Weight 97.9 kg (215 lb 13.3 oz)                Labs/Tests/Procedures/Meds     Row Name 01/11/22 0936          Labs/Procedures/Meds    Lab Results Reviewed reviewed     Lab Results Comments glu, bun, mg, alt, alb, crp            Diagnostic Tests/Procedures    Diagnostic Test/Procedure Reviewed reviewed            Medications    Pertinent Medications Reviewed reviewed     Pertinent Medications Comments Vit D, decadron, lovenox, pepcid, lasix, miralax, pericolace                Physical Findings     Row Name 01/11/22 0938          Physical Findings    Overall Physical Appearance obese; on oxygen therapy                  Nutrition Prescription Ordered     Row Name 01/11/22 0938          Nutrition Prescription PO    Current PO Diet Regular                       Problem/Interventions:     Problem 2     Row Name 01/11/22 0939          Nutrition Diagnoses Problem 2    Problem 2 Inadequate Nutrient Intake     Etiology (related to) Medical Diagnosis  COVID 19 infection                    Intervention Goal     Row Name 01/11/22 0939          Intervention Goal    General Maintain nutrition; Disease management/therapy; Reduce/improve symptoms     PO Tolerate  PO; Increase intake; PO intake (%)     PO Intake % 75 %     Weight No significant weight loss                Nutrition Intervention     Row Name 01/11/22 0940          Nutrition Intervention    RD/Tech Action Follow Tx progress; Care plan reviewd; Encourage intake                Nutrition Prescription     Row Name 01/11/22 0940          Nutrition Prescription PO    PO Prescription Begin/change supplement     Supplement Boost Breeze                Education/Evaluation     Row Name 01/11/22 0940          Monitor/Evaluation    Monitor Per protocol; PO intake; Supplement intake; Pertinent labs; Weight; Skin status                 Electronically signed by:  Rama Klein RD  01/11/22 09:40 EST

## 2022-01-11 NOTE — PLAN OF CARE
Goal Outcome Evaluation:  Plan of Care Reviewed With: patient        Progress: no change  Pt remains in CCU.  A&O.  Appears very worn out today.  BP and HR stable.  Oxygen saturation 86-90% on opti-flow plus nonrebreather.  Sat on side of bed with PT and desat'd to low 80s; placed on bipap.  PRN Xanax.  Poor appetite.  Using urinal.  No BM.    Called Dr. Ward Ledesma's office.  This is the orthopedist who performed patient's bicep repair prior to this admission.  Missed follow-up appointment; still has surgical dressing/splint in place.  [facility] staff waiting to hear back on proper post-op care and if we should remove the dressing/splint.

## 2022-01-11 NOTE — PROGRESS NOTES
Pernell Alcantar MD                          [phone]      Patient ID:    Name:  Renaldo Rodriguez    MRN:  [id number]    1965   56 y.o.  male            Patient Care Team:  Provider, No Known as PCP - General    CC/ Reason for visit: Acute respiratory failure, acute COVID-19 pneumonia, unvaccinated patient, DVT/PE    Subjective: Pt seen and examined this AM.  Patient continues to need noninvasive ventilator.  Able to take longer breaks now.  No new fevers.    Feels like he is getting little better    ROS: Unable to obtain more than about due to severe respiratory failure    Objective     Vital Signs past 24hrs    BP range: BP: (106-126)/(68-95) 121/82  Pulse range: Heart Rate:  [57-81] 77  Resp rate range: Resp:  [19-30] 24  Temp range: Temp (24hrs), Av.1 °F (36.7 °C), Min:97.8 °F (36.6 °C), Max:98.2 °F (36.8 °C)      Ventilator/Non-Invasive Ventilation Settings (From admission, onward)            None          Device (Oxygen Therapy): humidified; high-flow nasal cannula; heated       97.9 kg (215 lb 13.3 oz); Body mass index is 31.97 kg/m².      Intake/Output Summary (Last 24 hours) at 2022 1412  Last data filed at 2022 1158  Gross per 24 hour   Intake 1010 ml   Output 1375 ml   Net -365 ml       PHYSICAL EXAM   Constitutional: Middle aged pt in bed, morbidly obese white male in mild respiratory distress  Head: - NCAT  Eyes: No pallor.  Anicteric sclerae, EOMI.  ENMT:  Mallampati 4, no oral thrush. Moist MM.   NECK: Trachea midline, No thyromegaly, no palpable cervical lymphadenopathy  Heart: RRR, no murmur. Trace bilateral pedal edema   Lungs: RENATO +, decreased breath sounds at the bases full no wheezes/ crackles heard    Abdomen: Soft. Obese. No tenderness, guarding or rigidity. No palpable masses  Extremities: Extremities warm and well perfused. No cyanosis/ clubbing  Neuro: Conscious, answers appropriately, no gross focal neuro deficits  Psych: Mood and  affect -anxious    PPE recommended per [facility] infectious disease Isolation protocol for the current clinical scenario(as mentioned below) was followed.     Scheduled meds:  cholecalciferol, 1,000 Units, Oral, Daily  dexamethasone, 10 mg, Oral, Q12H  enoxaparin, 1 mg/kg, Subcutaneous, Q12H  famotidine, 20 mg, Oral, BID  furosemide, 40 mg, Intravenous, Daily  ipratropium-albuterol, 3 mL, Nebulization, 4x Daily - RT  polyethylene glycol, 17 g, Oral, Daily  pravastatin, 10 mg, Oral, Nightly  senna-docusate sodium, 2 tablet, Oral, BID  sodium chloride, 10 mL, Intravenous, Q12H        IV meds:                           Data Review:      Results from last 7 days   Lab Units 01/11/22  0611 01/10/22  0647 01/09/22  0559 01/08/22  0638 01/07/22  0755   SODIUM mmol/L 137 136 140   < > 137   POTASSIUM mmol/L 4.4 4.7 4.9   < > 4.4   CHLORIDE mmol/L 100 103 105   < > 105   CO2 mmol/L 23.8 22.5 21.6*   < > 21.8*   BUN mg/dL 36* 34* 30*   < > 20   CREATININE mg/dL 0.89 0.93 0.89   < > 0.72*   CALCIUM mg/dL 8.7 8.7 8.4*   < > 7.9*   BILIRUBIN mg/dL 0.9 0.9 0.9   < > 0.6   ALK PHOS U/L 82 72 73   < > 68   ALT (SGPT) U/L 45* 40 47*   < > 42*   AST (SGOT) U/L 32 26 31   < > 36   GLUCOSE mg/dL 126* 142* 137*   < > 115*   WBC 10*3/mm3 16.66* 14.21* 11.90*   < > 11.09*   HEMOGLOBIN g/dL 13.6 13.1 13.1   < > 11.9*   PLATELETS 10*3/mm3 419 392 380   < > 418   PROCALCITONIN ng/mL  --   --   --   --  0.49*    < > = values in this interval not displayed.       Lab Results   Component Value Date    CALCIUM 8.7 01/11/2022    PHOS 4.1 01/11/2022       Results from last 7 days   Lab Units 01/04/22  1552   RESPCX  Light growth (2+) Normal respiratory jana. No S. aureus or Pseudomonas aeruginosa detected. Final report.       COVID LABS:  Results From Last 14 Days   Lab Units 01/11/22  0611 01/10/22  0647 01/09/22  0559 01/08/22  0638 01/07/22  0755 01/05/22  0623 01/04/22  0908 01/03/22  1340 01/03/22  0207 01/02/22  2019 01/02/22 2019    PROBNP pg/mL  --   --   --   --   --   --   --   --   --   --  355.9   CK TOTAL U/L 64 50 84   < > 230*   < > 844*  --   --    < > 1,229*   CRP mg/dL 4.02* 8.14* 12.78*   < > 7.35*   < > 12.47*  --   --    < > 26.49*  25.13*   D DIMER QUANT MCGFEU/mL 3.60*  --  12.26*  --  >20.00*   < >  --   --  2.26*   < > 2.50*   FERRITIN ng/mL 3,805.00* 3,970.00* 3,565.00*   < > 1,778.00*   < > 2,938.00*  --   --    < > 3,621.00*   LACTATE mmol/L  --   --   --   --   --   --   --   --  1.6  --   --    LDH U/L 625*  --  614*  --  665*   < >  --   --   --   --  849*   PROCALCITONIN ng/mL  --   --   --   --  0.49*  --  5.86*  --   --   --  2.38*   TROPONIN T ng/mL  --   --   --   --   --   --  <0.010 <0.010 <0.010   < > <0.010   TRIGLYCERIDES mg/dL  --   --   --   --   --   --  140  --   --   --   --     < > = values in this interval not displayed.            Results Review:    I have reviewed the relevant laboratory results and independently reviewed the chest imaging from this hospitalization including the available echocardiogram reports personally and summarized it if/ when appropriate below    Assessment    Acute hypoxic respiratory failure s/p HHFNC/ NRB  Noninvasive ventilator use  Acute COVID-19 pneumonia - unvaccinated patient  Acute pulmonary embolism on full AC  Secondary bacterial pneumonia on antibiotics  Cytokine release syndrome s/p remdesivir  Previous biceps surgery  JOSHUA - Noncompliant with PAP  Morbid obesity    PLAN:  Patient continues to be on noninvasive ventilator - able to take more breaks now. rec'd to use with naps and night without fail.   C/w steroids and anticoagulation for DVT/PE.    Inflammatory markers are turning around finally.  Patient already on antibiotic course for secondary pneumonia   We will use as needed diuretics to keep him as negative as possible  Full code    DVT/gi prophylaxis    Very guarded prognosis. High risk for need for intubation mechanical ventilation.    D/w RN    CC - 32  melonie Alcantar MD  1/11/2022

## 2022-01-11 NOTE — CASE MANAGEMENT/SOCIAL WORK
Continued Stay Note  [facility]     Patient Name: Renaldo Rodriguez  MRN: [id number]  Today's Date: 1/11/2022    Admit Date: 1/2/2022     Discharge Plan     Row Name 01/11/22 0805       Plan    Plan Discharge plans pending clinical course.    Plan Comments CCP following for discharge needs as the patient's clinical course evolves.  Pt is on Bi PAP  Supportive care for Covid 19.  Plan is home               Discharge Codes    No documentation.               Expected Discharge Date and Time     Expected Discharge Date Expected Discharge Time    Jan 11, 2022             Litzy Falk RN

## 2022-01-11 NOTE — PLAN OF CARE
Goal Outcome Evaluation:  Plan of Care Reviewed With: patient           Outcome Summary: Limited progress towards goals during PT today due to 02 saturations once sitting up.  SP02 to 79%.  Pt able to move supine<>sit with stand by assist, no safety concerns. SP02 returned to high 80s on 15L NRB and optiflow.

## 2022-01-12 NOTE — PLAN OF CARE
Problem: Adult Inpatient Plan of Care  Goal: Absence of Hospital-Acquired Illness or Injury  Outcome: Met  Intervention: Identify and Manage Fall Risk  Recent Flowsheet Documentation  Taken 1/12/2022 0400 by Chapis Mak RN  Safety Promotion/Fall Prevention:   assistive device/personal items within reach   clutter free environment maintained   room organization consistent   safety round/check completed  Taken 1/12/2022 0300 by Chapis Mak RN  Safety Promotion/Fall Prevention:   assistive device/personal items within reach   clutter free environment maintained   room organization consistent   safety round/check completed  Taken 1/12/2022 0200 by Chapis Mak RN  Safety Promotion/Fall Prevention:   assistive device/personal items within reach   clutter free environment maintained   room organization consistent   safety round/check completed  Taken 1/12/2022 0100 by Chapis Mak RN  Safety Promotion/Fall Prevention:   assistive device/personal items within reach   clutter free environment maintained   room organization consistent   safety round/check completed  Taken 1/12/2022 0000 by Chapis Mak RN  Safety Promotion/Fall Prevention:   assistive device/personal items within reach   clutter free environment maintained   room organization consistent   safety round/check completed  Taken 1/11/2022 2300 by Chapis Mak RN  Safety Promotion/Fall Prevention:   assistive device/personal items within reach   clutter free environment maintained   room organization consistent   safety round/check completed  Taken 1/11/2022 2200 by Chapis Mak RN  Safety Promotion/Fall Prevention:   assistive device/personal items within reach   clutter free environment maintained   room organization consistent   safety round/check completed  Taken 1/11/2022 2100 by Chapis Mak RN  Safety Promotion/Fall Prevention:   assistive device/personal items within reach   clutter free environment maintained   room  organization consistent   safety round/check completed  Taken 1/11/2022 2000 by Chapis Mak RN  Safety Promotion/Fall Prevention:   assistive device/personal items within reach   clutter free environment maintained   room organization consistent   safety round/check completed  Intervention: Prevent Skin Injury  Recent Flowsheet Documentation  Taken 1/12/2022 0400 by Chapis Mak RN  Body Position:   position changed independently   upper extremity elevated, right  Taken 1/12/2022 0300 by Chapis Mak RN  Body Position:   position changed independently   upper extremity elevated, right  Taken 1/12/2022 0200 by Chapis Mak RN  Body Position:   position changed independently   upper extremity elevated, right  Taken 1/12/2022 0100 by Chapis Mak RN  Body Position:   position changed independently   upper extremity elevated, right  Taken 1/12/2022 0000 by Chapis Mak RN  Body Position:   position changed independently   upper extremity elevated, right  Skin Protection:   adhesive use limited   incontinence pads utilized   skin-to-device areas padded   tubing/devices free from skin contact  Taken 1/11/2022 2300 by Chapis Mak RN  Body Position:   position changed independently   upper extremity elevated, right  Taken 1/11/2022 2200 by Chapis Mak RN  Body Position:   position changed independently   upper extremity elevated, right  Taken 1/11/2022 2100 by Chapis Mak RN  Body Position: position changed independently  Taken 1/11/2022 2000 by Chapis Mak RN  Body Position:   position changed independently   upper extremity elevated, right  Skin Protection:   adhesive use limited   incontinence pads utilized   skin-to-device areas padded   tubing/devices free from skin contact  Intervention: Prevent and Manage VTE (venous thromboembolism) Risk  Recent Flowsheet Documentation  Taken 1/12/2022 0000 by Chapis Mak RN  VTE Prevention/Management: (lovenox)   bilateral    dorsiflexion/plantar flexion performed   bleeding risk factor(s) identified   other (see comments)  Taken 1/11/2022 2000 by Chapis Mak RN  VTE Prevention/Management: (lovenox)   bilateral   dorsiflexion/plantar flexion performed   bleeding risk factor(s) identified   other (see comments)  Intervention: Prevent Infection  Recent Flowsheet Documentation  Taken 1/12/2022 0400 by Chapis Mak RN  Infection Prevention:   personal protective equipment utilized   rest/sleep promoted   single patient room provided  Taken 1/12/2022 0300 by Chapis Mak RN  Infection Prevention:   personal protective equipment utilized   rest/sleep promoted   single patient room provided  Taken 1/12/2022 0200 by Chapis Mak RN  Infection Prevention:   personal protective equipment utilized   rest/sleep promoted   single patient room provided  Taken 1/12/2022 0100 by Chapis Mak RN  Infection Prevention:   personal protective equipment utilized   rest/sleep promoted   single patient room provided  Taken 1/12/2022 0000 by Chapis Mak RN  Infection Prevention:   personal protective equipment utilized   rest/sleep promoted   single patient room provided  Taken 1/11/2022 2300 by Chapis Mak RN  Infection Prevention:   personal protective equipment utilized   rest/sleep promoted   single patient room provided  Taken 1/11/2022 2200 by Chapis Mak RN  Infection Prevention:   personal protective equipment utilized   rest/sleep promoted   single patient room provided  Taken 1/11/2022 2100 by Chapis Mak RN  Infection Prevention:   personal protective equipment utilized   rest/sleep promoted   single patient room provided  Taken 1/11/2022 2000 by Chapis Mak RN  Infection Prevention:   environmental surveillance performed   equipment surfaces disinfected   hand hygiene promoted   personal protective equipment utilized   rest/sleep promoted   single patient room provided   visitors restricted/screened  Goal: Optimal  Comfort and Wellbeing  Outcome: Met  Intervention: Provide Person-Centered Care  Recent Flowsheet Documentation  Taken 1/12/2022 0400 by Chapis Mak RN  Trust Relationship/Rapport:   care explained   choices provided   questions answered   questions encouraged   thoughts/feelings acknowledged  Taken 1/12/2022 0000 by Chapis Mak RN  Trust Relationship/Rapport:   care explained   choices provided   questions answered   questions encouraged   thoughts/feelings acknowledged  Taken 1/11/2022 2000 by Chapis Mak RN  Trust Relationship/Rapport:   care explained   choices provided   questions answered   questions encouraged   reassurance provided   thoughts/feelings acknowledged     Problem: Fall Injury Risk  Goal: Absence of Fall and Fall-Related Injury  Outcome: Met  Intervention: Identify and Manage Contributors to Fall Injury Risk  Recent Flowsheet Documentation  Taken 1/12/2022 0400 by Chapis Mak RN  Medication Review/Management:   medications reviewed   high-risk medications identified  Taken 1/12/2022 0300 by Chapis Mak RN  Medication Review/Management:   medications reviewed   high-risk medications identified  Taken 1/12/2022 0200 by Chapis Mak RN  Medication Review/Management: medications reviewed  Taken 1/12/2022 0100 by Chapis Mak RN  Medication Review/Management: medications reviewed  Taken 1/12/2022 0000 by Chapis Mak RN  Medication Review/Management: medications reviewed  Taken 1/11/2022 2300 by Chapis Mak RN  Medication Review/Management: medications reviewed  Taken 1/11/2022 2200 by Chapis Mak RN  Medication Review/Management: medications reviewed  Taken 1/11/2022 2100 by Chapis Mak RN  Medication Review/Management: medications reviewed  Taken 1/11/2022 2000 by Chapis Mak RN  Medication Review/Management: medications reviewed  Intervention: Promote Injury-Free Environment  Recent Flowsheet Documentation  Taken 1/12/2022 0400 by Chapis Mak  RN  Safety Promotion/Fall Prevention:   assistive device/personal items within reach   clutter free environment maintained   room organization consistent   safety round/check completed  Taken 1/12/2022 0300 by Chapis Mak RN  Safety Promotion/Fall Prevention:   assistive device/personal items within reach   clutter free environment maintained   room organization consistent   safety round/check completed  Taken 1/12/2022 0200 by Chapis Mak RN  Safety Promotion/Fall Prevention:   assistive device/personal items within reach   clutter free environment maintained   room organization consistent   safety round/check completed  Taken 1/12/2022 0100 by Chapis Mak RN  Safety Promotion/Fall Prevention:   assistive device/personal items within reach   clutter free environment maintained   room organization consistent   safety round/check completed  Taken 1/12/2022 0000 by Chapis Mak RN  Safety Promotion/Fall Prevention:   assistive device/personal items within reach   clutter free environment maintained   room organization consistent   safety round/check completed  Taken 1/11/2022 2300 by Chapis Mak RN  Safety Promotion/Fall Prevention:   assistive device/personal items within reach   clutter free environment maintained   room organization consistent   safety round/check completed  Taken 1/11/2022 2200 by Chapis Mak RN  Safety Promotion/Fall Prevention:   assistive device/personal items within reach   clutter free environment maintained   room organization consistent   safety round/check completed  Taken 1/11/2022 2100 by Chapis Mak RN  Safety Promotion/Fall Prevention:   assistive device/personal items within reach   clutter free environment maintained   room organization consistent   safety round/check completed  Taken 1/11/2022 2000 by Chapis Mak RN  Safety Promotion/Fall Prevention:   assistive device/personal items within reach   clutter free environment maintained   room  organization consistent   safety round/check completed     Problem: Breathing Pattern Ineffective  Goal: Effective Breathing Pattern  Outcome: Met  Intervention: Promote Improved Breathing Pattern  Recent Flowsheet Documentation  Taken 1/12/2022 0400 by Chapis Mak RN  Head of Bed (HOB): HOB at 30-45 degrees  Taken 1/12/2022 0300 by Chapis Mak RN  Head of Bed (HOB): HOB at 30-45 degrees  Taken 1/12/2022 0200 by Chapis Mak RN  Head of Bed (HOB): HOB at 30-45 degrees  Taken 1/12/2022 0100 by Chapis Mak RN  Head of Bed (HOB): HOB at 30-45 degrees  Taken 1/12/2022 0000 by Chapis Mak RN  Supportive Measures: active listening utilized  Head of Bed (HOB): HOB at 30-45 degrees  Airway/Ventilation Management:   airway patency maintained   calming measures promoted   pulmonary hygiene promoted  Breathing Techniques/Airway Clearance:   deep/controlled cough encouraged   diaphragmatic breathing promoted  Taken 1/11/2022 2300 by Chapis Mak RN  Head of Bed (HOB): HOB at 30-45 degrees  Taken 1/11/2022 2200 by Chapis Mak RN  Head of Bed (HOB): HOB at 30-45 degrees  Taken 1/11/2022 2100 by Chapis Mak RN  Head of Bed (HOB): HOB at 30-45 degrees  Taken 1/11/2022 2000 by Chapis Mak RN  Supportive Measures: active listening utilized  Head of Bed (HOB): HOB at 30-45 degrees  Airway/Ventilation Management:   airway patency maintained   calming measures promoted   pulmonary hygiene promoted  Breathing Techniques/Airway Clearance:   deep/controlled cough encouraged   diaphragmatic breathing promoted     Problem: Skin Injury Risk Increased  Goal: Skin Health and Integrity  Outcome: Met  Intervention: Optimize Skin Protection  Recent Flowsheet Documentation  Taken 1/12/2022 0400 by Chapis Mak RN  Head of Bed (HOB): HOB at 30-45 degrees  Taken 1/12/2022 0300 by Chapis Mak RN  Head of Bed (HOB): HOB at 30-45 degrees  Taken 1/12/2022 0200 by Chapis Mak RN  Head of Bed (HOB): HOB at 30-45  degrees  Taken 1/12/2022 0100 by Chapis Mak RN  Head of Bed (HOB): HOB at 30-45 degrees  Taken 1/12/2022 0000 by Chapis Mak RN  Pressure Reduction Techniques:   frequent weight shift encouraged   pressure points protected   weight shift assistance provided  Head of Bed (HOB): HOB at 30-45 degrees  Pressure Reduction Devices: specialty bed utilized  Skin Protection:   adhesive use limited   incontinence pads utilized   skin-to-device areas padded   tubing/devices free from skin contact  Taken 1/11/2022 2300 by Chapis Mak RN  Head of Bed (HOB): HOB at 30-45 degrees  Taken 1/11/2022 2200 by Chapis Mak RN  Head of Bed (HOB): HOB at 30-45 degrees  Taken 1/11/2022 2100 by Chapis Mak RN  Head of Bed (HOB): HOB at 30-45 degrees  Taken 1/11/2022 2000 by Chapis Mak RN  Pressure Reduction Techniques:   frequent weight shift encouraged   pressure points protected   weight shift assistance provided  Head of Bed (HOB): HOB at 30-45 degrees  Pressure Reduction Devices: specialty bed utilized  Skin Protection:   adhesive use limited   incontinence pads utilized   skin-to-device areas padded   tubing/devices free from skin contact     Problem: Pain Acute  Goal: Optimal Pain Control  Outcome: Met  Intervention: Develop Pain Management Plan  Recent Flowsheet Documentation  Taken 1/12/2022 0400 by Chapis Mak RN  Pain Management Interventions:   care clustered   pillow support provided   quiet environment facilitated   see MAR  Taken 1/12/2022 0000 by Chapis Mak RN  Pain Management Interventions:   care clustered   quiet environment facilitated   see MAR   pillow support provided  Taken 1/11/2022 2000 by Chapis Mak RN  Pain Management Interventions:   care clustered   pillow support provided   quiet environment facilitated   relaxation techniques promoted  Intervention: Prevent or Manage Pain  Recent Flowsheet Documentation  Taken 1/12/2022 0400 by Chapis Mak RN  Sensory Stimulation  Regulation:   auditory stimulation minimized   care clustered   quiet environment promoted  Sleep/Rest Enhancement:   awakenings minimized   consistent schedule promoted   noise level reduced   regular sleep/rest pattern promoted   relaxation techniques promoted   room darkened  Taken 1/12/2022 0000 by Chapis Mak RN  Sensory Stimulation Regulation:   care clustered   auditory stimulation minimized   quiet environment promoted  Taken 1/11/2022 2000 by Chapis Mak RN  Sensory Stimulation Regulation:   care clustered   quiet environment promoted  Intervention: Optimize Psychosocial Wellbeing  Recent Flowsheet Documentation  Taken 1/12/2022 0000 by Chapis Mak RN  Supportive Measures: active listening utilized  Taken 1/11/2022 2000 by Chapis Mak RN  Supportive Measures: active listening utilized  Diversional Activities: smartphone     Problem: Noninvasive Ventilation Acute  Goal: Effective Unassisted Ventilation and Oxygenation  Outcome: Met  Intervention: Monitor and Manage Noninvasive Ventilation  Recent Flowsheet Documentation  Taken 1/12/2022 0000 by Chapis Mak RN  Airway/Ventilation Management:   airway patency maintained   calming measures promoted   pulmonary hygiene promoted  NPPV/CPAP Maintenance:   mask adjusted   mask secure   tubes secured  Taken 1/11/2022 2000 by Chapis Mak RN  Airway/Ventilation Management:   airway patency maintained   calming measures promoted   pulmonary hygiene promoted   Goal Outcome Evaluation:                 Problem: Adult Inpatient Plan of Care  Goal: Plan of Care Review  1/12/2022 0546 by Chapis Mak RN  Outcome: Unable to Meet, Plan Revised  1/12/2022 0545 by Chapis Mak RN  Outcome: Ongoing, Not Progressing  Goal: Patient-Specific Goal (Individualized)  Outcome: Unable to Meet, Plan Revised  Goal: Readiness for Transition of Care  Outcome: Unable to Meet, Plan Revised

## 2022-01-12 NOTE — PAYOR COMM NOTE
"Cecil Rodriguez (56 y.o. Male)                  ATTENTION CONTINUED STAY CLINICALS   AUTH#[id number]               LEVEL OF CARE , CRITICAL CARE / CORONARY CARE UNIT . REPLY TO SHAE RAWLST , NATHAN SAAB LPN  [phone] OR CALL                Date of Birth Social Security Number Address Home Phone MRN    1965  [id number] [id number] [phone] [id number]    Gnosticist Marital Status             Adventism        Admission Date Admission Type Admitting Provider Attending Provider Department, Room/Bed    1/2/22 Emergency John Maher MD Snyder, Perry, MD [facility] CORONARY CARE, N331/1    Discharge Date Discharge Disposition Discharge Destination                         Attending Provider: Giorgi Mann MD    Allergies: No Known Allergies    Isolation: Enh Drop/Con   Infection: COVID (confirmed) (01/01/22)   Code Status: CPR   Advance Care Planning Activity    Ht: 175 cm (68.9\")   Wt: 101 kg (222 lb 0.1 oz)    Admission Cmt: None   Principal Problem: Pneumonia due to COVID-19 virus [U07.1,J12.82]                 Active Insurance as of 1/2/2022     Primary Coverage     Payor Plan Insurance Group Employer/Plan Group    [employer]ERCIAL AETNA [id number]     Payor Plan Address Payor Plan Phone Number Payor Plan Fax Number Effective Dates    [address] [phone]  12/31/2021 - None Entered    [facility] [id number]       Subscriber Name Subscriber Birth Date Member ID       CECIL RODRIGUEZ 1965 [id number]                 Emergency Contacts      (Rel.) Home Phone Work Phone Mobile Phone    Ban Rodriguez (Spouse) [phone] -- --    Samson Rodriguez (Brother) -- -- [phone]            Vital Signs (last day)     Date/Time Temp Temp src Pulse Resp BP Patient Position SpO2    01/12/22 1150 -- -- 85 -- -- -- 79    01/12/22 0946 -- -- 63 32 -- -- 86    01/12/22 0825 -- -- 60 -- -- -- 87    01/12/22 " 0803 -- -- 53 41 -- -- 90 01/12/22 0630 -- -- 55 -- 117/87 -- 90 01/12/22 0600 -- -- 55 -- 117/90 -- 91    01/12/22 0530 -- -- 56 -- 112/81 -- 91    01/12/22 0500 99 (37.2) Axillary 56 -- 108/83 -- 93    01/12/22 0430 -- -- 57 39 106/81 -- 93    01/12/22 0400 -- -- 60 -- 109/82 -- 93    01/12/22 0330 -- -- 64 40 109/86 -- 91    01/12/22 0325 -- -- 65 -- -- -- 91    01/12/22 0300 -- -- 68 40 121/86 -- 88    01/12/22 0245 -- -- -- 45 -- -- --    01/12/22 0200 -- -- 75 50 121/89 -- 88    01/12/22 0100 -- -- 76 -- 161/103 -- 86    01/12/22 0000 -- -- 73 -- 132/89 -- 88    01/11/22 2342 -- -- 66 -- -- -- 90 01/11/22 2300 98.6 (37) Axillary 66 -- 117/83 -- 91    01/11/22 2200 -- -- 66 -- 118/79 -- 89    01/11/22 2100 -- -- 67 -- 118/83 -- 90 01/11/22 2000 -- -- 63 30 121/78 Lying 93    01/11/22 1930 98.6 (37) Oral -- -- -- -- --    01/11/22 1926 -- -- -- -- -- -- 90    01/11/22 1900 -- -- 71 -- 112/72 -- 87    01/11/22 1800 -- -- 70 -- 116/85 -- 92    01/11/22 1701 -- -- 70 29 -- -- 92 01/11/22 1700 -- -- 69 -- 121/83 -- 91    01/11/22 1640 -- -- -- -- -- -- 90    01/11/22 1631 -- -- 71 -- 125/85 -- --    01/11/22 1500 -- -- 72 -- -- -- 90    01/11/22 1400 -- -- 77 -- -- -- 90    01/11/22 1300 -- -- 79 -- -- -- 89    01/11/22 1200 -- -- 84 -- 137/77 -- 86    01/11/22 1155 -- -- 77 24 -- -- 90    01/11/22 1100 -- -- 80 -- -- -- 88    01/11/22 1000 -- -- 81 -- -- -- --    01/11/22 0918 -- -- 75 26 -- -- 85    01/11/22 0903 -- -- 70 26 -- -- 87    01/11/22 0800 -- -- 60 -- 121/82 -- 92    01/11/22 0730 98.2 (36.8) Axillary -- -- -- -- --    01/11/22 0700 -- -- 62 -- 120/87 -- 93    01/11/22 0600 -- -- 58 -- 126/89 -- 93    01/11/22 0500 -- -- 58 -- 117/84 -- 92 01/11/22 0430 -- -- 57 -- -- -- 92 01/11/22 0400 97.8 (36.6) Axillary 58 -- 108/85 -- 92    01/11/22 0332 -- -- 73 -- -- -- 89    01/11/22 0300 -- -- 58 -- 120/91 -- 90    01/11/22 0230 -- -- 62 -- -- -- 92    01/11/22 0200 -- -- 59 -- 120/86 --  92    01/11/22 0130 -- -- 64 -- -- -- 93    01/11/22 0100 -- -- 61 -- 122/91 -- 91    01/11/22 0030 -- -- 60 -- -- -- 91    01/11/22 0000 -- -- 61 -- 117/85 -- 90          Oxygen Therapy (last day)     Date/Time SpO2 Device (Oxygen Therapy) Flow (L/min) Oxygen Concentration (%) ETCO2 (mmHg)    01/12/22 1150 79 -- -- -- --    01/12/22 0946 86 -- -- -- --    01/12/22 0825 87 -- -- -- --    01/12/22 0803 90 NPPV/NIV -- 100 --    01/12/22 0630 90 -- -- -- --    01/12/22 0600 91 -- -- -- --    01/12/22 0530 91 -- -- -- --    01/12/22 0500 93 -- -- -- --    01/12/22 0430 93 -- -- -- --    01/12/22 0400 93 -- -- -- --    01/12/22 0330 91 -- -- -- --    01/12/22 0325 91 NPPV/NIV -- 100 --    01/12/22 0300 88 -- -- -- --    01/12/22 0200 88 -- -- -- --    01/12/22 0100 86 -- -- -- --    01/12/22 0000 88 NPPV/NIV -- 100 --    01/11/22 2342 90 heated; humidified; high-flow nasal cannula -- 100 --    01/11/22 2300 91 -- -- -- --    01/11/22 2200 89 -- -- -- --    01/11/22 2100 90 -- -- -- --    01/11/22 2000 93 heated; high-flow nasal cannula; nasal cannula; nonrebreather mask 60 100 --    01/11/22 1926 90 -- -- -- --    01/11/22 1900 87 -- -- -- --    01/11/22 1832 -- heated; humidified; high-flow nasal cannula; nonrebreather mask 60 100 --    01/11/22 1800 92 -- -- -- --    01/11/22 1701 92 NPPV/NIV -- 100 --    01/11/22 1700 91 -- -- -- --    01/11/22 1640 90 -- -- -- --    01/11/22 1630 -- NPPV/NIV -- -- --    01/11/22 1500 90 -- -- -- --    01/11/22 1400 90 -- -- -- --    01/11/22 1300 89 -- -- -- --    01/11/22 1200 86 heated; humidified; high-flow nasal cannula; nonrebreather mask 60 100 --    01/11/22 1155 90 humidified; high-flow nasal cannula; heated -- -- --    01/11/22 1100 88 -- -- -- --    01/11/22 0918 85 -- -- -- --    01/11/22 0903 87 heated; high-flow nasal cannula; humidified 60 100 --    01/11/22 0845 -- heated; humidified; high-flow nasal cannula; nasal cannula 60 100 --    01/11/22 0800 92 -- -- -- --     01/11/22 0730 -- NPPV/NIV -- -- --    01/11/22 0700 93 -- -- -- --    01/11/22 0600 93 -- -- -- --    01/11/22 0500 92 -- -- -- --    01/11/22 0430 92 -- -- -- --    01/11/22 0400 92 NPPV/NIV -- 100 --    01/11/22 0332 89 NPPV/NIV -- 100 --    01/11/22 0300 90 -- -- -- --    01/11/22 0230 92 -- -- -- --    01/11/22 0200 92 -- -- -- --    01/11/22 0130 93 -- -- -- --    01/11/22 0100 91 -- -- -- --    01/11/22 0030 91 -- -- -- --    01/11/22 0000 90 NPPV/NIV -- 100 --          Lines, Drains & Airways     Active LDAs     Name Placement date Placement time Site Days    Peripheral IV 01/07/22 0110 Left; Upper Arm 01/07/22  0110  Arm  5    Peripheral IV 01/07/22 1416 Left; Anterior Forearm 01/07/22  1416  Forearm  4                Orders (last 24 hrs)      Start     Ordered    01/12/22 2100  dexamethasone (DECADRON) tablet 6 mg  Every 12 Hours Scheduled         01/12/22 1000    01/12/22 1315  rocuronium (ZEMURON) injection 100 mg  Once         01/12/22 1222    01/12/22 1315  Propofol (DIPRIVAN) injection 100 mg  Once         01/12/22 1224    01/12/22 1315  fentaNYL citrate (PF) (SUBLIMAZE) injection 150 mcg  Once         01/12/22 1224    01/12/22 1230  propofol (DIPRIVAN) infusion 10 mg/mL 100 mL  Titrated         01/12/22 1131    01/12/22 1230  fentaNYL citrate (PF) (SUBLIMAZE) injection 300 mcg  Once,   Status:  Discontinued         01/12/22 1136    01/12/22 1230  Propofol (DIPRIVAN) injection 200 mg  Once,   Status:  Discontinued         01/12/22 1136    01/12/22 1230  fentaNYL citrate (PF) (SUBLIMAZE) injection 200 mcg  Once,   Status:  Discontinued         01/12/22 1144    01/12/22 1001  XR Chest 1 View  1 Time Imaging         01/12/22 1000    01/12/22 0634  Manual Differential  Once         01/12/22 0633    01/12/22 0600  CBC Auto Differential  PROCEDURE ONCE         01/11/22 2202    01/12/22 0600  Bilirubin, Direct  PROCEDURE ONCE         01/11/22 2202    01/12/22 0315  dexmedetomidine (PRECEDEX) 400 mcg in 100 mL  NS infusion  Titrated         01/12/22 0217    01/12/22 0215  LORazepam (ATIVAN) injection 1 mg  Once         01/12/22 0127    01/11/22 1200  Dietary Nutrition Supplements Boost Breeze (Ensure Clear)  Daily With Breakfast & Lunch       01/11/22 0934    01/10/22 2100  famotidine (PEPCID) tablet 20 mg  2 Times Daily         01/10/22 1253    01/10/22 1330  sennosides-docusate (PERICOLACE) 8.6-50 MG per tablet 2 tablet  2 Times Daily         01/10/22 1235    01/10/22 1330  polyethylene glycol (MIRALAX) packet 17 g  Daily         01/10/22 1235    01/09/22 1315  furosemide (LASIX) injection 40 mg  Daily         01/09/22 1229    01/08/22 2100  dexamethasone (DECADRON) tablet 10 mg  Every 12 Hours Scheduled,   Status:  Discontinued         01/08/22 1117    01/08/22 1230  ipratropium-albuterol (DUO-NEB) nebulizer solution 3 mL  4 Times Daily - RT         01/08/22 1117    01/08/22 1200  enoxaparin (LOVENOX) syringe 110 mg  Every 12 Hours         01/07/22 2353    01/08/22 0900  sodium chloride 0.9 % flush 10 mL  Every 12 Hours Scheduled         01/08/22 0531    01/08/22 0600  Vital Signs Every Hour and Per Hospital Policy Based on Patient Condition  Every Hour       01/08/22 0531    01/08/22 0600  Intake and Output  Every Hour       01/08/22 0531    01/08/22 0600  Incentive Spirometry  Every 2 Hours While Awake       01/08/22 0531    01/08/22 0532  Daily Weights  Daily       01/08/22 0531    01/08/22 0532  Basic Metabolic Panel  Daily       01/08/22 0531    01/08/22 0532  CBC & Differential  Daily       01/08/22 0531    01/08/22 0532  Magnesium  Daily       01/08/22 0531    01/08/22 0532  Phosphorus  Daily       01/08/22 0531    01/08/22 0519  Oral care for patients not on NPPV or intubated  Every Shift      Comments: Oral care for patients not on NPPV or intubated    01/08/22 0531    01/08/22 0518  sodium chloride 0.9 % flush 10 mL  As Needed         01/08/22 0531    01/07/22 1317  ALPRAZolam (XANAX) tablet 0.5 mg  3 Times  "Daily PRN         01/07/22 1317    01/04/22 2100  pravastatin (PRAVACHOL) tablet 10 mg  Nightly         01/04/22 1036    01/03/22 1630  Oscillating Positive Expiratory Pressure (OPEP)  4 Times Daily - RT       01/03/22 1241    01/03/22 1000  cholecalciferol (VITAMIN D3) tablet 1,000 Units  Daily         01/03/22 0855    01/03/22 0600  Incentive Spirometry  Every 4 Hours While Awake       01/02/22 2236 01/03/22 0600  CBC Auto Differential  Daily       01/02/22 2236 01/03/22 0600  CK  Daily       01/02/22 2236 01/03/22 0600  Comprehensive Metabolic Panel  Daily       01/02/22 2236 01/03/22 0600  Ferritin  Daily       01/02/22 2236 01/03/22 0600  C-reactive Protein  Daily       01/02/22 2236 01/03/22 0600  Lactate Dehydrogenase  Every 48 Hours       01/02/22 2236 01/03/22 0600  D-dimer, Quantitative  Every 48 Hours       01/02/22 2236 01/03/22 0600  Fibrinogen  Every 48 Hours       01/02/22 2236 01/03/22 0000  Vital Signs  Every 4 Hours      Comments: Per per hospital policy    01/02/22 2236 01/02/22 2237  Hepatic Function Panel  Daily       01/02/22 2236 01/02/22 2237  Creatinine, Serum  Daily       01/02/22 2236 01/02/22 2236  Intake & Output  Every Shift       01/02/22 2236 01/02/22 2235  ondansetron (ZOFRAN) tablet 4 mg  Every 4 Hours PRN        \"Or\" Linked Group Details    01/02/22 2236 01/02/22 2235  ondansetron (ZOFRAN) injection 4 mg  Every 4 Hours PRN        \"Or\" Linked Group Details    01/02/22 2236 01/02/22 2235  melatonin tablet 3 mg  Nightly PRN         01/02/22 2236 01/02/22 2235  acetaminophen (TYLENOL) tablet 650 mg  Every 4 Hours PRN         01/02/22 2236 01/02/22 2235  nitroglycerin (NITROSTAT) SL tablet 0.4 mg  Every 5 Minutes PRN         01/02/22 2236 01/02/22 2015  sodium chloride 0.9 % flush 10 mL  As Needed         01/02/22 2017    Unscheduled  ECG 12 Lead  As Needed      Comments: Nurse to Release if Patient Expericences Acute Chest Pain or " Dysrhythmias    01/02/22 2236    Unscheduled  Potassium  As Needed      Comments: For Ventricular Arrhythmias      01/02/22 2236    Unscheduled  Magnesium  As Needed      Comments: For Ventricular Arrhythmias      01/02/22 2236    Unscheduled  Troponin  As Needed      Comments: For Chest Pain      01/02/22 2236    Unscheduled  Blood Gas, Arterial -  As Needed      Comments: Per O2 PolicyNotify Physician      01/02/22 2236    Unscheduled  ECG 12 Lead  As Needed      Comments: For ICU/CCU Protocol Orders.  Nurse to Release if Patient Experiences Acute Chest Pain or Dysrhythmias    01/08/22 0531    Unscheduled  Potassium  As Needed        Comments: Sudden Ventricular Dysrhythmias. Notify Physician.      01/08/22 0531    Unscheduled  Magnesium  As Needed        Comments: For ICU/CCU Protocol      01/08/22 0531    --  ivermectin (STROMECTOL) 3 MG tablet tablet  Once         01/02/22 2110    --  SCANNED - TELEMETRY           01/02/22 0000    --  SCANNED - TELEMETRY           01/02/22 0000    --  SCANNED - TELEMETRY           01/02/22 0000    --  SCANNED - TELEMETRY           01/02/22 0000    --  SCANNED - TELEMETRY           01/02/22 0000    --  SCANNED - TELEMETRY           01/02/22 0000    --  SCANNED - TELEMETRY           01/02/22 0000    --  SCANNED - TELEMETRY           01/02/22 0000                   Physician Progress Notes (last 24 hours)      Pernell Alcantar MD at 01/12/22 0954                                      Pernell Alcantar MD                          [phone]      Patient ID:    Name:  Renaldo Rodriguez    MRN:  [id number]    1965   56 y.o.  male            Patient Care Team:  Provider, No Known as PCP - General    CC/ Reason for visit: Acute respiratory failure, acute COVID-19 pneumonia, unvaccinated patient, DVT/PE    Subjective: Pt seen and examined this AM.  Patient continues to need noninvasive ventilator-now unable to take breaks.  No new fevers.  Having to start on Precedex  for agitation overnight.  Intermittent diuretics to keep him as negative as possible.  Spoke to patient's brother Dr. Rodriguez who is an ER physician in [place]    ROS: Unable to obtain more than about due to severe respiratory failure    Objective     Vital Signs past 24hrs    BP range: BP: (106-161)/() 117/87  Pulse range: Heart Rate:  [53-84] 63  Resp rate range: Resp:  [24-50] 32  Temp range: Temp (24hrs), Av.7 °F (37.1 °C), Min:98.6 °F (37 °C), Max:99 °F (37.2 °C)      Device (Oxygen Therapy): NPPV/NIV       101 kg (222 lb 0.1 oz); Body mass index is 32.88 kg/m².      Intake/Output Summary (Last 24 hours) at 2022 0954  Last data filed at 2022 0400  Gross per 24 hour   Intake 840 ml   Output 1000 ml   Net -160 ml       PHYSICAL EXAM   Constitutional: Middle aged pt in bed, morbidly obese white male in mild respiratory distress  Head: - NCAT  Eyes: No pallor.  Anicteric sclerae, EOMI.  ENMT:  Mallampati 4, no oral thrush. Moist MM.   NECK: Trachea midline, No thyromegaly, no palpable cervical lymphadenopathy  Heart: RRR, no murmur. Trace bilateral pedal edema   Lungs: RENATO +, decreased breath sounds at the bases full no wheezes/ crackles heard    Abdomen: Soft. Obese. No tenderness, guarding or rigidity. No palpable masses  Extremities: Extremities warm and well perfused. No cyanosis/ clubbing  Neuro: Conscious, answers appropriately, no gross focal neuro deficits  Psych: Mood and affect -anxious    PPE recommended per [facility] infectious disease Isolation protocol for the current clinical scenario(as mentioned below) was followed.     Scheduled meds:  cholecalciferol, 1,000 Units, Oral, Daily  dexamethasone, 10 mg, Oral, Q12H  enoxaparin, 1 mg/kg, Subcutaneous, Q12H  famotidine, 20 mg, Oral, BID  furosemide, 40 mg, Intravenous, Daily  ipratropium-albuterol, 3 mL, Nebulization, 4x Daily - RT  polyethylene glycol, 17 g, Oral, Daily  pravastatin, 10 mg, Oral, Nightly  senna-docusate sodium, 2  tablet, Oral, BID  sodium chloride, 10 mL, Intravenous, Q12H      IV meds:                       dexmedetomidine, 0.2-1.5 mcg/kg/hr, Last Rate: 0.3 mcg/kg/hr (01/12/22 0827)      Data Review:    Results from last 7 days   Lab Units 01/12/22  0539 01/11/22  0611 01/10/22  0647 01/08/22  0638 01/07/22  0755   SODIUM mmol/L 135* 137 136   < > 137   POTASSIUM mmol/L 5.0 4.4 4.7   < > 4.4   CHLORIDE mmol/L 101 100 103   < > 105   CO2 mmol/L 21.0* 23.8 22.5   < > 21.8*   BUN mg/dL 34* 36* 34*   < > 20   CREATININE mg/dL 0.98 0.89 0.93   < > 0.72*   CALCIUM mg/dL 8.4* 8.7 8.7   < > 7.9*   BILIRUBIN mg/dL 1.1 0.9 0.9   < > 0.6   ALK PHOS U/L 91 82 72   < > 68   ALT (SGPT) U/L 58* 45* 40   < > 42*   AST (SGOT) U/L 44* 32 26   < > 36   GLUCOSE mg/dL 140* 126* 142*   < > 115*   WBC 10*3/mm3 18.13* 16.66* 14.21*   < > 11.09*   HEMOGLOBIN g/dL 13.7 13.6 13.1   < > 11.9*   PLATELETS 10*3/mm3 348 419 392   < > 418   PROCALCITONIN ng/mL  --   --   --   --  0.49*    < > = values in this interval not displayed.     Lab Results   Component Value Date    CALCIUM 8.4 (L) 01/12/2022    PHOS 3.6 01/12/2022           COVID LABS:  Results From Last 14 Days   Lab Units 01/12/22  0539 01/11/22  0611 01/10/22  0647 01/09/22  0559 01/09/22  0559 01/08/22  0638 01/07/22  0755 01/05/22  0623 01/04/22  0908 01/03/22  1340 01/03/22  0207 01/02/22  2019 01/02/22  2019   PROBNP pg/mL  --   --   --   --   --   --   --   --   --   --   --   --  355.9   CK TOTAL U/L 27 64 50   < > 84   < > 230*   < > 844*  --   --    < > 1,229*   CRP mg/dL 5.24* 4.02* 8.14*   < > 12.78*   < > 7.35*   < > 12.47*  --   --    < > 26.49*  25.13*   D DIMER QUANT MCGFEU/mL  --  3.60*  --   --  12.26*  --  >20.00*   < >  --   --  2.26*   < > 2.50*   FERRITIN ng/mL 3,833.00* 3,805.00* 3,970.00*   < > 3,565.00*   < > 1,778.00*   < > 2,938.00*  --   --    < > 3,621.00*   LACTATE mmol/L  --   --   --   --   --   --   --   --   --   --  1.6  --   --    LDH U/L  --  625*  --   --   614*  --  665*   < >  --   --   --   --  849*   PROCALCITONIN ng/mL  --   --   --   --   --   --  0.49*  --  5.86*  --   --   --  2.38*   TROPONIN T ng/mL  --   --   --   --   --   --   --   --  <0.010 <0.010 <0.010   < > <0.010   TRIGLYCERIDES mg/dL  --   --   --   --   --   --   --   --  140  --   --   --   --     < > = values in this interval not displayed.            Results Review:    I have reviewed the relevant laboratory results and independently reviewed the chest imaging from this hospitalization including the available echocardiogram reports personally and summarized it if/ when appropriate below    Assessment    Acute hypoxic respiratory failure s/p HHFNC/ NRB  Noninvasive ventilator use  Acute COVID-19 pneumonia - Unvaccinated patient  Acute pulmonary embolism on full AC  Secondary bacterial pneumonia s/p antibiotics  Cytokine release syndrome s/p remdesivir  Previous biceps surgery  JOSHUA - Noncompliant with PAP  Morbid obesity    PLAN:  Patient continues to be on noninvasive ventilator -some decline over the last 24 hours and is currently requiring noninvasive ventilator continuously.  Needing Precedex to help with agitation as well.  Inflammatory markers are improving but plateauing.  Finished a course of antibiotics.  C/w steroids but will wean down a little   C/w anticoagulation for DVT/PE.    We will use as needed diuretics to keep him as negative as possible  Full code    DVT/ GI prophylaxis    Very guarded prognosis. High risk for need for intubation mechanical ventilation.    D/w RN    CC - 32 mins    Pernell Alcantar MD  1/12/2022    Electronically signed by Pernell Alcantar MD at 01/12/22 1289

## 2022-01-12 NOTE — PROCEDURES
"Insert Central Line At Bedside    Date/Time: 1/12/2022 4:50 PM  Performed by: Pernell Alcantar MD  Authorized by: Pernell Alcantar MD   Consent: Verbal consent obtained.  Risks and benefits: risks, benefits and alternatives were discussed  Consent given by: guardian  Procedure consent: procedure consent matches procedure scheduled  Relevant documents: relevant documents present and verified  Test results: test results available and properly labeled  Site marked: the operative site was marked  Imaging studies: imaging studies available  Required items: required blood products, implants, devices, and special equipment available  Patient identity confirmed: arm band and hospital-assigned identification number  Time out: Immediately prior to procedure a \"time out\" was called to verify the correct patient, procedure, equipment, support staff and site/side marked as required.  Indications: vascular access  Anesthesia: see MAR for details    Sedation:  Patient sedated: no    Preparation: skin prepped with ChloraPrep  Skin prep agent dried: skin prep agent completely dried prior to procedure  Sterile barriers: all five maximum sterile barriers used - cap, mask, sterile gown, sterile gloves, and large sterile sheet  Hand hygiene: hand hygiene performed prior to central venous catheter insertion  Location details: left internal jugular  Patient position: Trendelenburg  Catheter type: triple lumen  Pre-procedure: landmarks identified  Ultrasound guidance: yes  Sterile ultrasound techniques: sterile gel and sterile probe covers were used  Number of attempts: 1  Successful placement: yes  Post-procedure: line sutured and dressing applied  Assessment: blood return through all ports,  free fluid flow,  no pneumothorax on x-ray and placement verified by x-ray  Patient tolerance: patient tolerated the procedure well with no immediate complications        "

## 2022-01-12 NOTE — PROGRESS NOTES
Pernell Alcantar MD                          [phone]      Patient ID:    Name:  Renaldo Rodriguez    MRN:  [id number]    1965   56 y.o.  male            Patient Care Team:  Provider, No Known as PCP - General    CC/ Reason for visit: Acute respiratory failure, acute COVID-19 pneumonia, unvaccinated patient, DVT/PE    Subjective: Pt seen and examined this AM.  Patient continues to need noninvasive ventilator-now unable to take breaks.  No new fevers.  Having to start on Precedex for agitation overnight.  Intermittent diuretics to keep him as negative as possible.     ROS: Unable to obtain more than about due to severe respiratory failure    Objective     Vital Signs past 24hrs    BP range: BP: (106-161)/() 117/87  Pulse range: Heart Rate:  [53-84] 63  Resp rate range: Resp:  [24-50] 32  Temp range: Temp (24hrs), Av.7 °F (37.1 °C), Min:98.6 °F (37 °C), Max:99 °F (37.2 °C)      Ventilator/Non-Invasive Ventilation Settings (From admission, onward)            None          Device (Oxygen Therapy): NPPV/NIV       101 kg (222 lb 0.1 oz); Body mass index is 32.88 kg/m².      Intake/Output Summary (Last 24 hours) at 2022 0954  Last data filed at 2022 0400  Gross per 24 hour   Intake 840 ml   Output 1000 ml   Net -160 ml       PHYSICAL EXAM   Constitutional: Middle aged pt in bed, morbidly obese white male in mild respiratory distress  Head: - NCAT  Eyes: No pallor.  Anicteric sclerae, EOMI.  ENMT:  Mallampati 4, no oral thrush. Moist MM.   NECK: Trachea midline, No thyromegaly, no palpable cervical lymphadenopathy  Heart: RRR, no murmur. Trace bilateral pedal edema   Lungs: RENATO +, decreased breath sounds at the bases full no wheezes/ crackles heard    Abdomen: Soft. Obese. No tenderness, guarding or rigidity. No palpable masses  Extremities: Extremities warm and well perfused. No cyanosis/ clubbing  Neuro: Conscious, answers appropriately, no gross focal neuro  deficits  Psych: Mood and affect -anxious    PPE recommended per [facility] infectious disease Isolation protocol for the current clinical scenario(as mentioned below) was followed.     Scheduled meds:  cholecalciferol, 1,000 Units, Oral, Daily  dexamethasone, 10 mg, Oral, Q12H  enoxaparin, 1 mg/kg, Subcutaneous, Q12H  famotidine, 20 mg, Oral, BID  furosemide, 40 mg, Intravenous, Daily  ipratropium-albuterol, 3 mL, Nebulization, 4x Daily - RT  polyethylene glycol, 17 g, Oral, Daily  pravastatin, 10 mg, Oral, Nightly  senna-docusate sodium, 2 tablet, Oral, BID  sodium chloride, 10 mL, Intravenous, Q12H      IV meds:                       dexmedetomidine, 0.2-1.5 mcg/kg/hr, Last Rate: 0.3 mcg/kg/hr (01/12/22 0827)      Data Review:    Results from last 7 days   Lab Units 01/12/22  0539 01/11/22  0611 01/10/22  0647 01/08/22  0638 01/07/22  0755   SODIUM mmol/L 135* 137 136   < > 137   POTASSIUM mmol/L 5.0 4.4 4.7   < > 4.4   CHLORIDE mmol/L 101 100 103   < > 105   CO2 mmol/L 21.0* 23.8 22.5   < > 21.8*   BUN mg/dL 34* 36* 34*   < > 20   CREATININE mg/dL 0.98 0.89 0.93   < > 0.72*   CALCIUM mg/dL 8.4* 8.7 8.7   < > 7.9*   BILIRUBIN mg/dL 1.1 0.9 0.9   < > 0.6   ALK PHOS U/L 91 82 72   < > 68   ALT (SGPT) U/L 58* 45* 40   < > 42*   AST (SGOT) U/L 44* 32 26   < > 36   GLUCOSE mg/dL 140* 126* 142*   < > 115*   WBC 10*3/mm3 18.13* 16.66* 14.21*   < > 11.09*   HEMOGLOBIN g/dL 13.7 13.6 13.1   < > 11.9*   PLATELETS 10*3/mm3 348 419 392   < > 418   PROCALCITONIN ng/mL  --   --   --   --  0.49*    < > = values in this interval not displayed.     Lab Results   Component Value Date    CALCIUM 8.4 (L) 01/12/2022    PHOS 3.6 01/12/2022           COVID LABS:  Results From Last 14 Days   Lab Units 01/12/22  0539 01/11/22  0611 01/10/22  0647 01/09/22  0559 01/09/22  0559 01/08/22  0638 01/07/22  0755 01/05/22  0623 01/04/22  0908 01/03/22  1340 01/03/22  0207 01/02/22 2019 01/02/22 2019   PROBNP pg/mL  --   --   --   --   --    --   --   --   --   --   --   --  355.9   CK TOTAL U/L 27 64 50   < > 84   < > 230*   < > 844*  --   --    < > 1,229*   CRP mg/dL 5.24* 4.02* 8.14*   < > 12.78*   < > 7.35*   < > 12.47*  --   --    < > 26.49*  25.13*   D DIMER QUANT MCGFEU/mL  --  3.60*  --   --  12.26*  --  >20.00*   < >  --   --  2.26*   < > 2.50*   FERRITIN ng/mL 3,833.00* 3,805.00* 3,970.00*   < > 3,565.00*   < > 1,778.00*   < > 2,938.00*  --   --    < > 3,621.00*   LACTATE mmol/L  --   --   --   --   --   --   --   --   --   --  1.6  --   --    LDH U/L  --  625*  --   --  614*  --  665*   < >  --   --   --   --  849*   PROCALCITONIN ng/mL  --   --   --   --   --   --  0.49*  --  5.86*  --   --   --  2.38*   TROPONIN T ng/mL  --   --   --   --   --   --   --   --  <0.010 <0.010 <0.010   < > <0.010   TRIGLYCERIDES mg/dL  --   --   --   --   --   --   --   --  140  --   --   --   --     < > = values in this interval not displayed.            Results Review:    I have reviewed the relevant laboratory results and independently reviewed the chest imaging from this hospitalization including the available echocardiogram reports personally and summarized it if/ when appropriate below    Assessment    Acute hypoxic respiratory failure s/p HHFNC/ NRB  Noninvasive ventilator use  Acute COVID-19 pneumonia - Unvaccinated patient  Acute pulmonary embolism on full AC  Secondary bacterial pneumonia s/p antibiotics  Cytokine release syndrome s/p remdesivir  Previous biceps surgery  JOSHUA - Noncompliant with PAP  Morbid obesity    PLAN:  Patient continues to be on noninvasive ventilator -some decline over the last 24 hours and is currently requiring noninvasive ventilator continuously.  Needing Precedex to help with agitation as well.  Inflammatory markers are improving but plateauing.  Finished a course of antibiotics.  C/w steroids but will wean down a little   C/w anticoagulation for DVT/PE.    We will use as needed diuretics to keep him as negative as  possible  Full code    DVT/ GI prophylaxis    Very guarded prognosis. High risk for need for intubation mechanical ventilation.    D/w RN    Spoke to patient's brother Dr. Rodriguez who is an ER physician in [place]    Addendum- Patient unfortunately declined further in the later of the day and had to be urgently intubated and placed on mechanical ventilator.  Oxygen saturations remained in the mid 80s in spite of maximal support.  And her settings have been adjusted.  BG review and chest x-ray reviewed.  Now being hypotensive while on significant support on the ventilator and urgent central line placed and patient started on vasopressor support.  Family is being updated and consent obtained for central line as well by the nurse.    CC - 76 mins not including procedure time    Pernell Alcantar MD  1/12/2022

## 2022-01-12 NOTE — PROCEDURES
"Intubation    Date/Time: 1/12/2022 4:49 PM  Performed by: Pernell Alcantar MD  Authorized by: Pernell Alcantar MD   Consent: The procedure was performed in an emergent situation.  Test results: test results available and properly labeled  Site marked: the operative site was marked  Imaging studies: imaging studies available  Required items: required blood products, implants, devices, and special equipment available  Patient identity confirmed: arm band and hospital-assigned identification number  Time out: Immediately prior to procedure a \"time out\" was called to verify the correct patient, procedure, equipment, support staff and site/side marked as required.  Indications: respiratory failure and  hypoxemia  Intubation method: video-assisted  Patient status: awake  Preoxygenation: BVM  Pretreatment medications: fentanyl  Sedatives: propofol  Paralytic: none  Laryngoscope size: Mac 3  Tube size: 8.0 mm  Tube type: cuffed  Number of attempts: 1  Ventilation between attempts: BVM  Cords visualized: yes  Post-procedure assessment: CO2 detector and chest rise  Breath sounds: equal  Cuff inflated: yes  ETT to lip: 25 cm  Tube secured with: ETT dawn  Chest x-ray interpreted by me.  Chest x-ray findings: endotracheal tube in appropriate position  Patient tolerance: patient tolerated the procedure well with no immediate complications        "

## 2022-01-12 NOTE — SIGNIFICANT NOTE
Assisted nurse in pulling pt up in bed, positioning for comfort and place pt on Airvo at 60L/100% inorder to take a.m. meds. Pt was able to take one med only, was very tired. Sats dropped to 80% before coming back up to 86%. Pt was instructed on breathing and to answer yes or no with head shake. Pt kept eyes closed thru encounter. When asked if he felt really tired, he answered yes. Sats did not improve and pt was placed back on Bipap. No other morning meds were able to be given orally.

## 2022-01-12 NOTE — CONSULTS
"Adult Nutrition  Assessment/PES    Patient Name:  Renaldo Rodriguez  YOB: 1965  MRN: [id number]  Admit Date:  1/2/2022    Assessment Date:  1/12/2022    Comments:  Nutrition Consult for Cortrak placement and initiation of TF's.      Pt now on the vent. Propofol 17.4ml/hr. NG cortrak placed.Will begin TF's with Peptamen AF goal at 55ml/hr and  Water 96hkg9ea.  Will follow for toleration.      Reason for Assessment     Row Name 01/12/22 1418          Reason for Assessment    Reason For Assessment physician consult; TF/PN     Diagnosis --  intubated                Nutrition/Diet History     Row Name 01/12/22 1419          Nutrition/Diet History    Typical Food/Fluid Intake TF's to start                Anthropometrics     Row Name 01/12/22 1129          Anthropometrics    Height 175 cm (68.9\")            Ideal Body Weight (IBW)    Ideal Body Weight (IBW) (kg) 73.4                Labs/Tests/Procedures/Meds     Row Name 01/12/22 1419          Labs/Procedures/Meds    Lab Results Comments glu, na 135, bun, mg 2.7, alt, alb, crp            Medications    Pertinent Medications Comments PPF, vit D decadron, lovenox, pepcid, lasix, maralax, pericolace                Physical Findings     Row Name 01/12/22 1421          Physical Findings    Overall Physical Appearance on ventilator support     Gastrointestinal feeding tube     Tubes nasogastric tube                Estimated/Assessed Needs     Row Name 01/12/22 1421 01/12/22 1129       Calculation Measurements    Weight Used For Calculations 101 kg (222 lb 10.6 oz) --    Height -- 175 cm (68.9\")       Estimated/Assessed Needs    Additional Documentation KCAL/KG (Group); Fluid Requirements (Group); Protein Requirements (Group) --       KCAL/KG    KCAL/KG 15 Kcal/Kg (kcal); 20 Kcal/Kg (kcal) --    15 Kcal/Kg (kcal) 1515 --    20 Kcal/Kg (kcal) 2020 --       Protein Requirements    Weight Used For Protein Calculations 73.4 kg (161 lb 13.1 oz) --    Est Protein " Requirement Amount (gms/kg) 1.5 gm protein --    Estimated Protein Requirements (gms/day) 110.1 --       Fluid Requirements    Fluid Requirements (mL/day) 2020 --    RDA Method (mL) 2020 --               Nutrition Prescription Ordered     Row Name 01/12/22 1422          Nutrition Prescription PO    Current PO Diet NPO            Nutrition Prescription EN    Enteral Route NG     Product Peptamen AF (Vital AF 1.2)     TF Delivery Method Continuous     Continuous TF Goal Rate (mL/hr) 55 mL/hr     Water flush (mL)  30 mL     Water Flush Frequency Every 4 hours            Propofol Considerations    Propofol (mL/hr) 17.6 mL/hr     Propofol (Kcal/day) 464 Kcal/day                Evaluation of Received Nutrient/Fluid Intake     Row Name 01/12/22 1427          Calories Evaluation    Enteral Calories (kcal) 1584     Other Calories (kcal) 464     Total Calories (kcal) 2048     % of Kcal Needs 100            Protein Evaluation    Enteral Protein (gm) 100.32     % of Protein Needs 100            Fluid Intake Evaluation    Enteral (Free Water) Fluid (mL) 1069     Free Water Flush Fluid (mL) 180     Total Free Water Intake (mL) 1249 mL            EN Evaluation    HOB Greater than or equal to 30 degress                     Problem/Interventions:       Problem 3     Row Name 01/12/22 1430          Nutrition Diagnoses Problem 3    Problem 3 Needs Alternate Route     Etiology (related to) Medical Diagnosis     Pulmonary/Critical Care Acute respiratory failure; Ventilator     Signs/Symptoms (evidenced by) NPO                  Intervention Goal     Row Name 01/12/22 1430          Intervention Goal    General Maintain nutrition; Nutrition support treatment; Improved nutrition related lab(s); Reduce/improve symptoms; Meet nutritional needs for age/condition; Disease management/therapy     TF/PN Inititiate TF/PN; Tolerate TF at goal     Weight No significant weight loss                Nutrition Intervention     Row Name 01/12/22 5890           Nutrition Intervention    RD/Tech Action Follow Tx progress; Care plan reviewd; Recommend/ordered     Recommended/Ordered EN                Nutrition Prescription     Row Name 01/12/22 1431          Nutrition Prescription EN    Enteral Prescription Enteral begin/change     Product Peptamen AF     TF Delivery Method Continuous     Continuous TF Goal Rate (mL/hr) 55 mL/hr     Water flush (mL)  30 mL     Water Flush Frequency Every 4 hours                Education/Evaluation     Row Name 01/12/22 1431          Monitor/Evaluation    Monitor Per protocol; I&O; Pertinent labs; TF delivery/tolerance; Weight; Skin status                 Electronically signed by:  Rama Klein RD  01/12/22 14:34 EST

## 2022-01-13 NOTE — NURSING NOTE
Patient remains in CCU. Patient 100% on bipap this morning--- began to desat to 70%. Patient intubated today around 2pm. Patient began to desat again & became asynchronous with vent; Paralytics started per protocol. Blood pressure dropped 70/40s; 1L bolus given & levophed started. Central line placed with Dr. PIERRE Price almost weaned off. Patient currently on nimbex. Propofol, fentanyl, & precedex gtts. Cortrak placed this evening. Wife updated via phone. MARU contacted; will not be following.

## 2022-01-13 NOTE — PROGRESS NOTES
Pernell Alcantar MD                          [phone]      Patient ID:    Name:  Renaldo Rodriguez    MRN:  [id number]    1965   56 y.o.  male            Patient Care Team:  Provider, No Known as PCP - General    CC/ Reason for visit: Acute respiratory failure, acute COVID-19 pneumonia, unvaccinated patient, DVT/PE    Subjective: Pt seen and examined this AM.  Patient remains on the mechanical ventilator sedated and paralyzed.  New fevers now.  Unable to make much progress with regards to oxygen needs.  We will work on decreasing some PEEP.  Having severe hyperkalemia and acidosis.  Needing pressors as well.  Back on antibiotics for new possible pneumonia and septic shock.    ROS: Unable to obtain more than about due to severe respiratory failure    Objective     Vital Signs past 24hrs    BP range: BP: ()/(47-95) 102/60  Pulse range: Heart Rate:  [68-80] 73  Resp rate range: Resp:  [24-38] 26  Temp range: Temp (24hrs), Av.1 °F (37.8 °C), Min:98.1 °F (36.7 °C), Max:101.3 °F (38.5 °C)      Ventilator/Non-Invasive Ventilation Settings (From admission, onward)            None          Device (Oxygen Therapy): ventilator FiO2 (%): 60 %     103 kg (226 lb 3.1 oz); Body mass index is 33.5 kg/m².      Intake/Output Summary (Last 24 hours) at 2022 1211  Last data filed at 2022 0530  Gross per 24 hour   Intake 935 ml   Output 450 ml   Net 485 ml       PHYSICAL EXAM   Constitutional: Middle aged pt in bed, morbidly obese white male in mild respiratory distress  Head: - NCAT  Eyes: No pallor.  Anicteric sclerae, EOMI.  ENMT:  Mallampati 4, no oral thrush. Moist MM.   NECK: Trachea midline, No thyromegaly, no palpable cervical lymphadenopathy  Heart: RRR, no murmur. Trace bilateral pedal edema   Lungs: RENATO +, decreased breath sounds at the bases full no wheezes/ crackles heard    Abdomen: Soft. Obese. No tenderness, guarding or rigidity. No palpable  masses  Extremities: Extremities warm and well perfused. No cyanosis/ clubbing  Neuro: Sedated and paralyzed  Psych: Mood and affect - UTO    PPE recommended per [facility] infectious disease Isolation protocol for the current clinical scenario(as mentioned below) was followed.     Scheduled meds:  artificial tears, , Both Eyes, Q4H  cefepime, 2 g, Intravenous, Once  [START ON 1/14/2022] cefepime, 2 g, Intravenous, Q24H  cholecalciferol, 1,000 Units, Oral, Daily  dexamethasone, 6 mg, Intravenous, Q12H  enoxaparin, 1 mg/kg, Subcutaneous, Q12H  famotidine, 20 mg, Intravenous, Daily  ipratropium-albuterol, 3 mL, Nebulization, 4x Daily - RT  polyethylene glycol, 17 g, Oral, Daily  pravastatin, 10 mg, Oral, Nightly  senna-docusate sodium, 2 tablet, Oral, BID  sodium chloride, 10 mL, Intravenous, Q12H  sodium polystyrene, 25 g, Oral, Once  vancomycin, 20 mg/kg, Intravenous, Once  Vancomycin Pharmacy Intermittent Dosing, , Does not apply, Daily      IV meds:                       cisatracurium (NIMBEX) infusion, 3-10 mcg/kg/min, Last Rate: 3 mcg/kg/min (01/12/22 1640)  dexmedetomidine, 0.2-1.5 mcg/kg/hr, Last Rate: 1.3 mcg/kg/hr (01/13/22 1002)  fentaNYL citrate-NaCl,  mcg/hr, Last Rate: 50 mcg/hr (01/12/22 1647)  norepinephrine, 0.02-0.3 mcg/kg/min, Last Rate: 0.06 mcg/kg/min (01/13/22 0530)  Pharmacy to dose vancomycin,   Phoxillum BK4/2.5, 1,500 mL/hr  Phoxillum BK4/2.5, 1,500 mL/hr  Phoxillum BK4/2.5, 1,500 mL/hr  propofol, 5-50 mcg/kg/min, Last Rate: 40 mcg/kg/min (01/13/22 0837)      Data Review:    Results from last 7 days   Lab Units 01/13/22  0426 01/12/22  0539 01/11/22  0611 01/08/22  0638 01/07/22  0755   SODIUM mmol/L 137 135* 137   < > 137   POTASSIUM mmol/L 6.7* 5.0 4.4   < > 4.4   CHLORIDE mmol/L 100 101 100   < > 105   CO2 mmol/L 21.3* 21.0* 23.8   < > 21.8*   BUN mg/dL 61* 34* 36*   < > 20   CREATININE mg/dL 3.05* 0.98 0.89   < > 0.72*   CALCIUM mg/dL 7.5* 8.4* 8.7   < > 7.9*   BILIRUBIN mg/dL  2.4* 1.1 0.9   < > 0.6   ALK PHOS U/L 90 91 82   < > 68   ALT (SGPT) U/L 53* 58* 45*   < > 42*   AST (SGOT) U/L 38 44* 32   < > 36   GLUCOSE mg/dL 143* 140* 126*   < > 115*   WBC 10*3/mm3 33.63* 18.13* 16.66*   < > 11.09*   HEMOGLOBIN g/dL 13.3 13.7 13.6   < > 11.9*   PLATELETS 10*3/mm3 415 348 419   < > 418   INR  1.44*  --   --   --   --    PROCALCITONIN ng/mL  --   --   --   --  0.49*    < > = values in this interval not displayed.     Lab Results   Component Value Date    CALCIUM 7.5 (L) 01/13/2022    PHOS 11.1 (H) 01/13/2022           COVID LABS:  Results From Last 14 Days   Lab Units 01/13/22  0426 01/12/22  0539 01/11/22  0611 01/10/22  0647 01/09/22  0559 01/08/22  0638 01/07/22  0755 01/05/22  0623 01/04/22  0908 01/03/22  1340 01/03/22  0207 01/02/22 2019 01/02/22 2019   PROBNP pg/mL  --   --   --   --   --   --   --   --   --   --   --   --  355.9   CK TOTAL U/L 29 27 64   < > 84   < > 230*   < > 844*  --   --    < > 1,229*   CRP mg/dL 21.48* 5.24* 4.02*   < > 12.78*   < > 7.35*   < > 12.47*  --   --    < > 26.49*  25.13*   D DIMER QUANT MCGFEU/mL 5.28*  --  3.60*  --  12.26*  --  >20.00*   < >  --   --  2.26*   < > 2.50*   FERRITIN ng/mL 4,419.00* 3,833.00* 3,805.00*   < > 3,565.00*   < > 1,778.00*   < > 2,938.00*  --   --    < > 3,621.00*   LACTATE mmol/L  --   --   --   --   --   --   --   --   --   --  1.6  --   --    LDH U/L 719*  --  625*  --  614*  --  665*   < >  --   --   --   --  849*   PROCALCITONIN ng/mL  --   --   --   --   --   --  0.49*  --  5.86*  --   --   --  2.38*   PROTIME Seconds 17.4*  --   --   --   --   --   --   --   --   --   --   --   --    INR  1.44*  --   --   --   --   --   --   --   --   --   --   --   --    TROPONIN T ng/mL  --   --   --   --   --   --   --   --  <0.010 <0.010 <0.010   < > <0.010   TRIGLYCERIDES mg/dL  --   --   --   --   --   --   --   --  140  --   --   --   --     < > = values in this interval not displayed.     Results from last 7 days   Lab Units  01/12/22  1545   PH, ARTERIAL pH units 7.331*   PO2 ART mm Hg 56.0*   PCO2, ARTERIAL mm Hg 46.0*   HCO3 ART mmol/L 24.3        Results Review:    I have reviewed the relevant laboratory results and independently reviewed the chest imaging from this hospitalization including the available echocardiogram reports personally and summarized it if/ when appropriate below    Assessment    Acute hypoxic respiratory failure s/p vent - 1/12 after prolonged NIPPV  Acute COVID-19 pneumonia - Unvaccinated patient  ARDS s/p paralytic  Acute pulmonary embolism on full AC  Secondary bacterial pneumonia  Septic shock  Cytokine release syndrome s/p remdesivir  Acute renal failure s/p CRRT  Acute hyperkalemia  Previous biceps surgery  JOSHUA - Noncompliant with PAP  Morbid obesity    PLAN:  Patient unfortunately continues to decline and is on mechanical ventilator on significant support.  Ventilator settings adjusted.  ABG reviewed.  Repeat chest x-ray reviewed.  We will work on weaning down some support  Unfortunately now with severe renal failure and anuria requiring urgent dialysis and catheter has been placed after discussion with nephrology this morning and plan is to start CRRT.  Continue with deep sedation and paralytics at this point.  We will try to work on weaning down if he does make some progress  We will restart broad-spectrum antibiotics given new fevers.  C/w steroids weaning  C/w anticoagulation for DVT/PE.      Full code    DVT/ GI prophylaxis    Very guarded prognosis. High risk for need for intubation mechanical ventilation.    D/w RN and MDR.     Spoke to patient's brother Dr. Rodriguez (ER physician in [place]) previously and wife today in detail - very poor prognosis discussed. DNR and full Rx rec'd.     CC - 76 mins not including procedure time    Pernell Alcantar MD  1/13/2022

## 2022-01-13 NOTE — PLAN OF CARE
Goal Outcome Evaluation:   Dr. Solares given lab results and ABG's. Renal consult, ekg and med ordered.

## 2022-01-13 NOTE — PROGRESS NOTES
"[facility] Clinical Pharmacy Services: Vancomycin Pharmacokinetic Initial Consult Note    Renaldo Rodriguez is a 56 y.o. male who is on day 1 of pharmacy to dose vancomycin.    Indication: PNA  Consulting Provider: Dr. Alcantar  Planned Duration of Therapy: TBD  Loading Dose Ordered or Given: 2000mg IV x1 1/13 @ 1256  MRSA PCR performed: 1/13 MRSA PCR negative  Culture/Source:   1/13 blood cx x2 pending  Target: Dose by Levels  Other Antimicrobials: cefepime    Vitals/Labs  Ht: 175 cm (68.9\"); Wt: 103 kg (226 lb 3.1 oz)  Temp Readings from Last 1 Encounters:   01/13/22 (!) 100.94 °F (38.3 °C)    Estimated Creatinine Clearance: 31.6 mL/min (A) (by C-G formula based on SCr of 3.05 mg/dL (H)).    CRRT started 1/13/22     Results from last 7 days   Lab Units 01/13/22  0426 01/12/22  0539 01/11/22  0611   CREATININE mg/dL 3.05* 0.98 0.89   WBC 10*3/mm3 33.63* 18.13* 16.66*     Assessment/Plan:  Acute renal failure, pt started on CRRT this afternoon.   Vancomycin Dose:  Given loading dose vancomycin this afternoon. Dose intermittently for now.  Check random level in AM tomorrow to evaluate prior to re-dosing.    Pharmacy will follow patient's kidney function and will adjust doses and obtain levels as necessary. Thank you for involving pharmacy in this patient's care. Please contact pharmacy with any questions or concerns.                           Shantal Hammer, PharmD  Clinical Pharmacist    "

## 2022-01-13 NOTE — CONSULTS
[facility] Consult Note      Patient Name: Renaldo Rodriguez  : 1965  MRN: [id number]  Primary Care Physician:  Provider, No Known  Referring Physician: John Maher,*  Date of admission: 2022    Subjective     Reason for Consult: Acute kidney injury and hyperkalemia    HPI:   Renaldo Rodriguez is a 56 y.o. male patient was admitted on 2022 with COVID pneumonia currently on the ventilator on vasopressors noted to have increased creatinine and hyperkalemia hence nephrology consult was requested.  Patient has a history of back pain related to lumbar disc disease, prior to admission he was on ibuprofen, history of dyslipidemia.  Patient is sedated and on the ventilator  All the information were obtained from the chart  Creatinine this morning 3.05, potassium 6.7, yesterday his creatinine was 0.90  Review of Systems:   Not obtainable because the patient is on the ventilator and sedated    Personal History     Past Medical History:   Diagnosis Date   • Biceps muscle strain 2021    torn bicep tendon   • Injury of back     lifted something at work, playing golf   • Lumbar herniated disc    • Numbness and tingling of right lower extremity    • Right leg pain        Past Surgical History:   Procedure Laterality Date   • BICEPS TENDON REPAIR Right    • LUMBAR DISCECTOMY Right 10/4/2019    Procedure: Right lumbar 4 to lumbar 5 laminectomy and discectomy with metrx;  Surgeon: Ghanshyam Canas MD;  Location: [facility];  Service: Neurosurgery   • NO PAST SURGERIES         Family History: family history is not on file.    Social History:  reports that he has never smoked. He has never used smokeless tobacco. He reports current alcohol use. He reports that he does not use drugs.    Home Medications:  Prior to Admission medications    Medication Sig Start Date End Date Taking? Authorizing Provider   acetaminophen (TYLENOL) 500 MG tablet Take 1,000 mg by mouth Every 6 (Six)  Hours As Needed for Mild Pain .   Yes ProviderPorfirio MD   albuterol sulfate  (90 Base) MCG/ACT inhaler Inhale 2 puffs Every 4 (Four) Hours As Needed for Wheezing for up to 30 days. 12/31/21 1/30/22 Yes Levar Ortega MD   dexamethasone (DECADRON) 1.5 MG tablet 3 am 3 pm for 4 day's, 3 am 2 pm for 1 day, 2 am 2 pm for 3 day's, 2 am 1 pm for 1 day, 1 am 1 pm for 3 day's, 1 po am x 1 day only 10/7/19  Yes Ghanshyam Canas MD   ibuprofen (ADVIL,MOTRIN) 200 MG tablet Take 200 mg by mouth Every 6 (Six) Hours As Needed for Mild Pain .   Yes Porfirio Pugh MD   ivermectin (STROMECTOL) 3 MG tablet tablet Take 3 mg by mouth 1 (One) Time.   Yes Porfirio Pugh MD   loratadine (CLARITIN) 10 MG tablet Take 10 mg by mouth Daily.    ProviderPorfirio MD       Allergies:  No Known Allergies    Objective     Vitals:   Temp:  [98.1 °F (36.7 °C)-101.3 °F (38.5 °C)] 101.3 °F (38.5 °C)  Heart Rate:  [60-85] 76  Resp:  [24-38] 24  BP: ()/() 102/60  FiO2 (%):  [85 %-100 %] 85 %    Intake/Output Summary (Last 24 hours) at 1/13/2022 0804  Last data filed at 1/13/2022 0530  Gross per 24 hour   Intake 1055 ml   Output 850 ml   Net 205 ml       Physical Exam:   Constitutional: The ventilator, sedated and appears to be chronically ill  HEENT: Sclera anicteric, no conjunctival injection, orally intubated  Neck:  No thyromegaly, no lymphadenopathy, trachea at midline, no JVD  Respiratory: Bilateral rhonchi and crackles, nonlabored respiration  Cardiovascular: RRR, no murmurs, no rubs or gallops, no carotid bruit  Gastrointestinal: Positive bowel sounds, abdomen is soft, no guarding and nondistended  : No palpable bladder, Walker catheter anchored in place  Musculoskeletal: No edema, no clubbing or cyanosis  Psychiatric: Unable to assess  Neurologic: Unable to assess  Skin: Warm and dry       Scheduled Meds:     artificial tears, , Both Eyes, Q4H  cefepime, 2 g, Intravenous, Once  [START ON 1/14/2022]  cefepime, 2 g, Intravenous, Q24H  cholecalciferol, 1,000 Units, Oral, Daily  dexamethasone, 6 mg, Intravenous, Q12H  enoxaparin, 1 mg/kg, Subcutaneous, Q12H  famotidine, 20 mg, Intravenous, Daily  ipratropium-albuterol, 3 mL, Nebulization, 4x Daily - RT  polyethylene glycol, 17 g, Oral, Daily  pravastatin, 10 mg, Oral, Nightly  senna-docusate sodium, 2 tablet, Oral, BID  sodium chloride, 10 mL, Intravenous, Q12H  sodium polystyrene, 25 g, Oral, Once  vancomycin, 20 mg/kg, Intravenous, Once  Vancomycin Pharmacy Intermittent Dosing, , Does not apply, Daily      IV Meds:   cisatracurium (NIMBEX) infusion, 3-10 mcg/kg/min, Last Rate: 3 mcg/kg/min (01/12/22 1640)  dexmedetomidine, 0.2-1.5 mcg/kg/hr, Last Rate: 1.2 mcg/kg/hr (01/13/22 0520)  fentaNYL citrate-NaCl,  mcg/hr, Last Rate: 50 mcg/hr (01/12/22 1647)  norepinephrine, 0.02-0.3 mcg/kg/min, Last Rate: 0.06 mcg/kg/min (01/13/22 0530)  Pharmacy to dose vancomycin,   propofol, 5-50 mcg/kg/min, Last Rate: 40 mcg/kg/min (01/13/22 0712)        Results Reviewed:   I have personally reviewed the results from the time of this admission to 1/13/2022 08:04 EST     Lab Results   Component Value Date    GLUCOSE 143 (H) 01/13/2022    CALCIUM 7.5 (L) 01/13/2022     01/13/2022    K 6.7 (C) 01/13/2022    CO2 21.3 (L) 01/13/2022     01/13/2022    BUN 61 (H) 01/13/2022    CREATININE 3.05 (H) 01/13/2022    EGFRIFNONA 21 (L) 01/13/2022    BCR 20.0 01/13/2022    ANIONGAP 15.7 (H) 01/13/2022      Lab Results   Component Value Date    MG 2.9 (H) 01/13/2022    PHOS 11.1 (H) 01/13/2022    ALBUMIN 2.80 (L) 01/13/2022           Assessment / Plan     ASSESSMENT:  1. acute kidney injury secondary to septic shock currently with decreased urine output and potassium is 6.7  2. COVID-19 pneumonia  3. Septic shock  4. Acute respiratory failure with hypoxia    PLAN:  1. with the patient hemodynamic instability and hyperkalemia we'll start CRRT  2. Prognosis very guarded  3. I agree  with the present treatment  4. Surveillance labs    I discussed the case with the patient's nurse and with     Thank you for involving us in the care of Renaldo Rodriguez.  Please feel free to call with any questions.    Kg Felton MD  01/13/22  08:04 [facility]    [facility]  [phone]      Much of this encounter note is an electronic transcription/translation of spoken language to printed text. The electronic translation of spoken language may permit erroneous, or at times, nonsensical words or phrases to be inadvertently transcribed; Although I have reviewed the note for such errors, some may still exist.

## 2022-01-13 NOTE — PROCEDURES
"Non-Tunneled Catheter    Date/Time: 1/13/2022 12:21 PM  Performed by: Pernell Alcantar MD  Authorized by: Pernell Alcantar MD   Consent: The procedure was performed in an emergent situation. Verbal consent obtained.  Risks and benefits: risks, benefits and alternatives were discussed  Consent given by: guardian  Procedure consent: procedure consent matches procedure scheduled  Relevant documents: relevant documents present and verified  Test results: test results available and properly labeled  Site marked: the operative site was marked  Imaging studies: imaging studies available  Required items: required blood products, implants, devices, and special equipment available  Patient identity confirmed: arm band and hospital-assigned identification number  Time out: Immediately prior to procedure a \"time out\" was called to verify the correct patient, procedure, equipment, support staff and site/side marked as required.  Indications: vascular access  Anesthesia: see MAR for details  Preparation: skin prepped with ChloraPrep  Skin prep agent dried: skin prep agent completely dried prior to procedure  Sterile barriers: all five maximum sterile barriers used - cap, mask, sterile gown, sterile gloves, and large sterile sheet  Hand hygiene: hand hygiene performed prior to central venous catheter insertion  Location details: right internal jugular  Patient position: Trendelenburg  Procedural supplies: Dialysis cath   Pre-procedure: landmarks identified  Ultrasound guidance: yes  Number of attempts: 1  Successful placement: yes  Post-procedure: line sutured  Assessment: blood return through all ports,  free fluid flow,  placement verified by x-ray and no pneumothorax on x-ray  Patient tolerance: patient tolerated the procedure well with no immediate complications        "

## 2022-01-13 NOTE — NURSING NOTE
Attempted to initiate CRRT around noon, but was not efficient. Negative pressure alarms on CRRT machine. CRRT access very positional. ICON called; filter ended up needing to be changed. CRRT access lines switched to initiate CRRT effectively.

## 2022-01-14 NOTE — NURSING NOTE
Large amount of air bubbles coming out of CRRT access. Air detected in filter on CRRT machine; filter had to be taken down & blood unable to be given back.   range of motion is not limited and there is no muscle tenderness.

## 2022-01-14 NOTE — PROGRESS NOTES
Pernell Alcantar MD                          [phone]      Patient ID:    Name:  Renaldo Rodriguez    MRN:  [id number]    1965   57 y.o.  male            Patient Care Team:  Provider, No Known as PCP - General    CC/ Reason for visit: Acute respiratory failure, acute COVID-19 pneumonia, unvaccinated patient, DVT/PE    Subjective: Pt seen and examined this AM.  Patient remains on the mechanical ventilator sedated and paralyzed.  No new fevers.  Tolerating antibiotics.  Having issues with CRRT and volume status.  Plan is to get a new dialysis line today    ROS: Unable to obtain more than about due to severe respiratory failure    Objective     Vital Signs past 24hrs    BP range: BP: ()/(56-86) 133/86  Pulse range: Heart Rate:  [40-74] 61  Resp rate range: Resp:  [25-26] 26  Temp range: Temp (24hrs), Av.4 °F (37.4 °C), Min:94.82 °F (34.9 °C), Max:100.22 °F (37.9 °C)      Ventilator/Non-Invasive Ventilation Settings (From admission, onward)            None          Device (Oxygen Therapy): ventilator FiO2 (%): 60 %     103 kg (226 lb 3.1 oz); Body mass index is 33.5 kg/m².      Intake/Output Summary (Last 24 hours) at 2022 1420  Last data filed at 2022 0900  Gross per 24 hour   Intake 828 ml   Output 2192.1 ml   Net -1364.1 ml       PHYSICAL EXAM   Constitutional: Middle aged pt in bed, morbidly obese white male in mild respiratory distress  Head: - NCAT  Eyes: No pallor.  Anicteric sclerae, EOMI.  ENMT:  Mallampati 4, no oral thrush. Moist MM.   NECK: Trachea midline, No thyromegaly, no palpable cervical lymphadenopathy  Heart: RRR, no murmur. Trace bilateral pedal edema   Lungs: RENATO +, decreased breath sounds at the bases full no wheezes/ crackles heard    Abdomen: Soft. Obese. No tenderness, guarding or rigidity. No palpable masses  Extremities: Extremities warm and well perfused. No cyanosis/ clubbing  Neuro: Sedated and paralyzed  Psych: Mood and affect  - UTO    PPE recommended per [facility] infectious disease Isolation protocol for the current clinical scenario(as mentioned below) was followed.     Scheduled meds:  artificial tears, , Both Eyes, Q4H  atropine, 0.4 mg, Intravenous, Once  atropine sulfate, , ,   atropine, 1 mg, Intravenous, Once  cefepime, 2 g, Intravenous, Q8H  cholecalciferol, 1,000 Units, Oral, Daily  dexamethasone, 6 mg, Intravenous, Q12H  famotidine, 20 mg, Intravenous, Daily  ipratropium-albuterol, 3 mL, Nebulization, 4x Daily - RT  polyethylene glycol, 17 g, Oral, Daily  pravastatin, 10 mg, Oral, Nightly  senna-docusate sodium, 2 tablet, Oral, BID  sodium chloride, 10 mL, Intravenous, Q12H  sodium polystyrene, 25 g, Oral, Once      IV meds:                       cisatracurium (NIMBEX) infusion, 3-10 mcg/kg/min, Last Rate: 3 mcg/kg/min (01/14/22 0330)  dexmedetomidine, 0.2-1.5 mcg/kg/hr, Last Rate: Stopped (01/14/22 0830)  fentaNYL citrate-NaCl,  mcg/hr, Last Rate: Stopped (01/14/22 1015)  heparin, 14.6 Units/kg/hr, Last Rate: 11.6 Units/kg/hr (01/14/22 0855)  norepinephrine, 0.02-0.3 mcg/kg/min, Last Rate: 0.08 mcg/kg/min (01/14/22 0330)  Phoxillum BK4/2.5, 1,500 mL/hr, Last Rate: 1,500 mL/hr (01/14/22 1350)  Phoxillum BK4/2.5, 1,500 mL/hr, Last Rate: 1,500 mL/hr (01/14/22 1349)  Phoxillum BK4/2.5, 1,500 mL/hr, Last Rate: 1,500 mL/hr (01/14/22 1347)  propofol, 5-50 mcg/kg/min, Last Rate: 30 mcg/kg/min (01/14/22 0740)      Data Review:    Results from last 7 days   Lab Units 01/14/22  0559 01/14/22  0057 01/13/22  1379 01/13/22  1556 01/13/22  0426 01/12/22  0539 01/12/22  0539   SODIUM mmol/L 134*  135* 136 134*   < > 137   < > 135*   POTASSIUM mmol/L 4.8  4.8 4.9 4.9   < > 6.7*   < > 5.0   CHLORIDE mmol/L 102  103 102 102   < > 100   < > 101   CO2 mmol/L 19.8*  19.5* 19.3* 18.9*   < > 21.3*   < > 21.0*   BUN mg/dL 38*  39* 36* 46*   < > 61*   < > 34*   CREATININE mg/dL 2.15*  2.11* 2.44* 3.05*   < > 3.05*   < > 0.98    CALCIUM mg/dL 7.7*  7.5* 7.4* 7.1*   < > 7.5*   < > 8.4*   BILIRUBIN mg/dL 1.0  --   --   --  2.4*  --  1.1   ALK PHOS U/L 70  --   --   --  90  --  91   ALT (SGPT) U/L 36  --   --   --  53*  --  58*   AST (SGOT) U/L 34  --   --   --  38  --  44*   GLUCOSE mg/dL 105*  104* 96 103*   < > 143*   < > 140*   WBC 10*3/mm3 30.83*  --   --   --  33.63*  --  18.13*   HEMOGLOBIN g/dL 11.7*  --   --   --  13.3  --  13.7   PLATELETS 10*3/mm3 324  --   --   --  415  --  348   INR   --   --   --   --  1.44*  --   --     < > = values in this interval not displayed.     Lab Results   Component Value Date    CALCIUM 7.5 (L) 01/14/2022    CALCIUM 7.7 (L) 01/14/2022    PHOS 6.3 (H) 01/14/2022     Results from last 7 days   Lab Units 01/13/22  1056 01/13/22  1018   BLOODCX  No growth at 24 hours No growth at 24 hours       COVID LABS:  Results From Last 14 Days   Lab Units 01/14/22  0559 01/13/22  0426 01/12/22  0539 01/11/22  0611 01/11/22  0611 01/10/22  0647 01/09/22  0559 01/08/22  0638 01/07/22  0755 01/05/22  0623 01/04/22  0908 01/03/22  1340 01/03/22  0207 01/02/22 2019 01/02/22 2019   PROBNP pg/mL  --   --   --   --   --   --   --   --   --   --   --   --   --   --  355.9   CK TOTAL U/L 35 29 27   < > 64   < > 84   < > 230*   < > 844*  --   --    < > 1,229*   CRP mg/dL 15.40* 21.48* 5.24*   < > 4.02*   < > 12.78*   < > 7.35*   < > 12.47*  --   --    < > 26.49*  25.13*   D DIMER QUANT MCGFEU/mL  --  5.28*  --   --  3.60*  --  12.26*  --  >20.00*   < >  --   --  2.26*   < > 2.50*   FERRITIN ng/mL 3,918.00* 4,419.00* 3,833.00*   < > 3,805.00*   < > 3,565.00*   < > 1,778.00*   < > 2,938.00*  --   --    < > 3,621.00*   LACTATE mmol/L  --   --   --   --   --   --   --   --   --   --   --   --  1.6  --   --    LDH U/L  --  719*  --   --  625*  --  614*  --  665*   < >  --   --   --   --  849*   PROCALCITONIN ng/mL  --   --   --   --   --   --   --   --  0.49*  --  5.86*  --   --   --  2.38*   PROTIME Seconds  --  17.4*  --    --   --   --   --   --   --   --   --   --   --   --   --    INR   --  1.44*  --   --   --   --   --   --   --   --   --   --   --   --   --    TROPONIN T ng/mL  --   --   --   --   --   --   --   --   --   --  <0.010 <0.010 <0.010   < > <0.010   TRIGLYCERIDES mg/dL  --   --   --   --   --   --   --   --   --   --  140  --   --   --   --     < > = values in this interval not displayed.     Results from last 7 days   Lab Units 01/12/22  1545   PH, ARTERIAL pH units 7.331*   PO2 ART mm Hg 56.0*   PCO2, ARTERIAL mm Hg 46.0*   HCO3 ART mmol/L 24.3        Results Review:    I have reviewed the relevant laboratory results and independently reviewed the chest imaging from this hospitalization including the available echocardiogram reports personally and summarized it if/ when appropriate below    Assessment    Acute hypoxic respiratory failure s/p vent - 1/12 after prolonged NIPPV  Acute COVID-19 pneumonia - Unvaccinated patient  ARDS s/p paralytic  Acute pulmonary embolism on full AC  Secondary bacterial pneumonia  Septic shock  Cytokine release syndrome s/p remdesivir  Acute renal failure s/p CRRT  Acute hyperkalemia  Previous biceps surgery  JOSHUA - Noncompliant with PAP  Morbid obesity    PLAN:  Patient remains on significant support on the mechanical ventilator and in ARDS.  Able to make some progress with regards to oxygen support.  Still remains paralyzed and heavily sedated.  Will work on holding paralytics if you are able to continue with CRRT uneventfully.  Having issues with CRRT and some concern about the line and hence we will work on getting a new dialysis line today.  Blood work reviewed and recommendations from nephrology reviewed.  Plan is to restart CRRT as soon as possible  C/w broad-spectrum antibiotics pending cultures for secondary pneumonia  C/w steroids weaning  C/w anticoagulation for DVT/PE.    Continue with tube feed    Full code    DVT/ GI prophylaxis    Very guarded prognosis. D/w RN and MDR.      Spoke to patient's brother Dr. Rodriguez (ER physician in [place]) and wife previously in detail - very poor prognosis discussed. DNR and full Rx rec'd.     CC - 36 mins not including procedure time    Pernell Alcantar MD  1/14/2022

## 2022-01-14 NOTE — PROCEDURES
"Non-Tunneled Catheter    Date/Time: 1/14/2022 2:23 PM  Performed by: Pernell Alcantar MD  Authorized by: Pernell Alcantar MD   Consent: Written consent obtained.  Risks and benefits: risks, benefits and alternatives were discussed  Consent given by: guardian  Procedure consent: procedure consent matches procedure scheduled  Relevant documents: relevant documents present and verified  Test results: test results available and properly labeled  Site marked: the operative site was marked  Imaging studies: imaging studies available  Required items: required blood products, implants, devices, and special equipment available  Patient identity confirmed: arm band and hospital-assigned identification number  Time out: Immediately prior to procedure a \"time out\" was called to verify the correct patient, procedure, equipment, support staff and site/side marked as required.  Indications: vascular access  Anesthesia: see MAR for details    Sedation:  Patient sedated: no    Preparation: skin prepped with ChloraPrep  Skin prep agent dried: skin prep agent completely dried prior to procedure  Sterile barriers: all five maximum sterile barriers used - cap, mask, sterile gown, sterile gloves, and large sterile sheet  Hand hygiene: hand hygiene performed prior to central venous catheter insertion  Location details: right internal jugular  Patient position: Trendelenburg  Procedural supplies: HD line.  Pre-procedure: landmarks identified  Ultrasound guidance: yes  Number of attempts: 1  Successful placement: yes  Post-procedure: line sutured and dressing applied  Assessment: blood return through all ports,  free fluid flow,  placement verified by x-ray and no pneumothorax on x-ray  Patient tolerance: patient tolerated the procedure well with no immediate complications        "

## 2022-01-14 NOTE — NURSING NOTE
Patient remains in CCU on CRRT. CRRT access issues this morning with air bubbles. Dialysis line exchange done with Dr. Alcantar and CRRT restarted shortly after. Still having occasional issues with air bubbles coming from CRRT access with new line, but able to run CRRT effectively. Patient converted to afib this shift with heart rates in the 180s. IV Cardizem push given; HR remained 180s. Amiodarone protocol started; HR now in 130s. Cardiology consulted also, no new orders. Patients temp dropped to 89 F this morning; cocoon warming system placed. Temps increasing now at 94 F.    Family updated throughout the day on patient condition. Family able to facetime patient this evening as well.

## 2022-01-14 NOTE — CASE MANAGEMENT/SOCIAL WORK
Continued Stay Note  [facility]     Patient Name: Renaldo Rodriguez  MRN: [id number]  Today's Date: 1/14/2022    Admit Date: 1/2/2022     Discharge Plan     Row Name 01/14/22 1414       Plan    Plan Discharge plans pending clinical course.    Plan Comments Pt on vent  CRRT  Following for discharge needs as the patient's clinical course evolves               Discharge Codes    No documentation.               Expected Discharge Date and Time     Expected Discharge Date Expected Discharge Time    Jan 19, 2022             Litzy Falk RN

## 2022-01-14 NOTE — NURSING NOTE
Spoke with patients surgeon, Ward Ledesma today who performed patients bicep repair. He has request if possible the following orders:    - hinged double elbow brace  - 60 degrees full flexion  - okay for active & passive motions in brace  - no extension beyond 60 degrees    Ward Ledesma cell phone number; [phone] if there are any further questions.

## 2022-01-14 NOTE — PROGRESS NOTES
"[facility] Clinical Pharmacy Services: Vancomycin Monitoring Note    Renaldo Rodriguez is a 57 y.o. male who is on day 2 of pharmacy to dose vancomycin for PNA/sepsis.    Current Vancomycin Dose:   intermittent dose  Updated Cultures and Sensitivities:   1/13 bld cx x2 pending  1/13 MRSA swab negative    Results from last 7 days   Lab Units 01/14/22  0559   VANCOMYCIN RM mcg/mL 8.50        Vitals/Labs  Ht: 175 cm (68.9\"); Wt: 103 kg (226 lb 3.1 oz)   Temp Readings from Last 1 Encounters:   01/14/22 94.82 °F (34.9 °C)     Estimated Creatinine Clearance: 45.1 mL/min (A) (by C-G formula based on SCr of 2.11 mg/dL (H)).     CRRT started on 1/13/22    Results from last 7 days   Lab Units 01/14/22  0559 01/14/22  0057 01/13/22  1859 01/13/22  1556 01/13/22  0426 01/12/22  0539 01/12/22  0539   CREATININE mg/dL 2.15*  2.11* 2.44* 3.05*   < > 3.05*   < > 0.98   WBC 10*3/mm3 30.83*  --   --   --  33.63*  --  18.13*    < > = values in this interval not displayed.     Assessment/Plan  Continues on CRRT today.Random level this AM is low.   Vancomycin Dose: Give vancomycin 1000mg IV x1 today.  Next Level Date and Time: Check 12 hour random level tonight at ~2100.   We will continue to monitor patient changes and renal function     Thank you for involving pharmacy in this patient's care. Please contact pharmacy with any questions or concerns.       Shantal Hammer, PharmD  Clinical Pharmacist          "

## 2022-01-14 NOTE — PROGRESS NOTES
[facility] Progress Note      Patient Name: Renaldo Rodriguez  : 1965  MRN: [id number]  Primary Care Physician:  Provider, No Known  Date of admission: 2022    Subjective     Interval History:   Follow-up acute kidney injury    The patient is currently on CRRT, on vasopressors, on the ventilator and sedated, tolerating the CRRT    Review of Systems:   Not obtainable    Objective     Vitals:   Temp:  [94.82 °F (34.9 °C)-101.48 °F (38.6 °C)] 94.82 °F (34.9 °C)  Heart Rate:  [43-79] 49  Resp:  [25-26] 26  BP: ()/(56-84) 114/75  FiO2 (%):  [60 %-65 %] 60 %    Intake/Output Summary (Last 24 hours) at 2022 0933  Last data filed at 2022 0900  Gross per 24 hour   Intake 2272 ml   Output 2243.1 ml   Net 28.9 ml       Physical Exam:    Constitutional: The ventilator, sedated and appears to be chronically ill  HEENT: Sclera anicteric, no conjunctival injection, orally intubated  Neck:  No thyromegaly, no lymphadenopathy, trachea at midline, no JVD  Respiratory: Bilateral rhonchi and crackles, nonlabored respiration  Cardiovascular: RRR, no murmurs, no rubs or gallops, no carotid bruit  Gastrointestinal: Positive bowel sounds, abdomen is soft, no guarding and nondistended  : No palpable bladder, Walker catheter anchored in place  Musculoskeletal: Trace ankle edema, no clubbing or cyanosis  Neurologic: Unable to assess  Skin: Warm and dry        Scheduled Meds:     artificial tears, , Both Eyes, Q4H  atropine, 0.4 mg, Intravenous, Once  atropine sulfate, , ,   atropine, 1 mg, Intravenous, Once  cefepime, 2 g, Intravenous, Q8H  cholecalciferol, 1,000 Units, Oral, Daily  dexamethasone, 6 mg, Intravenous, Q12H  famotidine, 20 mg, Intravenous, Daily  ipratropium-albuterol, 3 mL, Nebulization, 4x Daily - RT  polyethylene glycol, 17 g, Oral, Daily  pravastatin, 10 mg, Oral, Nightly  senna-docusate sodium, 2 tablet, Oral, BID  sodium chloride, 10 mL, Intravenous, Q12H  sodium  polystyrene, 25 g, Oral, Once  vancomycin, 1,000 mg, Intravenous, Once  Vancomycin Pharmacy Intermittent Dosing, , Does not apply, Daily      IV Meds:   cisatracurium (NIMBEX) infusion, 3-10 mcg/kg/min, Last Rate: 3 mcg/kg/min (01/14/22 0330)  dexmedetomidine, 0.2-1.5 mcg/kg/hr, Last Rate: 0.7 mcg/kg/hr (01/14/22 0741)  fentaNYL citrate-NaCl,  mcg/hr, Last Rate: 50 mcg/hr (01/13/22 1556)  heparin, 14.6 Units/kg/hr, Last Rate: Stopped (01/14/22 0755)  norepinephrine, 0.02-0.3 mcg/kg/min, Last Rate: 0.08 mcg/kg/min (01/14/22 0330)  Pharmacy to dose vancomycin,   Phoxillum BK4/2.5, 1,500 mL/hr, Last Rate: 1,500 mL/hr (01/14/22 0350)  Phoxillum BK4/2.5, 1,500 mL/hr, Last Rate: 1,500 mL/hr (01/14/22 0410)  Phoxillum BK4/2.5, 1,500 mL/hr, Last Rate: 1,500 mL/hr (01/14/22 0400)  propofol, 5-50 mcg/kg/min, Last Rate: 30 mcg/kg/min (01/14/22 0740)        Results Reviewed:   I have personally reviewed the results from the time of this admission to 1/14/2022 09:33 EST     Results from last 7 days   Lab Units 01/14/22  0559 01/14/22  0057 01/13/22  1859 01/13/22  1556 01/13/22  0426 01/12/22  0539 01/12/22  0539   SODIUM mmol/L 134*  135* 136 134*   < > 137   < > 135*   POTASSIUM mmol/L 4.8  4.8 4.9 4.9   < > 6.7*   < > 5.0   CHLORIDE mmol/L 102  103 102 102   < > 100   < > 101   CO2 mmol/L 19.8*  19.5* 19.3* 18.9*   < > 21.3*   < > 21.0*   BUN mg/dL 38*  39* 36* 46*   < > 61*   < > 34*   CREATININE mg/dL 2.15*  2.11* 2.44* 3.05*   < > 3.05*   < > 0.98   CALCIUM mg/dL 7.7*  7.5* 7.4* 7.1*   < > 7.5*   < > 8.4*   BILIRUBIN mg/dL 1.0  --   --   --  2.4*  --  1.1   ALK PHOS U/L 70  --   --   --  90  --  91   ALT (SGPT) U/L 36  --   --   --  53*  --  58*   AST (SGOT) U/L 34  --   --   --  38  --  44*   GLUCOSE mg/dL 105*  104* 96 103*   < > 143*   < > 140*    < > = values in this interval not displayed.       Estimated Creatinine Clearance: 45.1 mL/min (A) (by C-G formula based on SCr of 2.11 mg/dL (H)).    Results  from last 7 days   Lab Units 01/14/22  0559 01/14/22  0057 01/13/22  1859 01/13/22  1556 01/13/22  1556   MAGNESIUM mg/dL 2.7* 2.6  --   --  2.5   PHOSPHORUS mg/dL 6.3* 6.7* 6.2*   < > 5.9*    < > = values in this interval not displayed.             Results from last 7 days   Lab Units 01/14/22  0559 01/13/22  0426 01/12/22  0539 01/11/22  0611 01/10/22  0647   WBC 10*3/mm3 30.83* 33.63* 18.13* 16.66* 14.21*   HEMOGLOBIN g/dL 11.7* 13.3 13.7 13.6 13.1   PLATELETS 10*3/mm3 324 415 348 419 392       Results from last 7 days   Lab Units 01/13/22  0426   INR  1.44*       Assessment / Plan     ASSESSMENT:  1. acute kidney injury secondary to septic shock, currently on CRRT, potassium 4.8, creatinine 2.15   2. COVID-19 pneumonia  3. Septic shock  4. Acute respiratory failure with hypoxia    PLAN:  1. continue the same treatment and increase the ultrafiltration goal to 100 cc/h  2.  Surveillance labs    I discussed the case with the patient's nurse    Thank you for involving us in the care of Renaldo Rodriguez.  Please feel free to call with any questions.    Kg Felton MD  01/14/22  09:33 [facility]    [facility]  [phone]      Much of this encounter note is an electronic transcription/translation of spoken language to printed text. The electronic translation of spoken language may permit erroneous, or at times, nonsensical words or phrases to be inadvertently transcribed; Although I have reviewed the note for such errors, some may still exist.

## 2022-01-14 NOTE — NURSING NOTE
Patient remains in CCU on CRRT. Large amount of negative pressure alarms on CRRT machine; unable to keep blood flow rate at 250 as ordered. Spoke with Dr. Ho; keep BFR above 150 (see order).  Remains on propofol, nimbex, fentanyl, precedex & low dose of levophed.   Left IJ central line site still oozing from suture site; MD aware. Thrombi pads placed with dressing & dressing changed twice this shift.   Spoke with patients surgeon today who performed bicep repair a few weeks ago; he gave orders for care of bicep injury. See note.    Discussed patient condition with wife, Ban. She would like to keep patient a full code right now & is going to think about making patient a DNR.

## 2022-01-14 NOTE — PROGRESS NOTES
Adult Nutrition  Assessment/PES    Patient Name:  Renaldo Rodriguez  YOB: 1965  MRN: [id number]  Admit Date:  1/2/2022    Assessment Date:  1/14/2022    Comments:  Nutrition follow up. Pt remains on vent. TF's not started yet. He is currently on CRRT. Propofol 15ml/hr. Labs Na 135, glu, bun, cr, phos 6.3, mg 2.7, alb, crp, tbili, wbc, H/H. Last BM 1/7 on laxative.   Recommend Tf's of [facility] Renal goal at 40ml/hr. Water flushes per Renal. Will discuss care in rounds.      Reason for Assessment     Row Name 01/14/22 0911          Reason for Assessment    Reason For Assessment follow-up protocol; TF/PN                Nutrition/Diet History     Row Name 01/14/22 0918          Nutrition/Diet History    Typical Food/Fluid Intake TF's held at this time                  Labs/Tests/Procedures/Meds     Row Name 01/14/22 0948          Labs/Procedures/Meds    Lab Results Comments Na 135, glu, bun, cr, phos 6.3, mg 2.7, alb, crp, tbili, wbc, H/H            Diagnostic Tests/Procedures    Diagnostic Test/Procedures Comments CRRT            Medications    Pertinent Medications Comments cefepime, vit D, decadron, pepcid, miralax, vanc, nimbex, precedex, heparin, Levo                Physical Findings     Row Name 01/14/22 0949          Physical Findings    Overall Physical Appearance on ventilator support     Gastrointestinal feeding tube     Tubes nasogastric tube                  Nutrition Prescription Ordered     Row Name 01/14/22 0950          Nutrition Prescription PO    Current PO Diet NPO            Propofol Considerations    Propofol (mL/hr) 15 mL/hr     Propofol (Kcal/day) 396 Kcal/day                Evaluation of Received Nutrient/Fluid Intake     Row Name 01/14/22 0951          Calories Evaluation    Other Calories (kcal) 396                     Problem/Interventions:           Intervention Goal     Row Name 01/14/22 0974          Intervention Goal    General Maintain nutrition; Nutrition support  treatment; Improved nutrition related lab(s); Reduce/improve symptoms; Meet nutritional needs for age/condition; Disease management/therapy     TF/PN Inititiate TF/PN; Adjust TF/PN     Weight No significant weight loss                Nutrition Intervention     Row Name 01/14/22 0951          Nutrition Intervention    RD/Tech Action Follow Tx progress; Care plan reviewd; Recommend/ordered     Recommended/Ordered EN                Nutrition Prescription     Row Name 01/14/22 0951          Nutrition Prescription EN    Enteral Prescription Enteral begin/change     Product Novasource Renal     Continuous TF Goal Rate (mL/hr) 40 mL/hr                Education/Evaluation     Row Name 01/14/22 0953          Monitor/Evaluation    Monitor Per protocol; I&O; Pertinent labs; TF delivery/tolerance; Weight; Skin status                 Electronically signed by:  Rama Klein RD  01/14/22 09:53 EST

## 2022-01-15 NOTE — PROGRESS NOTES
[facility]  [phone]  Pietro Solares MD    Subjective:  LOS: 13    Chief Complaint: Respiratory failure    Not tolerating CRRT.  Currently intubated sedated and paralyzed.    Objective   Vital Signs past 24hrs    Temp range: Temp (24hrs), Av.7 °F (33.7 °C), Min:89 °F (31.7 °C), Max:94.6 °F (34.8 °C)    BP range: BP: ()/(20-97) 129/37  Pulse range: Heart Rate:  [] 61  Resp rate range: Resp:  [24-26] 24    Device (Oxygen Therapy): ventilatorFlow (L/min):  [60] 60  Oxygen range:SpO2:  [90 %-99 %] 98 %   FiO2 (%):  [60 %] 60 %  S RR:  [24] 24  PEEP/CPAP (cm H2O):  [10 cm H20] 10 cm H20  [place] SUP:  [0 cm H20] 0 cm H20  MAP (cm H2O):  [16] 16  102 kg (225 lb 5 oz); Body mass index is 33.37 kg/m².    Intake/Output Summary (Last 24 hours) at 1/15/2022 1127  Last data filed at 1/15/2022 1000  Gross per 24 hour   Intake 3439.1 ml   Output 2688 ml   Net 751.1 ml       Physical Exam  Constitutional:       Appearance: He is ill-appearing.      Interventions: He is sedated, chemically paralyzed, intubated and restrained.   Cardiovascular:      Rate and Rhythm: Normal rate and regular rhythm.      Heart sounds: No murmur heard.      Pulmonary:      Effort: He is intubated.      Breath sounds: Rales (scattered) present.   Abdominal:      General: Bowel sounds are normal.      Palpations: Abdomen is soft. There is no mass.      Tenderness: There is no abdominal tenderness.   Musculoskeletal:         General: No swelling.      Comments: Right arm surgical bandage       Results Review:    I have reviewed the laboratory and imaging data since the last note by LPC physician.  My annotations are noted in assessment and plan.    Results from last 7 days   Lab Units 01/15/22  0357 22  0559 22  0426 22  0539 22  0611   FERRITIN ng/mL 4,866.00* 3,918.00* 4,419.00*   < > 3,805.00*   D DIMER QUANT MCGFEU/mL 1.59*  --  5.28*  --  3.60*   CRP mg/dL 9.14* 15.40* 21.48*   < > 4.02*    < > =  values in this interval not displayed.       Medication Review:  I have reviewed the current MAR.  My annotations are noted in assessment and plan.    amiodarone, 0.5 mg/min, Last Rate: 0.5 mg/min (01/15/22 0742)  cisatracurium (NIMBEX) infusion, 3-10 mcg/kg/min, Last Rate: 3 mcg/kg/min (01/15/22 0300)  dexmedetomidine, 0.2-1.5 mcg/kg/hr, Last Rate: Stopped (01/14/22 0830)  fentaNYL citrate-NaCl,  mcg/hr, Last Rate: 100 mcg/hr (01/15/22 0912)  heparin, 14.6 Units/kg/hr, Last Rate: 11.6 Units/kg/hr (01/14/22 1814)  norepinephrine, 0.02-0.3 mcg/kg/min, Last Rate: 0.3 mcg/kg/min (01/15/22 0649)  phenylephrine, 0.5-3 mcg/kg/min, Last Rate: 3 mcg/kg/min (01/15/22 1055)  Phoxillum BK4/2.5, 1,500 mL/hr, Last Rate: 1,500 mL/hr (01/15/22 0822)  Phoxillum BK4/2.5, 1,500 mL/hr, Last Rate: 1,500 mL/hr (01/15/22 0822)  Phoxillum BK4/2.5, 1,500 mL/hr, Last Rate: 1,500 mL/hr (01/15/22 0822)  propofol, 5-50 mcg/kg/min, Last Rate: 35 mcg/kg/min (01/15/22 0845)  vasopressin, 0.03 Units/min, Last Rate: 0.03 Units/min (01/15/22 0917)      Plan   PCCM Problems  Acute hypoxic respiratory failure s/p vent - 1/12 after prolonged NIPPV  Acute COVID-19 pneumonia  ARDS s/p paralytic  Acute pulmonary embolism on full AC  Secondary bacterial pneumonia  Septic shock  Cytokine release syndrome s/p remdesivir  Acute renal failure s/p CRRT  Acute hyperkalemia  Previous biceps surgery  JOSHUA - Noncompliant with PAP  Obesity  New onset A. fib    THESE ARE NEW MEDICAL PROBLEMS TO ME.    Plan of Treatment    Continue mechanical ventilation.  Ventilator settings adjusted to an FiO2 of 50% with a PEEP of 10 and IP of 20. Check ABG.  Will trial off paralysis.    Remains on dexamethasone twice daily for COVID-19 infection.    Secondary bacterial pneumonia and on cefepime.    Septic shock and on maximum pressors.  We will check a lactic acid.    Acute renal failure and requiring CRRT.  However unable to tolerate due to dropping blood pressure.   Discussed with renal today.    Recent biceps tendon repair and right arm remains in surgical bandage.    Acute PE and new onset A. fib, therefore remains on full anticoagulation with heparin drip.    Poor prognosis with high risk of further deterioration and death.    I spent 35 mins critical care time in care of this patient outside of any procedures.    Pietro Solares MD  01/15/22  11:27 EST    While in the room and during my examination of the patient I wore gloves, gown, mask, eye protection and followed enhanced droplet/contact isolation protocol and precautions.  I washed my hands before and after this patient encounter.    Part of this note may be an electronic transcription/translation of spoken language to printed text using the Dragon Dictation System.

## 2022-01-15 NOTE — PLAN OF CARE
Goal Outcome Evaluation:      Pt remains in ccu intubated sedated paralyzed and on CRRT. Pt tolerated most of night, pressures began to decrease around 0300 and had to decrease what is being pulled off and adding pressors. HR has remained a.fib in 110s-130s.  Pt temp has sustained low through night with warming blanket. Added vaso and will add johnie if needed. Will continue to monitor.

## 2022-01-15 NOTE — PROGRESS NOTES
Wife at bedside.  Spoke to her and son.  Explained poor prognosis and lack of improvement especially with intolerance of CRRT.  Family is considering palliative measures and comfort care. They will let us know if and when they wish to proceed.

## 2022-01-15 NOTE — PROGRESS NOTES
[facility] Progress Note      Patient Name: Renaldo Rodriguez  : 1965  MRN: [id number]  Primary Care Physician:  Provider, No Known  Date of admission: 2022    Subjective     Interval History:   Follow-up acute kidney injury    Currently on CRRT but on max dose IV pressors and remains profoundly hypotensive  CRRT being discontinued    Review of Systems:   Not obtainable    Objective     Vitals:   Temp:  [89 °F (31.7 °C)-94.6 °F (34.8 °C)] 93.4 °F (34.1 °C)  Heart Rate:  [] 61  Resp:  [24-26] 24  BP: ()/(20-97) 129/37  Flow (L/min):  [60] 60  FiO2 (%):  [60 %] 60 %    Intake/Output Summary (Last 24 hours) at 1/15/2022 1116  Last data filed at 1/15/2022 1000  Gross per 24 hour   Intake 3439.1 ml   Output 2688 ml   Net 751.1 ml       Physical Exam:    Constitutional:  Sedated mechanically ventilated  Neck:  trachea at midline, no JVD  Respiratory:  Bilateral rhonchi  Cardiovascular: RRR, tachycardic  Gastrointestinal: abdomen is soft, nondistended  : Walker catheter anchored  Musculoskeletal: Trace ankle edema bilaterally  Neurologic:  Sedated and intubated      Scheduled Meds:     artificial tears, , Both Eyes, Q4H  atropine, 0.4 mg, Intravenous, Once  atropine, 1 mg, Intravenous, Once  cefepime, 2 g, Intravenous, Q8H  cholecalciferol, 1,000 Units, Oral, Daily  dexamethasone, 6 mg, Intravenous, Q12H  famotidine, 20 mg, Intravenous, Daily  ipratropium-albuterol, 3 mL, Nebulization, 4x Daily - RT  polyethylene glycol, 17 g, Oral, Daily  pravastatin, 10 mg, Oral, Nightly  senna-docusate sodium, 2 tablet, Oral, BID  sodium chloride, 10 mL, Intravenous, Q12H  sodium polystyrene, 25 g, Oral, Once      IV Meds:   amiodarone, 0.5 mg/min, Last Rate: 0.5 mg/min (01/15/22 0742)  cisatracurium (NIMBEX) infusion, 3-10 mcg/kg/min, Last Rate: 3 mcg/kg/min (01/15/22 0300)  dexmedetomidine, 0.2-1.5 mcg/kg/hr, Last Rate: Stopped (22 0830)  fentaNYL citrate-NaCl,  mcg/hr,  Last Rate: 100 mcg/hr (01/15/22 0912)  heparin, 14.6 Units/kg/hr, Last Rate: 11.6 Units/kg/hr (01/14/22 1814)  norepinephrine, 0.02-0.3 mcg/kg/min, Last Rate: 0.3 mcg/kg/min (01/15/22 0649)  phenylephrine, 0.5-3 mcg/kg/min, Last Rate: 3 mcg/kg/min (01/15/22 1055)  Phoxillum BK4/2.5, 1,500 mL/hr, Last Rate: 1,500 mL/hr (01/15/22 0822)  Phoxillum BK4/2.5, 1,500 mL/hr, Last Rate: 1,500 mL/hr (01/15/22 0822)  Phoxillum BK4/2.5, 1,500 mL/hr, Last Rate: 1,500 mL/hr (01/15/22 0822)  propofol, 5-50 mcg/kg/min, Last Rate: 35 mcg/kg/min (01/15/22 0845)  vasopressin, 0.03 Units/min, Last Rate: 0.03 Units/min (01/15/22 0917)        Results Reviewed:   I have personally reviewed the results from the time of this admission to 1/15/2022 11:16 EST     Results from last 7 days   Lab Units 01/15/22  0837 01/15/22  0357 01/15/22  0014 01/14/22  1231 01/14/22  0559 01/13/22  1556 01/13/22  0426   SODIUM mmol/L 133* 133*  133* 134*   < > 134*  135*   < > 137   POTASSIUM mmol/L 5.2 5.3*  5.3* 5.5*   < > 4.8  4.8   < > 6.7*   CHLORIDE mmol/L 101 100  100 100   < > 102  103   < > 100   CO2 mmol/L 19.3* 19.9*  19.9* 22.0   < > 19.8*  19.5*   < > 21.3*   BUN mg/dL 28* 29*  29* 30*   < > 38*  39*   < > 61*   CREATININE mg/dL 1.46* 1.62*  1.62* 1.63*   < > 2.15*  2.11*   < > 3.05*   CALCIUM mg/dL 7.4* 7.6*  7.6* 7.5*   < > 7.7*  7.5*   < > 7.5*   BILIRUBIN mg/dL  --  0.8  --   --  1.0  --  2.4*   ALK PHOS U/L  --  88  --   --  70  --  90   ALT (SGPT) U/L  --  39  --   --  36  --  53*   AST (SGOT) U/L  --  29  --   --  34  --  38   GLUCOSE mg/dL 239* 220*  220* 175*   < > 105*  104*   < > 143*    < > = values in this interval not displayed.       Estimated Creatinine Clearance: 64.8 mL/min (A) (by C-G formula based on SCr of 1.46 mg/dL (H)).    Results from last 7 days   Lab Units 01/15/22  0837 01/15/22  0357 01/15/22  0014 01/14/22  1837 01/14/22  1837   MAGNESIUM mg/dL 2.7*  --  2.6  --  2.6   PHOSPHORUS mg/dL 7.0* 7.1* 6.9*    < > 7.5*    < > = values in this interval not displayed.             Results from last 7 days   Lab Units 01/15/22  0357 01/14/22  0559 01/13/22  0426 01/12/22  0539 01/11/22  0611   WBC 10*3/mm3 38.82* 30.83* 33.63* 18.13* 16.66*   HEMOGLOBIN g/dL 12.1* 11.7* 13.3 13.7 13.6   PLATELETS 10*3/mm3 320 324 415 348 419       Results from last 7 days   Lab Units 01/13/22  0426   INR  1.44*       Assessment / Plan     ASSESSMENT:  1. acute kidney injury secondary to septic shock, currently on CRRT   2. COVID-19 pneumonia  3. Septic shock  4. Acute respiratory failure with hypoxia    PLAN:  1. Given profound hypotension and difficulty tolerating CRRT and currently being on multiple IV pressors that are maxed out, overall prognosis is extremely poor and ultimate demise appears to be imminent at this time. Agree with discontinuing CRRT. Family will be discussing further goals of care. Patient is DNR. Discussed with Dr. Solares and Charlotte Mayo MD  01/15/22  11:16 [facility]    [facility]  [phone]      Much of this encounter note is an electronic transcription/translation of spoken language to printed text. The electronic translation of spoken language may permit erroneous, or at times, nonsensical words or phrases to be inadvertently transcribed; Although I have reviewed the note for such errors, some may still exist.

## 2022-01-15 NOTE — DISCHARGE SUMMARY
"Date of Admission: 1/2/2022  Date of Discharge:  1/15/2022    Discharge Diagnosis:    Acute hypoxic respiratory failure s/p vent - 1/12 after prolonged NIPPV  Acute COVID-19 pneumonia  ARDS s/p paralytic  Acute pulmonary embolism on full AC  Secondary bacterial pneumonia  Septic shock  Cytokine release syndrome s/p remdesivir  Acute renal failure s/p CRRT  Acute hyperkalemia  Previous biceps surgery  JOSHUA - Noncompliant with PAP  Obesity  New onset A. fib    Presenting Problem/History of Present Illness    Mr. Rodriguez is  57yo unvaccinated WM.  He started feeling unwell about [place] day.  He had \"flu like symptoms\" up until this past weekend he starting having shortness of breath, progressively increasing, became severe worse on exertion, some better at rest and he reports better now with oxygen.  He has been admitted here and is requiring a non rebreather at this point but sats are % on this and he is able to talk in complete sentences, he is not using accessory muscles and says he does not feel distressed.  He does not have chest pain or leg pain.  He does have some cough, mainly non productive.  He does report a history of JOSHUA but does not use pap therapy.  He has no history of asthma, he is a lifelong nonsmoker.  He did not get any antibody infusion prior to admission.  CXR shows bilateral infiltrates and his CRP is very high.     Hospital Course    Patient was admitted with COVID-19 infection.  He did not receive outpatient antibodies.  He was given ceftriaxone after ID consultation.  Transferred to ICU on 1/7/2022 for worsening oxygenation.  New diagnosis of acute pulmonary embolism and therefore started on full anticoagulation.  Continue with aggressive supportive treatment.  Oxygenation worsened requiring noninvasive ventilation.  Unfortunately he deteriorated further and requiring off mechanical ventilation.  Nephrology was consulted and CRRT had to be started for acute kidney injury and septic shock.  " He was paralyzed for appropriate oxygenation.  Unfortunately not tolerating even CRRT with blood pressure falling.  This had to be therefore stopped.  Spoke to wife and son at bedside.  They have elected to stop further treatment.  Patient treatment was discontinued and comfort measures were instituted.  He  as below.      Condition on Discharge:   : Date and Time of Death: 1/15/2022 at 12:23 PM       Pietro Solares MD  01/15/22  15:15 EST    Time: I spent UNDER 30mins in the discharge planning of this patient.    Some of this encounter note is an electronic transcription/translation of spoken language to printed text.

## 2022-01-17 LAB
QT INTERVAL: 308 MS
QT INTERVAL: 342 MS
QT INTERVAL: 513 MS

## 2022-01-17 NOTE — PAYOR COMM NOTE
"Cecil Conde (Dcsd. Male)                   ATTENTION;     CASE REF        [id number]   PATIENT  ON 1/15/22                Date of Birth Social Security Number Address Home Phone MRN    1965  [id number] [facility] [address] [phone] [id number]    Jew Marital Status             Lutheran        Admission Date Admission Type Admitting Provider Attending Provider Department, Room/Bed    22 Emergency oJhn Maher MD  [facility] CORONARY CARE, N331/    Discharge Date Discharge Disposition Discharge Destination          1/15/2022               Attending Provider: (none)   Allergies: No Known Allergies    Isolation: None   Infection: COVID (confirmed) (22)   Code Status: Prior   Advance Care Planning Activity    Ht: 175 cm (68.9\")   Wt: 102 kg (225 lb 5 oz)    Admission Cmt: None   Principal Problem: Pneumonia due to COVID-19 virus [U07.1,J12.82]                 Active Insurance as of 2022     Primary Coverage     Payor Plan Insurance Group Employer/Plan Group    [employer] COMMERCIAL [employer] [id number]     Payor Plan Address Payor Plan Phone Number Payor Plan Fax Number Effective Dates    [address] [phone]  2021 - None Entered    [facility] [address]       Subscriber Name Subscriber Birth Date Member ID       CECIL CONDE 1965 [id number]                 Emergency Contacts      (Rel.) Home Phone Work Phone Mobile Phone    Ban Conde (Spouse) [phone] -- --    Samson Conde (Brother) -- -- [phone]               Discharge Summary      Pietro Solares MD at 01/15/22 1515        Date of Admission: 2022  Date of Discharge:  1/15/2022    Discharge Diagnosis:    Acute hypoxic respiratory failure s/p vent -  after prolonged NIPPV  Acute COVID-19 pneumonia  ARDS s/p paralytic  Acute pulmonary embolism on full AC  Secondary bacterial pneumonia  Septic shock  Cytokine release " "syndrome s/p remdesivir  Acute renal failure s/p CRRT  Acute hyperkalemia  Previous biceps surgery  JOSHUA - Noncompliant with PAP  Obesity  New onset A. fib    Presenting Problem/History of Present Illness    Mr. Rodriguez is  55yo unvaccinated WM.  He started feeling unwell about Christmas day.  He had \"flu like symptoms\" up until this past weekend he starting having shortness of breath, progressively increasing, became severe worse on exertion, some better at rest and he reports better now with oxygen.  He has been admitted here and is requiring a non rebreather at this point but sats are % on this and he is able to talk in complete sentences, he is not using accessory muscles and says he does not feel distressed.  He does not have chest pain or leg pain.  He does have some cough, mainly non productive.  He does report a history of JOSHUA but does not use pap therapy.  He has no history of asthma, he is a lifelong nonsmoker.  He did not get any antibody infusion prior to admission.  CXR shows bilateral infiltrates and his CRP is very high.     Hospital Course    Patient was admitted with COVID-19 infection.  He did not receive outpatient antibodies.  He was given ceftriaxone after ID consultation.  Transferred to ICU on 1/7/2022 for worsening oxygenation.  New diagnosis of acute pulmonary embolism and therefore started on full anticoagulation.  Continue with aggressive supportive treatment.  Oxygenation worsened requiring noninvasive ventilation.  Unfortunately he deteriorated further and requiring off mechanical ventilation.  Nephrology was consulted and CRRT had to be started for acute kidney injury and septic shock.  He was paralyzed for appropriate oxygenation.  Unfortunately not tolerating even CRRT with blood pressure falling.  This had to be therefore stopped.  Spoke to wife and son at bedside.  They have elected to stop further treatment.  Patient treatment was discontinued and comfort measures were " instituted.  He  as below.      Condition on Discharge:   : Date and Time of Death: 1/15/2022 at 12:23 PM       Pietro Solares MD  01/15/22  15:15 EST    Time: I spent UNDER 30mins in the discharge planning of this patient.    Some of this encounter note is an electronic transcription/translation of spoken language to printed text.          Electronically signed by Pietro Solares MD at 22 [id number]

## 2022-01-18 LAB
BACTERIA SPEC AEROBE CULT: NORMAL
BACTERIA SPEC AEROBE CULT: NORMAL

## (undated) DEVICE — SYR CONTRL LUERLOK 10CC

## (undated) DEVICE — PK NEURO SPINE 40

## (undated) DEVICE — GLV SURG BIOGEL LTX PF 7

## (undated) DEVICE — 3M™ MEDIPORE™ H SOFT CLOTH SURGICAL TAPE 2862, 2 INCH X 10 YARD (5CM X 9,1M), 12 ROLLS/CASE: Brand: 3M™ MEDIPORE™

## (undated) DEVICE — Device

## (undated) DEVICE — DRP MICROSCOPE 4 BINOCULAR CV 54X150IN

## (undated) DEVICE — SMOKE EVACUATION TUBING WITH 7/8 IN TO 1/4 IN REDUCER: Brand: BUFFALO FILTER

## (undated) DEVICE — SPNG GZ WOVN 4X4IN 12PLY 10/BX STRL

## (undated) DEVICE — 6.0MM PRECISION ROUND

## (undated) DEVICE — GLV SURG SENSICARE W/ALOE PF LF 7.5 STRL

## (undated) DEVICE — CODMAN® SURGICAL PATTIES 3/4" X 3/4" (1.91CM X 1.91CM): Brand: CODMAN®

## (undated) DEVICE — AIRLIFE™ UNIVERSAL OXYGEN NUT AND NIPPLE CONNECTION: Brand: AIRLIFE™

## (undated) DEVICE — CONN TBG Y 5 IN 1 LF STRL

## (undated) DEVICE — SOL NACL 0.9PCT 100ML SGL

## (undated) DEVICE — APPL CHLORAPREP W/TINT 26ML ORNG

## (undated) DEVICE — NDL SPINE 20G 3 1/2 YEL STRL 1P/U

## (undated) DEVICE — ANTIBACTERIAL UNDYED BRAIDED (POLYGLACTIN 910), SYNTHETIC ABSORBABLE SUTURE: Brand: COATED VICRYL

## (undated) DEVICE — ADHS SKIN DERMABOND TOP ADVANCED

## (undated) DEVICE — UNDYED BRAIDED (POLYGLACTIN 910), SYNTHETIC ABSORBABLE SUTURE: Brand: COATED VICRYL